# Patient Record
Sex: FEMALE | Race: BLACK OR AFRICAN AMERICAN | NOT HISPANIC OR LATINO
[De-identification: names, ages, dates, MRNs, and addresses within clinical notes are randomized per-mention and may not be internally consistent; named-entity substitution may affect disease eponyms.]

---

## 2019-11-21 ENCOUNTER — APPOINTMENT (OUTPATIENT)
Dept: NEUROLOGY | Facility: CLINIC | Age: 64
End: 2019-11-21

## 2020-01-07 ENCOUNTER — APPOINTMENT (OUTPATIENT)
Dept: NEUROLOGY | Facility: CLINIC | Age: 65
End: 2020-01-07
Payer: COMMERCIAL

## 2020-01-07 VITALS
HEIGHT: 64 IN | BODY MASS INDEX: 35.85 KG/M2 | SYSTOLIC BLOOD PRESSURE: 118 MMHG | WEIGHT: 210 LBS | DIASTOLIC BLOOD PRESSURE: 70 MMHG

## 2020-01-07 DIAGNOSIS — G31.84 MILD COGNITIVE IMPAIRMENT, SO STATED: ICD-10-CM

## 2020-01-07 DIAGNOSIS — R27.0 ATAXIA, UNSPECIFIED: ICD-10-CM

## 2020-01-07 PROCEDURE — 99204 OFFICE O/P NEW MOD 45 MIN: CPT

## 2020-01-07 RX ORDER — QUETIAPINE FUMARATE 400 MG/1
400 TABLET ORAL
Qty: 30 | Refills: 0 | Status: ACTIVE | COMMUNITY
Start: 2019-05-06

## 2020-01-07 RX ORDER — LOSARTAN POTASSIUM 100 MG/1
100 TABLET, FILM COATED ORAL
Qty: 30 | Refills: 0 | Status: ACTIVE | COMMUNITY
Start: 2019-12-17

## 2020-02-18 ENCOUNTER — APPOINTMENT (OUTPATIENT)
Dept: NEUROLOGY | Facility: CLINIC | Age: 65
End: 2020-02-18

## 2020-03-02 ENCOUNTER — APPOINTMENT (OUTPATIENT)
Dept: NEUROLOGY | Facility: CLINIC | Age: 65
End: 2020-03-02

## 2020-11-04 ENCOUNTER — EMERGENCY (EMERGENCY)
Facility: HOSPITAL | Age: 65
LOS: 1 days | Discharge: HOME | End: 2020-11-04
Attending: EMERGENCY MEDICINE
Payer: COMMERCIAL

## 2020-11-04 VITALS
TEMPERATURE: 98 F | DIASTOLIC BLOOD PRESSURE: 99 MMHG | OXYGEN SATURATION: 97 % | HEART RATE: 73 BPM | RESPIRATION RATE: 20 BRPM | SYSTOLIC BLOOD PRESSURE: 145 MMHG

## 2020-11-04 VITALS
HEART RATE: 79 BPM | TEMPERATURE: 98 F | RESPIRATION RATE: 20 BRPM | WEIGHT: 240.08 LBS | SYSTOLIC BLOOD PRESSURE: 177 MMHG | OXYGEN SATURATION: 98 % | DIASTOLIC BLOOD PRESSURE: 100 MMHG | HEIGHT: 64 IN

## 2020-11-04 DIAGNOSIS — Z98.89 OTHER SPECIFIED POSTPROCEDURAL STATES: Chronic | ICD-10-CM

## 2020-11-04 LAB
ALBUMIN SERPL ELPH-MCNC: 3.5 G/DL — SIGNIFICANT CHANGE UP (ref 3.3–5.2)
ALP SERPL-CCNC: 124 U/L — HIGH (ref 40–120)
ALT FLD-CCNC: 72 U/L — HIGH
ANION GAP SERPL CALC-SCNC: 11 MMOL/L — SIGNIFICANT CHANGE UP (ref 5–17)
AST SERPL-CCNC: 48 U/L — HIGH
BASOPHILS # BLD AUTO: 0.03 K/UL — SIGNIFICANT CHANGE UP (ref 0–0.2)
BASOPHILS NFR BLD AUTO: 0.4 % — SIGNIFICANT CHANGE UP (ref 0–2)
BILIRUB SERPL-MCNC: 0.5 MG/DL — SIGNIFICANT CHANGE UP (ref 0.4–2)
BUN SERPL-MCNC: 10 MG/DL — SIGNIFICANT CHANGE UP (ref 8–20)
CALCIUM SERPL-MCNC: 8.8 MG/DL — SIGNIFICANT CHANGE UP (ref 8.6–10.2)
CHLORIDE SERPL-SCNC: 104 MMOL/L — SIGNIFICANT CHANGE UP (ref 98–107)
CO2 SERPL-SCNC: 24 MMOL/L — SIGNIFICANT CHANGE UP (ref 22–29)
CREAT SERPL-MCNC: 0.71 MG/DL — SIGNIFICANT CHANGE UP (ref 0.5–1.3)
EOSINOPHIL # BLD AUTO: 0.22 K/UL — SIGNIFICANT CHANGE UP (ref 0–0.5)
EOSINOPHIL NFR BLD AUTO: 3 % — SIGNIFICANT CHANGE UP (ref 0–6)
GLUCOSE SERPL-MCNC: 229 MG/DL — HIGH (ref 70–99)
HCT VFR BLD CALC: 36.4 % — SIGNIFICANT CHANGE UP (ref 34.5–45)
HGB BLD-MCNC: 11.6 G/DL — SIGNIFICANT CHANGE UP (ref 11.5–15.5)
IMM GRANULOCYTES NFR BLD AUTO: 0.3 % — SIGNIFICANT CHANGE UP (ref 0–1.5)
LYMPHOCYTES # BLD AUTO: 1.26 K/UL — SIGNIFICANT CHANGE UP (ref 1–3.3)
LYMPHOCYTES # BLD AUTO: 17.5 % — SIGNIFICANT CHANGE UP (ref 13–44)
MCHC RBC-ENTMCNC: 25.5 PG — LOW (ref 27–34)
MCHC RBC-ENTMCNC: 31.9 GM/DL — LOW (ref 32–36)
MCV RBC AUTO: 80 FL — SIGNIFICANT CHANGE UP (ref 80–100)
MONOCYTES # BLD AUTO: 0.73 K/UL — SIGNIFICANT CHANGE UP (ref 0–0.9)
MONOCYTES NFR BLD AUTO: 10.1 % — SIGNIFICANT CHANGE UP (ref 2–14)
NEUTROPHILS # BLD AUTO: 4.96 K/UL — SIGNIFICANT CHANGE UP (ref 1.8–7.4)
NEUTROPHILS NFR BLD AUTO: 68.7 % — SIGNIFICANT CHANGE UP (ref 43–77)
NT-PROBNP SERPL-SCNC: 2137 PG/ML — HIGH (ref 0–300)
PLATELET # BLD AUTO: 250 K/UL — SIGNIFICANT CHANGE UP (ref 150–400)
POTASSIUM SERPL-MCNC: 4.4 MMOL/L — SIGNIFICANT CHANGE UP (ref 3.5–5.3)
POTASSIUM SERPL-SCNC: 4.4 MMOL/L — SIGNIFICANT CHANGE UP (ref 3.5–5.3)
PROT SERPL-MCNC: 6.1 G/DL — LOW (ref 6.6–8.7)
RBC # BLD: 4.55 M/UL — SIGNIFICANT CHANGE UP (ref 3.8–5.2)
RBC # FLD: 14.8 % — HIGH (ref 10.3–14.5)
SARS-COV-2 RNA SPEC QL NAA+PROBE: SIGNIFICANT CHANGE UP
SODIUM SERPL-SCNC: 139 MMOL/L — SIGNIFICANT CHANGE UP (ref 135–145)
WBC # BLD: 7.22 K/UL — SIGNIFICANT CHANGE UP (ref 3.8–10.5)
WBC # FLD AUTO: 7.22 K/UL — SIGNIFICANT CHANGE UP (ref 3.8–10.5)

## 2020-11-04 PROCEDURE — 99285 EMERGENCY DEPT VISIT HI MDM: CPT | Mod: 25

## 2020-11-04 PROCEDURE — 85025 COMPLETE CBC W/AUTO DIFF WBC: CPT

## 2020-11-04 PROCEDURE — 96374 THER/PROPH/DIAG INJ IV PUSH: CPT

## 2020-11-04 PROCEDURE — 99285 EMERGENCY DEPT VISIT HI MDM: CPT

## 2020-11-04 PROCEDURE — 83880 ASSAY OF NATRIURETIC PEPTIDE: CPT

## 2020-11-04 PROCEDURE — U0003: CPT

## 2020-11-04 PROCEDURE — 94640 AIRWAY INHALATION TREATMENT: CPT

## 2020-11-04 PROCEDURE — 71046 X-RAY EXAM CHEST 2 VIEWS: CPT

## 2020-11-04 PROCEDURE — 36415 COLL VENOUS BLD VENIPUNCTURE: CPT

## 2020-11-04 PROCEDURE — 71046 X-RAY EXAM CHEST 2 VIEWS: CPT | Mod: 26

## 2020-11-04 PROCEDURE — 80053 COMPREHEN METABOLIC PANEL: CPT

## 2020-11-04 RX ORDER — METFORMIN HYDROCHLORIDE 850 MG/1
1 TABLET ORAL
Qty: 60 | Refills: 0
Start: 2020-11-04 | End: 2020-12-03

## 2020-11-04 RX ORDER — IPRATROPIUM/ALBUTEROL SULFATE 18-103MCG
3 AEROSOL WITH ADAPTER (GRAM) INHALATION
Refills: 0 | Status: COMPLETED | OUTPATIENT
Start: 2020-11-04 | End: 2020-11-04

## 2020-11-04 RX ORDER — ALBUTEROL 90 UG/1
2 AEROSOL, METERED ORAL
Qty: 1 | Refills: 0
Start: 2020-11-04 | End: 2020-11-10

## 2020-11-04 RX ADMIN — Medication 125 MILLIGRAM(S): at 11:06

## 2020-11-04 RX ADMIN — Medication 3 MILLILITER(S): at 11:06

## 2020-11-04 RX ADMIN — Medication 3 MILLILITER(S): at 11:48

## 2020-11-04 RX ADMIN — Medication 3 MILLILITER(S): at 11:49

## 2020-11-04 NOTE — ED ADULT NURSE NOTE - PMH
Anxiety    Depression    Endometrial hyperplasia    HTN (hypertension)    Hyperlipidemia    Insomnia    Morbid obesity with BMI of 40.0-44.9, adult    BEATRICE (obstructive sleep apnea)    Osteoarthritis    Type 2 diabetes mellitus

## 2020-11-04 NOTE — ED PROVIDER NOTE - PATIENT PORTAL LINK FT
You can access the FollowMyHealth Patient Portal offered by [facility] by registering at the following website: [email or web address]. By joining [employer]’s FollowMyHealth portal, you will also be able to view your health information using other applications (apps) compatible with our system.

## 2020-11-04 NOTE — ED PROVIDER NOTE - OBJECTIVE STATEMENT
64 yo F phmx DM2, HTN, HLD, and current 51yr pack year smoker presents with shortness of breathe x 1 month worsening symptom after smoking. Pt states that she has been sleeping on 2 pillows for the past couple of years. Pt denies any alleviating and exacerbating factors at this time. Pt denies fever, chills, chest pain , or any other present  acute symptoms.

## 2020-11-04 NOTE — ED PROVIDER NOTE - CLINICAL SUMMARY MEDICAL DECISION MAKING FREE TEXT BOX
64yo F presents with shortness of breathe x 1 month. Will follow up with CBC, CMP, chest xray, 12 lead ECG, UA, BNP, albuterol, and methylprednisolone. Will continue to reassess.

## 2020-11-04 NOTE — ED ADULT NURSE REASSESSMENT NOTE - NS ED NURSE REASSESS COMMENT FT1
pt states "I feel much better" after nebs and steroids. Pt in NAD at this time. Safety maintained. Will continue to monitor.

## 2020-11-04 NOTE — ED ADULT TRIAGE NOTE - CHIEF COMPLAINT QUOTE
Pt arrives to ED with son  c/o SOB and difficulty breathing audible wheezing noted , unable to provide any meaningful information

## 2020-11-04 NOTE — ED ADULT NURSE NOTE - OBJECTIVE STATEMENT
assumed pt care at 1040. Pt A&OX 4 states "Ghazala been wheezing and having trouble breathing for weeks." Pt denies any known respiratory or cardiac health problems. Pt currently denies any chest pain, N/V/D, HA, dizziness, blurred vision, numbness/tingling,  symptoms. Pt tachypneic at first but calming techniques provided and patient relaxed. Wheezing noted B/L. Pt on room air, O2 sat WNL. Son at bedside. Pt denies any known covid contacts. Safety maintained. WIll continue to monitor.

## 2020-11-04 NOTE — ED PROVIDER NOTE - CARDIAC, MLM
Ms. Angella Baez states when she saw Dr Thomson she increased her Spironolactone 25 mg to 1.5 tablets daily.  She states that her B/P was not under good control while taking the 1.5 tablets.  She states she understood she could increase it to 2 tablets daily and see how that does.  She states the Spironolactone 25 mg taking 2 tablets daily is keeping her B/P under better control.  She has been having it checked a [facility] Jones's office.     New Script for Spironolactone 25 mg Take 2 tablets daily has been sent to [facility].           
Normal rate, regular rhythm.  Heart sounds S1, S2.  No murmurs, rubs or gallops.

## 2020-12-12 ENCOUNTER — INPATIENT (INPATIENT)
Facility: HOSPITAL | Age: 65
LOS: 6 days | Discharge: HOME | DRG: 247 | End: 2020-12-19
Attending: INTERNAL MEDICINE | Admitting: HOSPITALIST
Payer: COMMERCIAL

## 2020-12-12 VITALS
WEIGHT: 214.95 LBS | TEMPERATURE: 98 F | RESPIRATION RATE: 22 BRPM | SYSTOLIC BLOOD PRESSURE: 148 MMHG | HEART RATE: 86 BPM | OXYGEN SATURATION: 95 % | HEIGHT: 64 IN | DIASTOLIC BLOOD PRESSURE: 82 MMHG

## 2020-12-12 DIAGNOSIS — I50.9 HEART FAILURE, UNSPECIFIED: ICD-10-CM

## 2020-12-12 DIAGNOSIS — Z98.89 OTHER SPECIFIED POSTPROCEDURAL STATES: Chronic | ICD-10-CM

## 2020-12-12 LAB
ALBUMIN SERPL ELPH-MCNC: 3.8 G/DL — SIGNIFICANT CHANGE UP (ref 3.3–5.2)
ALP SERPL-CCNC: 140 U/L — HIGH (ref 40–120)
ALT FLD-CCNC: 44 U/L — HIGH
ANION GAP SERPL CALC-SCNC: 15 MMOL/L — SIGNIFICANT CHANGE UP (ref 5–17)
APPEARANCE UR: CLEAR — SIGNIFICANT CHANGE UP
AST SERPL-CCNC: 33 U/L — HIGH
BASOPHILS # BLD AUTO: 0.05 K/UL — SIGNIFICANT CHANGE UP (ref 0–0.2)
BASOPHILS NFR BLD AUTO: 0.7 % — SIGNIFICANT CHANGE UP (ref 0–2)
BILIRUB SERPL-MCNC: 0.9 MG/DL — SIGNIFICANT CHANGE UP (ref 0.4–2)
BILIRUB UR-MCNC: NEGATIVE — SIGNIFICANT CHANGE UP
BUN SERPL-MCNC: 11 MG/DL — SIGNIFICANT CHANGE UP (ref 8–20)
CALCIUM SERPL-MCNC: 9.5 MG/DL — SIGNIFICANT CHANGE UP (ref 8.6–10.2)
CHLORIDE SERPL-SCNC: 104 MMOL/L — SIGNIFICANT CHANGE UP (ref 98–107)
CO2 SERPL-SCNC: 22 MMOL/L — SIGNIFICANT CHANGE UP (ref 22–29)
COLOR SPEC: YELLOW — SIGNIFICANT CHANGE UP
CREAT SERPL-MCNC: 0.72 MG/DL — SIGNIFICANT CHANGE UP (ref 0.5–1.3)
CRP SERPL-MCNC: 1.09 MG/DL — HIGH (ref 0–0.4)
DIFF PNL FLD: NEGATIVE — SIGNIFICANT CHANGE UP
EOSINOPHIL # BLD AUTO: 0.11 K/UL — SIGNIFICANT CHANGE UP (ref 0–0.5)
EOSINOPHIL NFR BLD AUTO: 1.4 % — SIGNIFICANT CHANGE UP (ref 0–6)
EPI CELLS # UR: SIGNIFICANT CHANGE UP
FERRITIN SERPL-MCNC: 62 NG/ML — SIGNIFICANT CHANGE UP (ref 15–150)
GLUCOSE BLDC GLUCOMTR-MCNC: 183 MG/DL — HIGH (ref 70–99)
GLUCOSE SERPL-MCNC: 177 MG/DL — HIGH (ref 70–99)
GLUCOSE UR QL: NEGATIVE MG/DL — SIGNIFICANT CHANGE UP
HCT VFR BLD CALC: 40.2 % — SIGNIFICANT CHANGE UP (ref 34.5–45)
HGB BLD-MCNC: 12.5 G/DL — SIGNIFICANT CHANGE UP (ref 11.5–15.5)
IMM GRANULOCYTES NFR BLD AUTO: 0.4 % — SIGNIFICANT CHANGE UP (ref 0–1.5)
KETONES UR-MCNC: ABNORMAL
LDH SERPL L TO P-CCNC: 325 U/L — HIGH (ref 98–192)
LEUKOCYTE ESTERASE UR-ACNC: NEGATIVE — SIGNIFICANT CHANGE UP
LYMPHOCYTES # BLD AUTO: 1.52 K/UL — SIGNIFICANT CHANGE UP (ref 1–3.3)
LYMPHOCYTES # BLD AUTO: 20 % — SIGNIFICANT CHANGE UP (ref 13–44)
MAGNESIUM SERPL-MCNC: 1.8 MG/DL — SIGNIFICANT CHANGE UP (ref 1.6–2.6)
MCHC RBC-ENTMCNC: 23.6 PG — LOW (ref 27–34)
MCHC RBC-ENTMCNC: 31.1 GM/DL — LOW (ref 32–36)
MCV RBC AUTO: 76 FL — LOW (ref 80–100)
MONOCYTES # BLD AUTO: 0.68 K/UL — SIGNIFICANT CHANGE UP (ref 0–0.9)
MONOCYTES NFR BLD AUTO: 9 % — SIGNIFICANT CHANGE UP (ref 2–14)
NEUTROPHILS # BLD AUTO: 5.2 K/UL — SIGNIFICANT CHANGE UP (ref 1.8–7.4)
NEUTROPHILS NFR BLD AUTO: 68.5 % — SIGNIFICANT CHANGE UP (ref 43–77)
NITRITE UR-MCNC: NEGATIVE — SIGNIFICANT CHANGE UP
NT-PROBNP SERPL-SCNC: 2549 PG/ML — HIGH (ref 0–300)
PH UR: 6 — SIGNIFICANT CHANGE UP (ref 5–8)
PLATELET # BLD AUTO: 241 K/UL — SIGNIFICANT CHANGE UP (ref 150–400)
POTASSIUM SERPL-MCNC: 3.6 MMOL/L — SIGNIFICANT CHANGE UP (ref 3.5–5.3)
POTASSIUM SERPL-SCNC: 3.6 MMOL/L — SIGNIFICANT CHANGE UP (ref 3.5–5.3)
PROCALCITONIN SERPL-MCNC: 0.02 NG/ML — SIGNIFICANT CHANGE UP (ref 0.02–0.1)
PROT SERPL-MCNC: 6.8 G/DL — SIGNIFICANT CHANGE UP (ref 6.6–8.7)
PROT UR-MCNC: 30 MG/DL
RBC # BLD: 5.29 M/UL — HIGH (ref 3.8–5.2)
RBC # FLD: 15.3 % — HIGH (ref 10.3–14.5)
RBC CASTS # UR COMP ASSIST: NEGATIVE /HPF — SIGNIFICANT CHANGE UP (ref 0–4)
SARS-COV-2 RNA SPEC QL NAA+PROBE: SIGNIFICANT CHANGE UP
SODIUM SERPL-SCNC: 141 MMOL/L — SIGNIFICANT CHANGE UP (ref 135–145)
SP GR SPEC: 1.01 — SIGNIFICANT CHANGE UP (ref 1.01–1.02)
TROPONIN T SERPL-MCNC: <0.01 NG/ML — SIGNIFICANT CHANGE UP (ref 0–0.06)
TROPONIN T SERPL-MCNC: <0.01 NG/ML — SIGNIFICANT CHANGE UP (ref 0–0.06)
UROBILINOGEN FLD QL: 1 MG/DL
WBC # BLD: 7.59 K/UL — SIGNIFICANT CHANGE UP (ref 3.8–10.5)
WBC # FLD AUTO: 7.59 K/UL — SIGNIFICANT CHANGE UP (ref 3.8–10.5)
WBC UR QL: SIGNIFICANT CHANGE UP

## 2020-12-12 PROCEDURE — 71045 X-RAY EXAM CHEST 1 VIEW: CPT | Mod: 26

## 2020-12-12 PROCEDURE — 99285 EMERGENCY DEPT VISIT HI MDM: CPT

## 2020-12-12 PROCEDURE — 93010 ELECTROCARDIOGRAM REPORT: CPT | Mod: 76

## 2020-12-12 PROCEDURE — 99223 1ST HOSP IP/OBS HIGH 75: CPT

## 2020-12-12 RX ORDER — ALBUTEROL 90 UG/1
1 AEROSOL, METERED ORAL EVERY 4 HOURS
Refills: 0 | Status: DISCONTINUED | OUTPATIENT
Start: 2020-12-12 | End: 2020-12-19

## 2020-12-12 RX ORDER — SODIUM CHLORIDE 9 MG/ML
1000 INJECTION, SOLUTION INTRAVENOUS
Refills: 0 | Status: DISCONTINUED | OUTPATIENT
Start: 2020-12-12 | End: 2020-12-19

## 2020-12-12 RX ORDER — FUROSEMIDE 40 MG
40 TABLET ORAL ONCE
Refills: 0 | Status: COMPLETED | OUTPATIENT
Start: 2020-12-12 | End: 2020-12-12

## 2020-12-12 RX ORDER — FUROSEMIDE 40 MG
40 TABLET ORAL DAILY
Refills: 0 | Status: DISCONTINUED | OUTPATIENT
Start: 2020-12-13 | End: 2020-12-15

## 2020-12-12 RX ORDER — NICOTINE POLACRILEX 2 MG
1 GUM BUCCAL DAILY
Refills: 0 | Status: DISCONTINUED | OUTPATIENT
Start: 2020-12-12 | End: 2020-12-19

## 2020-12-12 RX ORDER — ASPIRIN/CALCIUM CARB/MAGNESIUM 324 MG
325 TABLET ORAL ONCE
Refills: 0 | Status: COMPLETED | OUTPATIENT
Start: 2020-12-12 | End: 2020-12-12

## 2020-12-12 RX ORDER — INSULIN LISPRO 100/ML
VIAL (ML) SUBCUTANEOUS
Refills: 0 | Status: DISCONTINUED | OUTPATIENT
Start: 2020-12-12 | End: 2020-12-19

## 2020-12-12 RX ORDER — INSULIN LISPRO 100/ML
VIAL (ML) SUBCUTANEOUS AT BEDTIME
Refills: 0 | Status: DISCONTINUED | OUTPATIENT
Start: 2020-12-12 | End: 2020-12-19

## 2020-12-12 RX ORDER — NITROGLYCERIN 6.5 MG
1 CAPSULE, EXTENDED RELEASE ORAL ONCE
Refills: 0 | Status: COMPLETED | OUTPATIENT
Start: 2020-12-12 | End: 2020-12-12

## 2020-12-12 RX ORDER — INFLUENZA VIRUS VACCINE 15; 15; 15; 15 UG/.5ML; UG/.5ML; UG/.5ML; UG/.5ML
0.5 SUSPENSION INTRAMUSCULAR ONCE
Refills: 0 | Status: COMPLETED | OUTPATIENT
Start: 2020-12-12 | End: 2020-12-12

## 2020-12-12 RX ORDER — ACETAMINOPHEN 500 MG
650 TABLET ORAL EVERY 4 HOURS
Refills: 0 | Status: DISCONTINUED | OUTPATIENT
Start: 2020-12-12 | End: 2020-12-19

## 2020-12-12 RX ORDER — DEXTROSE 50 % IN WATER 50 %
25 SYRINGE (ML) INTRAVENOUS ONCE
Refills: 0 | Status: DISCONTINUED | OUTPATIENT
Start: 2020-12-12 | End: 2020-12-19

## 2020-12-12 RX ORDER — DEXTROSE 50 % IN WATER 50 %
12.5 SYRINGE (ML) INTRAVENOUS ONCE
Refills: 0 | Status: DISCONTINUED | OUTPATIENT
Start: 2020-12-12 | End: 2020-12-19

## 2020-12-12 RX ORDER — ENOXAPARIN SODIUM 100 MG/ML
40 INJECTION SUBCUTANEOUS DAILY
Refills: 0 | Status: DISCONTINUED | OUTPATIENT
Start: 2020-12-12 | End: 2020-12-19

## 2020-12-12 RX ORDER — GLUCAGON INJECTION, SOLUTION 0.5 MG/.1ML
1 INJECTION, SOLUTION SUBCUTANEOUS ONCE
Refills: 0 | Status: DISCONTINUED | OUTPATIENT
Start: 2020-12-12 | End: 2020-12-19

## 2020-12-12 RX ORDER — DEXTROSE 50 % IN WATER 50 %
15 SYRINGE (ML) INTRAVENOUS ONCE
Refills: 0 | Status: DISCONTINUED | OUTPATIENT
Start: 2020-12-12 | End: 2020-12-19

## 2020-12-12 RX ADMIN — Medication 325 MILLIGRAM(S): at 18:38

## 2020-12-12 RX ADMIN — Medication 40 MILLIGRAM(S): at 18:38

## 2020-12-12 RX ADMIN — Medication 1 PATCH: at 23:16

## 2020-12-12 RX ADMIN — Medication 1 INCH(S): at 18:00

## 2020-12-12 NOTE — ED PROVIDER NOTE - CLINICAL SUMMARY MEDICAL DECISION MAKING FREE TEXT BOX
The patient presents with progressively worsening of SOB, PEDERSON concerning for CHF and will admit for further evaluation

## 2020-12-12 NOTE — H&P ADULT - NSICDXPASTMEDICALHX_GEN_ALL_CORE_FT
PAST MEDICAL HISTORY:  Anxiety     Depression     Endometrial hyperplasia     HTN (hypertension)     Hyperlipidemia     Insomnia     Morbid obesity with BMI of 40.0-44.9, adult     BEATRICE (obstructive sleep apnea)     Osteoarthritis     Type 2 diabetes mellitus

## 2020-12-12 NOTE — ED ADULT NURSE NOTE - OBJECTIVE STATEMENT
Pt. brought in by son with complaints of difficulty breathing x 1 month.  Pt was seen here approx 1 month ago and was prescribed inhaler and nebulizer but ran out of medication.  Pt. was supposed to follow up with PMD after last visit but missed appointment.  Respirations even and unlabored in triage.  Pt. denies worsening of sob since last visit. No

## 2020-12-12 NOTE — H&P ADULT - ASSESSMENT
66 yo F with hx of DM who comes to the ER for shortness of breath. In  the ED she was noted to have an elevated BNP and a cxr which showed cardiomegaly and a slight increase in the right sided pleural effusion. She was given IV Lasix and [place] cardiology was consulted. She is being admitted to [facility] for further work up and evaluation.     Shortness of breath/PEDERSON likely due to acute CHF  cxr with cardiomegaly and right effusion elevated BNP  s/p lasix in ed   - daily weights and strict Is and Os  - Fluid restrction  - Lasix 40mg IV daily  - replete K >4 and Mg >2  - Cardiology called by ED  - monitor on tele  - COVID test pending    DM   on metformin at home  - ISS and hypoglycemic protocol  - check a1c   64 yo F with hx of DM who comes to the ER for shortness of breath. In  the ED she was noted to have an elevated BNP and a cxr which showed cardiomegaly and a slight increase in the right sided pleural effusion. She was given IV Lasix and [place] cardiology was consulted. She is being admitted to [facility] for further work up and evaluation.     Shortness of breath/PEDERSON likely due to acute CHF  cxr with cardiomegaly and right effusion elevated BNP  s/p lasix in ed   - daily weights and strict Is and Os  - Fluid restrction  - Lasix 40mg IV daily  - replete K >4 and Mg >2  - Cardiology called by ED  - monitor on tele and   - COVID test pending    DM   on metformin at home  - ISS and hypoglycemic protocol  - check a1c    Tobacco use  - nicotine patch     Obesity   BMI 36  - low fat diet    dvt ppx: Lovenox

## 2020-12-12 NOTE — ED ADULT TRIAGE NOTE - CHIEF COMPLAINT QUOTE
Pt. brought in by son with complaints of difficulty breathing x 1 month.  Pt was seen here approx 1 month ago and was prescribed inhaler and nebulizer but ran out of medication.  Pt. was supposed to follow up with PMD after last visit but missed appointment.  Respirations even and unlabored in triage.  Pt. denies worsening of sob since last visit.

## 2020-12-12 NOTE — H&P ADULT - HISTORY OF PRESENT ILLNESS
Ms Neri is a 66 yo F with hx of DM who comes to the ER for shortness of breath. She states it started about 1 month ago. She went to see her PCP and she was sent to the ER, this was back in Nov. From review of chart it seems like patient was sent home with albuterol and steroid from the ED. She reports symtoms persisted. Breathing is worse with minimal exertion. She reports a cough productive of clear sputum. Denies leg swelling and fever. She notes she sleeps with at least 2 pillows propped up. She is current everyday smoker and has been smoking for many years. She denies any cardiac medical issues,  In the ED she was noted to have an elevated BNP and a cxr which showed cardiomegaly and a slight increase in the rihgt sided pleural effusion. She was given IV lasix and [place] cardiology was consulted. She is being admitted to [facility] for further work up and evaluation. Ms Neri is a 66 yo F with hx of DM who comes to the ER for shortness of breath. She states it started about 1 month ago. She went to see her PCP and she was sent to the ER, this was back in Nov. From review of chart it seems like patient was sent home with albuterol and steroid from the ED. She reports symtoms persisted. Breathing is worse with minimal exertion. She reports a cough productive of clear sputum. Denies leg swelling and fever. She notes she sleeps with at least 2 pillows propped up. She is current everyday smoker and has been smoking for many years. She denies any cardiac medical issues,  In the ED she was noted to have an elevated BNP and a cxr which showed cardiomegaly and a slight increase in the rihgt sided pleural effusion. She was given IV lasix and [place] cardiology was consulted. She is being admitted to [facility] for further work up and evaluation. Her Covid test is pending.

## 2020-12-12 NOTE — ED PROVIDER NOTE - OBJECTIVE STATEMENT
The patient is a 65 year female presents with SOB, dyspnea, orthopnea PEDERSON worsening over few month.  No CP, No abd pain, No fever, +Cough

## 2020-12-12 NOTE — ED STATDOCS - OBJECTIVE STATEMENT
65y female pt with pmhx of anxiety, depression, endometrial hyperplasia, HTN, hyperlipidemia, morbid obesity, diabetes, osteoarthritis

## 2020-12-12 NOTE — ED PROVIDER NOTE - CARE PLAN
Principal Discharge DX:	CHF (congestive heart failure)  Secondary Diagnosis:	HTN (hypertension)  Secondary Diagnosis:	Hyperlipidemia  Secondary Diagnosis:	Type 2 diabetes mellitus

## 2020-12-13 LAB
A1C WITH ESTIMATED AVERAGE GLUCOSE RESULT: 7.5 % — HIGH (ref 4–5.6)
ALBUMIN SERPL ELPH-MCNC: 3.2 G/DL — LOW (ref 3.3–5.2)
ALP SERPL-CCNC: 110 U/L — SIGNIFICANT CHANGE UP (ref 40–120)
ALT FLD-CCNC: 34 U/L — HIGH
ANION GAP SERPL CALC-SCNC: 8 MMOL/L — SIGNIFICANT CHANGE UP (ref 5–17)
AST SERPL-CCNC: 32 U/L — HIGH
BILIRUB SERPL-MCNC: 0.7 MG/DL — SIGNIFICANT CHANGE UP (ref 0.4–2)
BUN SERPL-MCNC: 12 MG/DL — SIGNIFICANT CHANGE UP (ref 8–20)
CALCIUM SERPL-MCNC: 8.7 MG/DL — SIGNIFICANT CHANGE UP (ref 8.6–10.2)
CHLORIDE SERPL-SCNC: 105 MMOL/L — SIGNIFICANT CHANGE UP (ref 98–107)
CO2 SERPL-SCNC: 26 MMOL/L — SIGNIFICANT CHANGE UP (ref 22–29)
CREAT SERPL-MCNC: 0.74 MG/DL — SIGNIFICANT CHANGE UP (ref 0.5–1.3)
ESTIMATED AVERAGE GLUCOSE: 169 MG/DL — HIGH (ref 68–114)
GLUCOSE BLDC GLUCOMTR-MCNC: 136 MG/DL — HIGH (ref 70–99)
GLUCOSE BLDC GLUCOMTR-MCNC: 155 MG/DL — HIGH (ref 70–99)
GLUCOSE BLDC GLUCOMTR-MCNC: 177 MG/DL — HIGH (ref 70–99)
GLUCOSE BLDC GLUCOMTR-MCNC: 186 MG/DL — HIGH (ref 70–99)
GLUCOSE SERPL-MCNC: 166 MG/DL — HIGH (ref 70–99)
HCT VFR BLD CALC: 34.5 % — SIGNIFICANT CHANGE UP (ref 34.5–45)
HCV AB S/CO SERPL IA: 0.08 S/CO — SIGNIFICANT CHANGE UP (ref 0–0.99)
HCV AB SERPL-IMP: SIGNIFICANT CHANGE UP
HGB BLD-MCNC: 10.7 G/DL — LOW (ref 11.5–15.5)
MCHC RBC-ENTMCNC: 23.4 PG — LOW (ref 27–34)
MCHC RBC-ENTMCNC: 31 GM/DL — LOW (ref 32–36)
MCV RBC AUTO: 75.3 FL — LOW (ref 80–100)
PLATELET # BLD AUTO: 224 K/UL — SIGNIFICANT CHANGE UP (ref 150–400)
POTASSIUM SERPL-MCNC: 3.6 MMOL/L — SIGNIFICANT CHANGE UP (ref 3.5–5.3)
POTASSIUM SERPL-SCNC: 3.6 MMOL/L — SIGNIFICANT CHANGE UP (ref 3.5–5.3)
PROT SERPL-MCNC: 5.8 G/DL — LOW (ref 6.6–8.7)
RBC # BLD: 4.58 M/UL — SIGNIFICANT CHANGE UP (ref 3.8–5.2)
RBC # FLD: 15.4 % — HIGH (ref 10.3–14.5)
SARS-COV-2 IGG SERPL QL IA: NEGATIVE — SIGNIFICANT CHANGE UP
SARS-COV-2 IGM SERPL IA-ACNC: <0.1 INDEX — SIGNIFICANT CHANGE UP
SODIUM SERPL-SCNC: 139 MMOL/L — SIGNIFICANT CHANGE UP (ref 135–145)
WBC # BLD: 8.51 K/UL — SIGNIFICANT CHANGE UP (ref 3.8–10.5)
WBC # FLD AUTO: 8.51 K/UL — SIGNIFICANT CHANGE UP (ref 3.8–10.5)

## 2020-12-13 PROCEDURE — 99233 SBSQ HOSP IP/OBS HIGH 50: CPT

## 2020-12-13 PROCEDURE — 99223 1ST HOSP IP/OBS HIGH 75: CPT

## 2020-12-13 RX ORDER — ALBUTEROL 90 UG/1
2.5 AEROSOL, METERED ORAL ONCE
Refills: 0 | Status: COMPLETED | OUTPATIENT
Start: 2020-12-13 | End: 2020-12-13

## 2020-12-13 RX ADMIN — Medication 2: at 06:51

## 2020-12-13 RX ADMIN — Medication 40 MILLIGRAM(S): at 05:02

## 2020-12-13 RX ADMIN — ENOXAPARIN SODIUM 40 MILLIGRAM(S): 100 INJECTION SUBCUTANEOUS at 10:21

## 2020-12-13 RX ADMIN — Medication 2: at 10:20

## 2020-12-13 RX ADMIN — ALBUTEROL 2.5 MILLIGRAM(S): 90 AEROSOL, METERED ORAL at 01:41

## 2020-12-13 RX ADMIN — Medication 1 INCH(S): at 05:05

## 2020-12-13 RX ADMIN — Medication 1 PATCH: at 20:00

## 2020-12-13 RX ADMIN — Medication 1 PATCH: at 11:59

## 2020-12-13 RX ADMIN — Medication 1 PATCH: at 10:21

## 2020-12-13 NOTE — PROGRESS NOTE ADULT - SUBJECTIVE AND OBJECTIVE BOX
CC: Shortness of breath/PEDERSON likely due to acute CHF  INTERVAL HPI/OVERNIGHT EVENTS: Pt seen and examined at bedside this AM by Hospitalist and PA. Pt states did not sleep well last night due to noise level since Pt is falling asleep during exam at first. Pt states that she is feeling better. Has ambulated OOB and denies shortness of breath on exertion. Admits to cough with occasional light/clear sputum. Denies fever, chills, headaches, dizziness, chest pain/pressure, palpitations, difficulty breathing now, abdominal pain, n/v/d or any other complaints.    On tele: HR 82-93, new PVCs, O2 95% on room air     REVIEW OF SYSTEMS:  CONSTITUTIONAL: No fever, weight loss, or fatigue  RESPIRATORY: see HPI  CARDIOVASCULAR: No chest pain, palpitations, dizziness, or leg swelling  GASTROINTESTINAL: No abdominal or epigastric pain. No nausea, vomiting or hematemesis; No diarrhea or constipation  NEUROLOGICAL: No headaches, memory loss, loss of strength, numbness, or tremors  SKIN: No itching, burning, rashes, or lesions   MUSCULOSKELETAL: No joint pain or swelling; No muscle, back, or extremity pain    Allergies  No Known Allergies    PAST MEDICAL & SURGICAL HISTORY:  Endometrial hyperplasia  Osteoarthritis  EBATRICE (obstructive sleep apnea)  Morbid obesity with BMI of 40.0-44.9, adult  Insomnia  Anxiety  Depression  Type 2 diabetes mellitus  Hyperlipidemia  HTN (hypertension)  S/P shoulder surgery  right 1985  S/P  section  x 1    VITAL SIGNS:  T(C): 36.3 (20 @ 11:28), Max: 36.7 (20 @ 12:46)  HR: 87 (20 @ 11:28) (77 - 91)  BP: 130/67 (20 @ 11:28) (109/60 - 151/86)  RR: 20 (20 @ 11:28) (18 - 22)  SpO2: 95% (20 @ 11:28) (95% - 97%)  Wt(kg): --Vital Signs Last 24 Hrs  T(C): 36.3 (13 Dec 2020 11:28), Max: 36.7 (12 Dec 2020 12:46)  T(F): 97.4 (13 Dec 2020 11:28), Max: 98.1 (13 Dec 2020 04:45)  HR: 87 (13 Dec 2020 11:28) (77 - 91)  BP: 130/67 (13 Dec 2020 11:28) (109/60 - 151/86)  BP(mean): --  RR: 20 (13 Dec 2020 11:28) (18 - 22)  SpO2: 95% (13 Dec 2020 11:28) (95% - 97%)    PHYSICAL EXAM:  GENERAL: Pt sleeping in bed comfortably in NAD, wakes to voice easily   HEAD:  Atraumatic  EYES: EOMI, PERRL, conjunctiva and sclera clear  ENT: Moist mucous membranes  NECK: No JVD  CHEST/LUNG: Diminished BS B/L at bases, scant right LL crackles. No rales, rhonchi, wheezing, or rubs. Unlabored respirations  HEART: S1, S2, Regular rate and rhythm   ABDOMEN: Bowel sounds present; Soft, Nontender, Nondistended. No guarding or rigidity   EXTREMITIES:  2+ Peripheral Pulses, brisk capillary refill. No clubbing or cyanosis. trace pitting edema B/L LE   NERVOUS SYSTEM:  Alert & Oriented X3, speech clear, FROM x 4 extremities. No deficits   SKIN: No rashes or lesions    LABS:                      10.7   8.51  )-----------( 224      ( 13 Dec 2020 05:30 )             34.5     12-13  139  |  105  |  12.0  ----------------------------<  166<H>  3.6   |  26.0  |  0.74    Ca    8.7      13 Dec 2020 05:30  Mg     1.8     12-12    TPro  5.8<L>  /  Alb  3.2<L>  /  TBili  0.7  /  DBili  x   /  AST  32<H>  /  ALT  34<H>  /  AlkPhos  110  12-13    CAPILLARY BLOOD GLUCOSE  POCT Blood Glucose.: 155 mg/dL (13 Dec 2020 10:20)  POCT Blood Glucose.: 186 mg/dL (13 Dec 2020 06:49)  POCT Blood Glucose.: 183 mg/dL (12 Dec 2020 21:27)    MEDICATIONS  (STANDING):  dextrose 40% Gel 15 Gram(s) Oral once  dextrose 5%. 1000 milliLiter(s) (50 mL/Hr) IV Continuous <Continuous>  dextrose 5%. 1000 milliLiter(s) (100 mL/Hr) IV Continuous <Continuous>  dextrose 50% Injectable 25 Gram(s) IV Push once  dextrose 50% Injectable 12.5 Gram(s) IV Push once  dextrose 50% Injectable 25 Gram(s) IV Push once  enoxaparin Injectable 40 milliGRAM(s) SubCutaneous daily  furosemide   Injectable 40 milliGRAM(s) IV Push daily  glucagon  Injectable 1 milliGRAM(s) IntraMuscular once  influenza   Vaccine 0.5 milliLiter(s) IntraMuscular once  insulin lispro (ADMELOG) corrective regimen sliding scale   SubCutaneous three times a day before meals  insulin lispro (ADMELOG) corrective regimen sliding scale   SubCutaneous at bedtime  nicotine -   7 mG/24Hr(s) Patch 1 patch Transdermal daily    MEDICATIONS  (PRN):  acetaminophen   Tablet .. 650 milliGRAM(s) Oral every 4 hours PRN Mild Pain (1 - 3)  ALBUTerol    90 MICROgram(s) HFA Inhaler 1 Puff(s) Inhalation every 4 hours PRN Shortness of Breath and/or Wheezing   CC: Shortness of breath/PEDERSON likely due to acute CHF  INTERVAL HPI/OVERNIGHT EVENTS: Pt seen and examined at bedside this AM by Hospitalist and PA. Pt states did not sleep well last night due to noise level since Pt is falling asleep during exam at first. Pt states that she is feeling better. Has ambulated OOB and denies shortness of breath on exertion. Admits to cough with occasional light/clear sputum. Denies fever, chills, headaches, dizziness, chest pain/pressure, palpitations, difficulty breathing now, abdominal pain, n/v/d or any other complaints.    On tele: HR 82-93, few PVCs, O2 95% on room air     REVIEW OF SYSTEMS:  CONSTITUTIONAL: No fever, weight loss, or fatigue  RESPIRATORY: see HPI  CARDIOVASCULAR: No chest pain, palpitations, dizziness, or leg swelling  GASTROINTESTINAL: No abdominal or epigastric pain. No nausea, vomiting or hematemesis; No diarrhea or constipation  NEUROLOGICAL: No headaches, memory loss, loss of strength, numbness, or tremors  SKIN: No itching, burning, rashes, or lesions   MUSCULOSKELETAL: No joint pain or swelling; No muscle, back, or extremity pain    Allergies  No Known Allergies    PAST MEDICAL & SURGICAL HISTORY:  Endometrial hyperplasia  Osteoarthritis  BEATRICE (obstructive sleep apnea)  Morbid obesity with BMI of 40.0-44.9, adult  Insomnia  Anxiety  Depression  Type 2 diabetes mellitus  Hyperlipidemia  HTN (hypertension)  S/P shoulder surgery  right 1985  S/P  section  x 1    VITAL SIGNS:  T(C): 36.3 (20 @ 11:28), Max: 36.7 (20 @ 12:46)  HR: 87 (20 @ 11:28) (77 - 91)  BP: 130/67 (20 @ 11:28) (109/60 - 151/86)  RR: 20 (20 @ 11:28) (18 - 22)  SpO2: 95% (20 @ 11:28) (95% - 97%)  Wt(kg): --Vital Signs Last 24 Hrs  T(C): 36.3 (13 Dec 2020 11:28), Max: 36.7 (12 Dec 2020 12:46)  T(F): 97.4 (13 Dec 2020 11:28), Max: 98.1 (13 Dec 2020 04:45)  HR: 87 (13 Dec 2020 11:28) (77 - 91)  BP: 130/67 (13 Dec 2020 11:28) (109/60 - 151/86)  BP(mean): --  RR: 20 (13 Dec 2020 11:28) (18 - 22)  SpO2: 95% (13 Dec 2020 11:28) (95% - 97%)    PHYSICAL EXAM:  GENERAL: Pt sleeping in bed comfortably in NAD, wakes to voice easily   HEAD:  Atraumatic  EYES: EOMI, PERRL, conjunctiva and sclera clear  ENT: Moist mucous membranes  NECK: No JVD  CHEST/LUNG: Diminished BS B/L at bases, scant right LL crackles. No rales, rhonchi, wheezing, or rubs. Unlabored respirations  HEART: S1, S2, Regular rate and rhythm   ABDOMEN: Bowel sounds present; Soft, Nontender, Nondistended. No guarding or rigidity   EXTREMITIES:  2+ Peripheral Pulses, brisk capillary refill. No clubbing or cyanosis. trace pitting edema B/L LE   NERVOUS SYSTEM:  Alert & Oriented X3, speech clear, FROM x 4 extremities. No deficits   SKIN: No rashes or lesions    LABS:                      10.7   8.51  )-----------( 224      ( 13 Dec 2020 05:30 )             34.5     12-13  139  |  105  |  12.0  ----------------------------<  166<H>  3.6   |  26.0  |  0.74    Ca    8.7      13 Dec 2020 05:30  Mg     1.8     12-12    TPro  5.8<L>  /  Alb  3.2<L>  /  TBili  0.7  /  DBili  x   /  AST  32<H>  /  ALT  34<H>  /  AlkPhos  110  12-13    CAPILLARY BLOOD GLUCOSE  POCT Blood Glucose.: 155 mg/dL (13 Dec 2020 10:20)  POCT Blood Glucose.: 186 mg/dL (13 Dec 2020 06:49)  POCT Blood Glucose.: 183 mg/dL (12 Dec 2020 21:27)    MEDICATIONS  (STANDING):  dextrose 40% Gel 15 Gram(s) Oral once  dextrose 5%. 1000 milliLiter(s) (50 mL/Hr) IV Continuous <Continuous>  dextrose 5%. 1000 milliLiter(s) (100 mL/Hr) IV Continuous <Continuous>  dextrose 50% Injectable 25 Gram(s) IV Push once  dextrose 50% Injectable 12.5 Gram(s) IV Push once  dextrose 50% Injectable 25 Gram(s) IV Push once  enoxaparin Injectable 40 milliGRAM(s) SubCutaneous daily  furosemide   Injectable 40 milliGRAM(s) IV Push daily  glucagon  Injectable 1 milliGRAM(s) IntraMuscular once  influenza   Vaccine 0.5 milliLiter(s) IntraMuscular once  insulin lispro (ADMELOG) corrective regimen sliding scale   SubCutaneous three times a day before meals  insulin lispro (ADMELOG) corrective regimen sliding scale   SubCutaneous at bedtime  nicotine -   7 mG/24Hr(s) Patch 1 patch Transdermal daily    MEDICATIONS  (PRN):  acetaminophen   Tablet .. 650 milliGRAM(s) Oral every 4 hours PRN Mild Pain (1 - 3)  ALBUTerol    90 MICROgram(s) HFA Inhaler 1 Puff(s) Inhalation every 4 hours PRN Shortness of Breath and/or Wheezing

## 2020-12-13 NOTE — CONSULT NOTE ADULT - ATTENDING COMMENTS
pt seen and examined. Plan of care dw np.  EKG and imaging studies reviewed by me as well.  Pt with extensive smoking hx, current smoker, hx of COPD present with worsening SOB for the past month or two, with exertion, leg edema and weight gain.  No CP or palpitations. No syncope or presyncope.   PT is volume overloaded on exam. No s3/s4 gallop   Agree with diuretics   Plan to have TTE for further evaluation.  Cardiac cath in 2014 was reviewed as well, no obstructive CAD  I agree with a/p.

## 2020-12-13 NOTE — CONSULT NOTE ADULT - ASSESSMENT
full note to follow    TTE pending 66 yo F with hx of DM, poor historian, presents to ED with c/o SOB. Worsening over past month, sleeps with 2 pillows at night, propped up. Current smoker 0.5 PPD. Records show cath in 2014, EF 50%, normal Cors. Pt denies having any cardiac cath in past. CXR-cardiomegaly, pulm congestion; BNP elevated. TTE pending.     full note to follow    TTE pending 66 yo F with hx of DM, poor historian, presents to ED with c/o SOB. Worsening over past month, sleeps with 2 pillows at night, propped up. Current smoker 0.5 PPD. Records show cath in 2014, EF 50%, normal Cors. Pt denies having any cardiac cath in past. CXR-cardiomegaly, pulm congestion; BNP elevated. TTE pending.     Shortness of breath  TTE pending  cont lasix 40mg IV daily  Strict I and O  [facility] 2014- EF 50%, normal cors   start losartan 50mg PO daily  will cont to follow     DM   as per primary team  A1C 7.5    HTN  monitor as per routine  losartan 50mg daily

## 2020-12-13 NOTE — PROGRESS NOTE ADULT - ASSESSMENT
Pt is a 64 y/o F with hx of DM who comes to the ER for shortness of breath. In  the ED she was noted to have an elevated BNP and a cxr which showed cardiomegaly and a slight increase in the right sided pleural effusion. She was given IV Lasix and [place] cardiology was consulted. CXR with cardiomegaly and right effusion. s/p lasix in ED. COVID negative. She is being admitted for Shortness of breath/PEDERSON likely due to acute CHF.     Shortness of breath/PEDERSON likely due to acute CHF  cxr with cardiomegaly and right effusion elevated BNP  - daily weights and strict Is and Os  - Fluid restriction  - Lasix 40mg IV daily  - replete K >4 and Mg >2  - Cardiology consulted   - monitor on tele and   - BNP 2549, troponin neg x2  - TTE pending     DM   on metformin at home  - ISS and hypoglycemic protocol  - A1c 7.5    Tobacco use  - nicotine patch     Obesity   BMI 36  - counciled on weight loss   - low fat diet    dvt ppx: Lovenox    Dispo: PT ordered to assess for needs, likely home in 24-48 hours

## 2020-12-13 NOTE — CONSULT NOTE ADULT - SUBJECTIVE AND OBJECTIVE BOX
[place] [facility]                                                               Office:  [address] [address]                                                              Telephone: [phone]. Fax:[phone]                                                                        CARDIOLOGY CONSULTATION NOTE                                                                                             Consult requested by:  Daisha   Reason for Consultation: dyspnea   History obtained by: Patient and medical record   obtained: No  Cardiologist: none    Chief complaint:    Patient is a 65y old  Female who presents with a chief complaint of Shortness of breath (12 Dec 2020 18:02)        HPI:  Ms Neri is a 64 yo F with hx of DM who comes to the ER for shortness of breath. She states it started about 1 month ago. She went to see her PCP and she was sent to the ER, this was back in Nov. From review of chart it seems like patient was sent home with albuterol and steroid from the ED. She reports symtoms persisted. Breathing is worse with minimal exertion. She reports a cough productive of clear sputum. Denies leg swelling and fever. She notes she sleeps with at least 2 pillows propped up. She is current everyday smoker and has been smoking for many years. She denies any cardiac medical issues,  In the ED she was noted to have an elevated BNP and a cxr which showed cardiomegaly and a slight increase in the rihgt sided pleural effusion. She was given IV lasix and [place] [facility]ology was consulted. She is being admitted to [facility] for further work up and evaluation. Her Covid test is pending.  (12 Dec 2020 18:02)        REVIEW OF SYMPTOMS:     CONSTITUTIONAL: No fever, no weight loss, no fatigue, no weight gain   ENMT:  No difficulty hearing, tinnitus, vertigo; No sinus or throat pain  NECK: No pain or stiffness  CARDIOVASCULAR: No chest pain, no dyspnea, no syncope, no palpitations, no dizziness, no Orthopnea, no Paroxsymal nocturnal dyspnea  RESPIRATORY: No Dyspnea on exertion, no Shortness of breath, no cough, no wheezing  : No dysuria, no hematuria   GI: No dark color stool, no melena, no diarrhea, no constipation, no abdominal pain   NEURO: No headache, no dizziness, no slurred speech   MUSCULOSKELETAL: No joint pain or swelling; No muscle, back, or extremity pain  PSYCH: No agitation, no anxiety.    ALL OTHER REVIEW OF SYSTEMS ARE NEGATIVE.      PREVIOUS DIAGNOSTIC TESTING  ECHO FINDINGS:  pending    CATHETERIZATION FINDINGS:   < from: Cardiac Cath Lab - Adult (14 @ 17:05) >  VENTRICLES: Global left ventricular function was normal. EF estimated was  50 %.  VALVES: MITRAL VALVE: The mitral valve exhibited no regurgitation.  CORONARY VESSELS: The coronary circulation is right dominant.  LM:   --  LM: Angiography showed minor luminal irregularities with no flow  limiting lesions.  LAD:   --  LAD: Angiography showed minor luminal irregularities with no  flow limiting lesions.  CX:   --  Circumflex: Angiography showed minor luminal irregularities with  no flow limiting lesions.  RCA:   --  RCA: Angiography showed minor luminal irregularities with no  flow limiting lesions.  COMPLICATIONS: No complications occurred during the cath lab visit.  DIAGNOSTIC IMPRESSIONS: No significant coronary artery disease with  preserved LV function.  DIAGNOSTIC RECOMMENDATIONS: Continue current medical management.  Prepared and signed by  Andre Hays MD  Signed 2014 17:57:19    < end of copied text >        ALLERGIES: Allergies  No Known Allergies  Intolerances        PAST MEDICAL HISTORY  Endometrial hyperplasia  Osteoarthritis  BEATRICE (obstructive sleep apnea)  Morbid obesity with BMI of 40.0-44.9, adult  Insomnia  Anxiety  Depression  Type 2 diabetes mellitus  Hyperlipidemia  HTN (hypertension)      PAST SURGICAL HISTORY  S/P shoulder surgery  S/P  section      FAMILY HISTORY:  No pertinent family history in first degree relatives        SOCIAL HISTORY:  Denies smoking/alcohol/drugs  CIGARETTES: o.5 PPD     ALCOHOL: denies   DRUGS: denies       CURRENT MEDICATIONS:  furosemide   Injectable 40 milliGRAM(s) IV Push daily   dextrose 40% Gel  dextrose 5%.  dextrose 50% Injectable  enoxaparin Injectable  glucagon  Injectable  influenza   Vaccine  insulin lispro (ADMELOG) corrective regimen sliding scale  insulin lispro (ADMELOG) corrective regimen sliding scale  nicotine -   7 mG/24Hr(s) Patch        HOME MEDICATIONS:      Vital Signs Last 24 Hrs  T(C): 36.7 (13 Dec 2020 04:45), Max: 36.7 (12 Dec 2020 12:46)  T(F): 98.1 (13 Dec 2020 04:45), Max: 98.1 (13 Dec 2020 04:45)  HR: 79 (13 Dec 2020 04:45) (77 - 91)  BP: 134/67 (13 Dec 2020 04:45) (109/60 - 151/86)  RR: 22 (13 Dec 2020 04:45) (18 - 22)  SpO2: 97% (13 Dec 2020 04:45) (95% - 97%)      PHYSICAL EXAM:  Constitutional: Comfortable . No acute distress.   HEENT: Atraumatic and normocephalic , neck is supple . no JVD. No carotid bruit. PEERL   CNS: A&Ox3. No focal deficits. EOMI. Cranial nerves II-IX are intact.   Respiratory: CTAB, ulabored   Cardiovascular: S1S2 RRR. No murmur/rubs or gallop.  Gastrointestinal: Soft non-tender and non distended . +Bowel sounds. negative Saunders's sign.  Extremities: No edema.   Psychiatric: Calm . no agitation.  Skin: No skin rash/ulcers visualized to face, hands or feet.    Intake and output:    @ 07:01  -   @ 07:00  --------------------------------------------------------  IN: 0 mL / OUT: 500 mL / NET: -500 mL    LABS:                        10.7   8.51  )-----------( 224      ( 13 Dec 2020 05:30 )             34.5         139  |  105  |  12.0  ----------------------------<  166<H>  3.6   |  26.0  |  0.74    Ca    8.7      13 Dec 2020 05:30  Mg     1.8         TPro  5.8<L>  /  Alb  3.2<L>  /  TBili  0.7  /  DBili  x   /  AST  32<H>  /  ALT  34<H>  /  AlkPhos  110      CARDIAC MARKERS ( 12 Dec 2020 21:44 )  x     / <0.01 ng/mL / x     / x     / x      CARDIAC MARKERS ( 12 Dec 2020 15:33 )  x     / <0.01 ng/mL / x     / x     / x        ;p-BNP=Serum Pro-Brain Natriuretic Peptide: 2549 pg/mL ( @ 15:33)      Urinalysis Basic - ( 12 Dec 2020 16:47 )    Color: Yellow / Appearance: Clear / S.015 / pH: x  Gluc: x / Ketone: Trace  / Bili: Negative / Urobili: 1 mg/dL   Blood: x / Protein: 30 mg/dL / Nitrite: Negative   Leuk Esterase: Negative / RBC: Negative /HPF / WBC 0-2   Sq Epi: x / Non Sq Epi: Few / Bacteria: x      INTERPRETATION OF TELEMETRY: SR  ECG: Reviewed by me.     < from: Xray Chest 1 View-PORTABLE IMMEDIATE (20 @ 14:25) >  IMPRESSION: Small right effusion increased from prior. Otherwise stable findings as above.    < end of copied text >       [place] [facility]                                                               Office:  [address] [address]                                                              Telephone: [phone]. Fax:[phone]                                                                        CARDIOLOGY CONSULTATION NOTE                                                                                             Consult requested by:  Daisha   Reason for Consultation: dyspnea   History obtained by: Patient and medical record   obtained: No  Cardiologist: none    Chief complaint:    Patient is a 65y old  Female who presents with a chief complaint of Shortness of breath (12 Dec 2020 18:02)    HPI:  64 yo F with hx of DM, poor historian, presents to ED with c/o SOB. Worsening over past month, sleeps with 2 pillows at night, propped up. Current smoker 0.5 PPD. Records show cath in , EF 50%, normal Cors. Pt denies having any cardiac cath in past. CXR-cardiomegaly, pulm congestion; BNP elevated. TTE pending. Denies fever, chills, edema, chest pain, pressure, palpitations, irregular, fast heart beat, nausea, vomiting, melena, rectal bleed, hematuria, lightheadedness, dizziness, syncope, near syncope.      REVIEW OF SYMPTOMS:   CONSTITUTIONAL: No fever, no weight loss, no fatigue, no weight gain   ENMT:  No difficulty hearing, tinnitus, vertigo; No sinus or throat pain  NECK: No pain or stiffness  CARDIOVASCULAR: No chest pain, + dyspnea, no syncope, no palpitations, no dizziness, no Orthopnea, no Paroxsymal nocturnal dyspnea  RESPIRATORY: + Dyspnea on exertion, + Shortness of breath, + cough, no wheezing  : No dysuria, no hematuria   GI: No dark color stool, no melena, no diarrhea, no constipation, no abdominal pain   NEURO: No headache, no dizziness, no slurred speech   MUSCULOSKELETAL: No joint pain or swelling; No muscle, back, or extremity pain  PSYCH: No agitation, no anxiety.    ALL OTHER REVIEW OF SYSTEMS ARE NEGATIVE.      PREVIOUS DIAGNOSTIC TESTING  ECHO FINDINGS:  pending    CATHETERIZATION FINDINGS:   < from: Cardiac Cath Lab - Adult (14 @ 17:05) >  VENTRICLES: Global left ventricular function was normal. EF estimated was  50 %.  VALVES: MITRAL VALVE: The mitral valve exhibited no regurgitation.  CORONARY VESSELS: The coronary circulation is right dominant.  LM:   --  LM: Angiography showed minor luminal irregularities with no flow  limiting lesions.  LAD:   --  LAD: Angiography showed minor luminal irregularities with no  flow limiting lesions.  CX:   --  Circumflex: Angiography showed minor luminal irregularities with  no flow limiting lesions.  RCA:   --  RCA: Angiography showed minor luminal irregularities with no  flow limiting lesions.  COMPLICATIONS: No complications occurred during the cath lab visit.  DIAGNOSTIC IMPRESSIONS: No significant coronary artery disease with  preserved LV function.  DIAGNOSTIC RECOMMENDATIONS: Continue current medical management.  Prepared and signed by  Andre Hays MD  Signed 2014 17:57:19    < end of copied text >        ALLERGIES: Allergies  No Known Allergies  Intolerances        PAST MEDICAL HISTORY  Endometrial hyperplasia  Osteoarthritis  BEATRICE (obstructive sleep apnea)  Morbid obesity with BMI of 40.0-44.9, adult  Insomnia  Anxiety  Depression  Type 2 diabetes mellitus  Hyperlipidemia  HTN (hypertension)      PAST SURGICAL HISTORY  S/P shoulder surgery  S/P  section      FAMILY HISTORY:  No pertinent family history in first degree relatives        SOCIAL HISTORY:  Denies smoking/alcohol/drugs  CIGARETTES: o.5 PPD     ALCOHOL: denies   DRUGS: denies       CURRENT MEDICATIONS:  furosemide   Injectable 40 milliGRAM(s) IV Push daily   dextrose 40% Gel  dextrose 5%.  dextrose 50% Injectable  enoxaparin Injectable  glucagon  Injectable  influenza   Vaccine  insulin lispro (ADMELOG) corrective regimen sliding scale  insulin lispro (ADMELOG) corrective regimen sliding scale  nicotine -   7 mG/24Hr(s) Patch        HOME MEDICATIONS:      Vital Signs Last 24 Hrs  T(C): 36.7 (13 Dec 2020 04:45), Max: 36.7 (12 Dec 2020 12:46)  T(F): 98.1 (13 Dec 2020 04:45), Max: 98.1 (13 Dec 2020 04:45)  HR: 79 (13 Dec 2020 04:45) (77 - 91)  BP: 134/67 (13 Dec 2020 04:45) (109/60 - 151/86)  RR: 22 (13 Dec 2020 04:45) (18 - 22)  SpO2: 97% (13 Dec 2020 04:45) (95% - 97%)      PHYSICAL EXAM:  Constitutional: Comfortable . No acute distress.   HEENT: Atraumatic and normocephalic , neck is supple . no JVD. No carotid bruit. PEERL   CNS: A&Ox3. No focal deficits. EOMI. Cranial nerves II-IX are intact.   Respiratory: CTAB, ulabored   Cardiovascular: S1S2 RRR. No murmur/rubs or gallop.  Gastrointestinal: Soft non-tender and non distended . +Bowel sounds. negative Saunders's sign.  Extremities: No edema.   Psychiatric: Calm . no agitation.  Skin: No skin rash/ulcers visualized to face, hands or feet.    Intake and output:    @ 07:01  -   @ 07:00  --------------------------------------------------------  IN: 0 mL / OUT: 500 mL / NET: -500 mL    LABS:                        10.7   8.51  )-----------( 224      ( 13 Dec 2020 05:30 )             34.5         139  |  105  |  12.0  ----------------------------<  166<H>  3.6   |  26.0  |  0.74    Ca    8.7      13 Dec 2020 05:30  Mg     1.8         TPro  5.8<L>  /  Alb  3.2<L>  /  TBili  0.7  /  DBili  x   /  AST  32<H>  /  ALT  34<H>  /  AlkPhos  110      CARDIAC MARKERS ( 12 Dec 2020 21:44 )  x     / <0.01 ng/mL / x     / x     / x      CARDIAC MARKERS ( 12 Dec 2020 15:33 )  x     / <0.01 ng/mL / x     / x     / x        ;p-BNP=Serum Pro-Brain Natriuretic Peptide: 2549 pg/mL ( @ 15:33)      Urinalysis Basic - ( 12 Dec 2020 16:47 )    Color: Yellow / Appearance: Clear / S.015 / pH: x  Gluc: x / Ketone: Trace  / Bili: Negative / Urobili: 1 mg/dL   Blood: x / Protein: 30 mg/dL / Nitrite: Negative   Leuk Esterase: Negative / RBC: Negative /HPF / WBC 0-2   Sq Epi: x / Non Sq Epi: Few / Bacteria: x      INTERPRETATION OF TELEMETRY: SR  ECG: Reviewed by me.     < from: Xray Chest 1 View-PORTABLE IMMEDIATE (20 @ 14:25) >  IMPRESSION: Small right effusion increased from prior. Otherwise stable findings as above.    < end of copied text >       [place] [facility]                                                               Office:  [address] [address]                                                              Telephone: [phone]. Fax:[phone]                                                                        CARDIOLOGY CONSULTATION NOTE                                                                                             Consult requested by:  Daisha   Reason for Consultation: dyspnea   History obtained by: Patient and medical record   obtained: No  Cardiologist: none    Chief complaint:    Patient is a 65y old  Female who presents with a chief complaint of Shortness of breath (12 Dec 2020 18:02)    HPI:  64 yo F with hx of DM, poor historian, presents to ED with c/o SOB. Worsening over past month, sleeps with 2 pillows at night, propped up. Current smoker 0.5 PPD. Records show cath in , EF 50%, normal Cors. Pt denies having any cardiac cath in past. CXR-cardiomegaly, pulm congestion; BNP elevated. TTE pending. Denies fever, chills, edema, chest pain, pressure, palpitations, irregular, fast heart beat, nausea, vomiting, melena, rectal bleed, hematuria, lightheadedness, dizziness, syncope, near syncope.      REVIEW OF SYMPTOMS:   CONSTITUTIONAL: No fever, no weight loss, no fatigue, no weight gain   ENMT:  No difficulty hearing, tinnitus, vertigo; No sinus or throat pain  NECK: No pain or stiffness  CARDIOVASCULAR: No chest pain, + dyspnea, no syncope, no palpitations, no dizziness, no Orthopnea, no Paroxsymal nocturnal dyspnea  RESPIRATORY: + Dyspnea on exertion, + Shortness of breath, + cough, no wheezing  : No dysuria, no hematuria   GI: No dark color stool, no melena, no diarrhea, no constipation, no abdominal pain   NEURO: No headache, no dizziness, no slurred speech   MUSCULOSKELETAL: No joint pain or swelling; No muscle, back, or extremity pain  PSYCH: No agitation, no anxiety.    ALL OTHER REVIEW OF SYSTEMS ARE NEGATIVE.      PREVIOUS DIAGNOSTIC TESTING  ECHO FINDINGS:  pending    CATHETERIZATION FINDINGS:   < from: Cardiac Cath Lab - Adult (14 @ 17:05) >  VENTRICLES: Global left ventricular function was normal. EF estimated was  50 %.  VALVES: MITRAL VALVE: The mitral valve exhibited no regurgitation.  CORONARY VESSELS: The coronary circulation is right dominant.  LM:   --  LM: Angiography showed minor luminal irregularities with no flow  limiting lesions.  LAD:   --  LAD: Angiography showed minor luminal irregularities with no  flow limiting lesions.  CX:   --  Circumflex: Angiography showed minor luminal irregularities with  no flow limiting lesions.  RCA:   --  RCA: Angiography showed minor luminal irregularities with no  flow limiting lesions.  COMPLICATIONS: No complications occurred during the cath lab visit.  DIAGNOSTIC IMPRESSIONS: No significant coronary artery disease with  preserved LV function.  DIAGNOSTIC RECOMMENDATIONS: Continue current medical management.  Prepared and signed by  Andre Hays MD  Signed 2014 17:57:19    ALLERGIES: Allergies  No Known Allergies  Intolerances    PAST MEDICAL HISTORY  Endometrial hyperplasia  Osteoarthritis  BEATRICE (obstructive sleep apnea)  Morbid obesity with BMI of 40.0-44.9, adult  Insomnia  Anxiety  Depression  Type 2 diabetes mellitus  Hyperlipidemia  HTN (hypertension)    PAST SURGICAL HISTORY  S/P shoulder surgery  S/P  section    FAMILY HISTORY:  No pertinent family history in first degree relatives    SOCIAL HISTORY: Smokes cigs, no ertoh or drug use   CIGARETTES: o.5 PPD     ALCOHOL: denies   DRUGS: denies       CURRENT MEDICATIONS:  furosemide   Injectable 40 milliGRAM(s) IV Push daily   dextrose 40% Gel  dextrose 5%.  dextrose 50% Injectable  enoxaparin Injectable  glucagon  Injectable  influenza   Vaccine  insulin lispro (ADMELOG) corrective regimen sliding scale  insulin lispro (ADMELOG) corrective regimen sliding scale  nicotine -   7 mG/24Hr(s) Patch    Vital Signs Last 24 Hrs  T(C): 36.7 (13 Dec 2020 04:45), Max: 36.7 (12 Dec 2020 12:46)  T(F): 98.1 (13 Dec 2020 04:45), Max: 98.1 (13 Dec 2020 04:45)  HR: 79 (13 Dec 2020 04:45) (77 - 91)  BP: 134/67 (13 Dec 2020 04:45) (109/60 - 151/86)  RR: 22 (13 Dec 2020 04:45) (18 - 22)  SpO2: 97% (13 Dec 2020 04:45) (95% - 97%)    PHYSICAL EXAM:  Constitutional: Comfortable . No acute distress.   HEENT: Atraumatic and normocephalic , neck is supple . no JVD. No carotid bruit. PEERL   CNS: A&Ox3. No focal deficits. EOMI. Cranial nerves II-IX are intact.   Respiratory: CTAB,   Cardiovascular: S1S2 RRR. 2/6 sm lsb  Gastrointestinal: Soft non-tender and non distended . +Bowel sounds. negative Saunders's sign.  Extremities: No edema.   Psychiatric: Calm . no agitation.  Skin: No skin rash/ulcers visualized to face, hands or feet.    Intake and output:    @ 07:01  -   @ 07:00  --------------------------------------------------------  IN: 0 mL / OUT: 500 mL / NET: -500 mL    LABS:                        10.7   8.51  )-----------( 224      ( 13 Dec 2020 05:30 )             34.5         139  |  105  |  12.0  ----------------------------<  166<H>  3.6   |  26.0  |  0.74    Ca    8.7      13 Dec 2020 05:30  Mg     1.8         TPro  5.8<L>  /  Alb  3.2<L>  /  TBili  0.7  /  DBili  x   /  AST  32<H>  /  ALT  34<H>  /  AlkPhos  110      CARDIAC MARKERS ( 12 Dec 2020 21:44 )  x     / <0.01 ng/mL / x     / x     / x      CARDIAC MARKERS ( 12 Dec 2020 15:33 )  x     / <0.01 ng/mL / x     / x     / x        ;p-BNP=Serum Pro-Brain Natriuretic Peptide: 2549 pg/mL ( @ 15:33)      Urinalysis Basic - ( 12 Dec 2020 16:47 )    Color: Yellow / Appearance: Clear / S.015 / pH: x  Gluc: x / Ketone: Trace  / Bili: Negative / Urobili: 1 mg/dL   Blood: x / Protein: 30 mg/dL / Nitrite: Negative   Leuk Esterase: Negative / RBC: Negative /HPF / WBC 0-2   Sq Epi: x / Non Sq Epi: Few / Bacteria: x      INTERPRETATION OF TELEMETRY: SR  ECG: Reviewed by me.     < from: Xray Chest 1 View-PORTABLE IMMEDIATE (20 @ 14:25) >  IMPRESSION: Small right effusion increased from prior. Otherwise stable findings as above.    < end of copied text >

## 2020-12-14 LAB
ALBUMIN SERPL ELPH-MCNC: 3.3 G/DL — SIGNIFICANT CHANGE UP (ref 3.3–5.2)
ALP SERPL-CCNC: 114 U/L — SIGNIFICANT CHANGE UP (ref 40–120)
ALT FLD-CCNC: 28 U/L — SIGNIFICANT CHANGE UP
ANION GAP SERPL CALC-SCNC: 11 MMOL/L — SIGNIFICANT CHANGE UP (ref 5–17)
AST SERPL-CCNC: 20 U/L — SIGNIFICANT CHANGE UP
BASE EXCESS BLDA CALC-SCNC: 8.7 MMOL/L — HIGH (ref -2–2)
BILIRUB SERPL-MCNC: 0.5 MG/DL — SIGNIFICANT CHANGE UP (ref 0.4–2)
BLOOD GAS COMMENTS ARTERIAL: SIGNIFICANT CHANGE UP
BUN SERPL-MCNC: 14 MG/DL — SIGNIFICANT CHANGE UP (ref 8–20)
CALCIUM SERPL-MCNC: 8.7 MG/DL — SIGNIFICANT CHANGE UP (ref 8.6–10.2)
CHLORIDE SERPL-SCNC: 103 MMOL/L — SIGNIFICANT CHANGE UP (ref 98–107)
CO2 SERPL-SCNC: 29 MMOL/L — SIGNIFICANT CHANGE UP (ref 22–29)
CREAT SERPL-MCNC: 0.78 MG/DL — SIGNIFICANT CHANGE UP (ref 0.5–1.3)
GAS PNL BLDA: SIGNIFICANT CHANGE UP
GLUCOSE BLDC GLUCOMTR-MCNC: 151 MG/DL — HIGH (ref 70–99)
GLUCOSE BLDC GLUCOMTR-MCNC: 180 MG/DL — HIGH (ref 70–99)
GLUCOSE BLDC GLUCOMTR-MCNC: 183 MG/DL — HIGH (ref 70–99)
GLUCOSE BLDC GLUCOMTR-MCNC: 193 MG/DL — HIGH (ref 70–99)
GLUCOSE SERPL-MCNC: 216 MG/DL — HIGH (ref 70–99)
HCO3 BLDA-SCNC: 32 MMOL/L — HIGH (ref 20–26)
HCT VFR BLD CALC: 33.8 % — LOW (ref 34.5–45)
HGB BLD-MCNC: 10.6 G/DL — LOW (ref 11.5–15.5)
HOROWITZ INDEX BLDA+IHG-RTO: SIGNIFICANT CHANGE UP
MAGNESIUM SERPL-MCNC: 1.8 MG/DL — SIGNIFICANT CHANGE UP (ref 1.6–2.6)
MCHC RBC-ENTMCNC: 23.3 PG — LOW (ref 27–34)
MCHC RBC-ENTMCNC: 31.4 GM/DL — LOW (ref 32–36)
MCV RBC AUTO: 74.4 FL — LOW (ref 80–100)
NT-PROBNP SERPL-SCNC: 1193 PG/ML — HIGH (ref 0–300)
PCO2 BLDA: 51 MMHG — HIGH (ref 35–45)
PH BLDA: 7.43 — SIGNIFICANT CHANGE UP (ref 7.35–7.45)
PLATELET # BLD AUTO: 236 K/UL — SIGNIFICANT CHANGE UP (ref 150–400)
PO2 BLDA: 106 MMHG — SIGNIFICANT CHANGE UP (ref 83–108)
POTASSIUM SERPL-MCNC: 2.8 MMOL/L — CRITICAL LOW (ref 3.5–5.3)
POTASSIUM SERPL-MCNC: 3.9 MMOL/L — SIGNIFICANT CHANGE UP (ref 3.5–5.3)
POTASSIUM SERPL-SCNC: 2.8 MMOL/L — CRITICAL LOW (ref 3.5–5.3)
POTASSIUM SERPL-SCNC: 3.9 MMOL/L — SIGNIFICANT CHANGE UP (ref 3.5–5.3)
PROT SERPL-MCNC: 5.9 G/DL — LOW (ref 6.6–8.7)
RBC # BLD: 4.54 M/UL — SIGNIFICANT CHANGE UP (ref 3.8–5.2)
RBC # FLD: 15.4 % — HIGH (ref 10.3–14.5)
SAO2 % BLDA: 99 % — SIGNIFICANT CHANGE UP (ref 95–99)
SODIUM SERPL-SCNC: 142 MMOL/L — SIGNIFICANT CHANGE UP (ref 135–145)
WBC # BLD: 8.23 K/UL — SIGNIFICANT CHANGE UP (ref 3.8–10.5)
WBC # FLD AUTO: 8.23 K/UL — SIGNIFICANT CHANGE UP (ref 3.8–10.5)

## 2020-12-14 PROCEDURE — 99233 SBSQ HOSP IP/OBS HIGH 50: CPT

## 2020-12-14 PROCEDURE — 93306 TTE W/DOPPLER COMPLETE: CPT | Mod: 26

## 2020-12-14 PROCEDURE — 99232 SBSQ HOSP IP/OBS MODERATE 35: CPT

## 2020-12-14 RX ORDER — POTASSIUM CHLORIDE 20 MEQ
40 PACKET (EA) ORAL EVERY 4 HOURS
Refills: 0 | Status: COMPLETED | OUTPATIENT
Start: 2020-12-14 | End: 2020-12-14

## 2020-12-14 RX ORDER — POTASSIUM CHLORIDE 20 MEQ
10 PACKET (EA) ORAL ONCE
Refills: 0 | Status: COMPLETED | OUTPATIENT
Start: 2020-12-14 | End: 2020-12-14

## 2020-12-14 RX ADMIN — Medication 1 PATCH: at 20:01

## 2020-12-14 RX ADMIN — Medication 100 MILLIEQUIVALENT(S): at 08:05

## 2020-12-14 RX ADMIN — Medication 40 MILLIGRAM(S): at 05:29

## 2020-12-14 RX ADMIN — Medication 2: at 16:51

## 2020-12-14 RX ADMIN — ENOXAPARIN SODIUM 40 MILLIGRAM(S): 100 INJECTION SUBCUTANEOUS at 11:05

## 2020-12-14 RX ADMIN — Medication 2: at 11:01

## 2020-12-14 RX ADMIN — Medication 1 PATCH: at 11:01

## 2020-12-14 RX ADMIN — Medication 1 PATCH: at 00:00

## 2020-12-14 RX ADMIN — Medication 40 MILLIEQUIVALENT(S): at 11:05

## 2020-12-14 RX ADMIN — Medication 2: at 08:05

## 2020-12-14 RX ADMIN — Medication 40 MILLIEQUIVALENT(S): at 05:30

## 2020-12-14 NOTE — PROGRESS NOTE ADULT - ASSESSMENT
66 yo F with hx of DM, poor historian, presents to ED with c/o SOB. Worsening over past month, sleeps with 2 pillows at night, propped up. Current smoker 0.5 PPD. Records show cath in 2014, EF 50%, normal Cors. Pt denies having any cardiac cath in past. CXR-cardiomegaly, pulm congestion; BNP elevated. TTE pending.     Shortness of breath  TTE pending  cont lasix 40mg IV daily- will eval to change to PO tomorrow   Strict I and O  [facility] 2014- EF 50%, normal cors   cont losartan 50mg PO daily  will optimize medications after TTE     DM   as per primary team  A1C 7.5    HTN  monitor as per routine  losartan 50mg daily

## 2020-12-14 NOTE — PHYSICAL THERAPY INITIAL EVALUATION ADULT - ADDITIONAL COMMENTS
Pt. lives in a house with her son who works out of the house during the day. No stairs to enter and no stairs inside. Pt. was modified independent with a [employer] PTA and does not own any other DME.

## 2020-12-14 NOTE — PROGRESS NOTE ADULT - ASSESSMENT
Pt is a 66 y/o F with hx of DM who comes to the ER for shortness of breath. In  the ED she was noted to have an elevated BNP and a cxr which showed cardiomegaly and a slight increase in the right sided pleural effusion. She was given IV Lasix and [place] cardiology was consulted. CXR with cardiomegaly and right effusion. s/p lasix in ED. COVID negative. She is being admitted for Shortness of breath/PEDERSON likely due to acute CHF.     Shortness of breath/PEDERSON likely due to acute CHF  - CXR with cardiomegaly and right effusion elevated BNP  - Daily weights and strict Is and Os  - Fluid restriction  - Lasix 40mg IV daily  - Replete K >4 and Mg >2  - Cardiology consulted   - monitor on tele and   - BNP 1193, troponin neg x2  - Wean O2 as tolerated  - TTE pending     Hypokalemia  - Given K 40 meq x2 overnight  - 1 K rider ordered this AM   - Repeat BMP pending   - Consider initiating K 20meq QD on DC    DM   - On metformin at home  - ISS and hypoglycemic protocol  - A1c 7.5    Tobacco use  - Nicotine patch     Obesity   BMI 36  - Counseled on weight loss   - Low fat diet    dvt ppx: Lovenox    Dispo: PT ordered to assess for needs, likely home in 24-48 hours pending improvement. Pt is a 66 y/o F with hx of DM who comes to the ER for shortness of breath. In  the ED she was noted to have an elevated BNP and a cxr which showed cardiomegaly and a slight increase in the right sided pleural effusion. She was given IV Lasix and [place] [facility]ology was consulted. CXR with cardiomegaly and right effusion. s/p lasix in ED. COVID negative. She is being admitted for Shortness of breath/PEDERSON likely due to acute CHF.     Shortness of breath/PEDERSON likely due to acute CHF  - CXR with cardiomegaly and right effusion elevated BNP  - Daily weights and strict Is and Os  - Fluid restriction  - Lasix 40mg IV daily  - Replete K >4 and Mg >2  - Cardiology consulted   - monitor on tele and   - BNP 1193, troponin neg x2  - Wean O2 as tolerated  - TTE pending     Hypokalemia  - Given K 40 meq x 2 overnight  - 1 K Bebeto ordered this AM   - Repeat Potassium pending   - Consider initiating K 20meq QD on DC    DM   - On metformin at home  - ISS and hypoglycemic protocol  - A1c 7.5    Tobacco use  - Nicotine patch     Obesity (BMI 36)  - Counseled on weight loss   - Low fat diet    dvt ppx: Lovenox    Dispo: PT consult pending, likely home in 24-48 hours pending improvement. Pt is a 66 y/o F with hx of DM who comes to the ER for shortness of breath. In  the ED she was noted to have an elevated BNP and a cxr which showed cardiomegaly and a slight increase in the right sided pleural effusion. She was given IV Lasix and [place]ology was consulted. CXR with cardiomegaly and right effusion. s/p lasix in ED. COVID negative. She is being admitted for Shortness of breath/PEDERSON likely due to acute CHF.     Shortness of breath/PEDERSON likely due to acute CHF  - CXR with cardiomegaly and right effusion elevated BNP  - Daily weights and strict Is and Os  - Fluid restriction  - Lasix 40mg IV daily, cardio will eval to change to PO tomorrow   - Replete K >4 and Mg >2  - On  & tele monitor  - BNP 1193, troponin neg x2  - Wean O2 as tolerated  - TTE pending   - Cardio reccs appreciated    Hypokalemia  - Given K 40 meq x 2 overnight  - 1 K Bebeto ordered this AM   - Repeat Potassium pending   - Consider initiating K 20meq QD on DC    DM   - On metformin at home  - ISS and hypoglycemic protocol  - A1c 7.5    Tobacco use  - Nicotine patch     Obesity (BMI 36)  - Counseled on weight loss   - Low fat diet    dvt ppx: Lovenox    Dispo: PT consult pending, likely home in 24-48 hours pending improvement. Pt is a 66 y/o F with hx of DM who comes to the ER for shortness of breath. In  the ED she was noted to have an elevated BNP and a cxr which showed cardiomegaly and a slight increase in the right sided pleural effusion. She was given IV Lasix and [place] cardiology was consulted. CXR with cardiomegaly and right effusion. s/p lasix in ED. COVID negative. She is being admitted for Shortness of breath/PEDERSON likely due to acute CHF.     Shortness of breath/PEDERSON likely due to acute CHF  - CXR with cardiomegaly and right effusion elevated BNP  - Daily weights and strict Is and Os  - Fluid restriction  - Lasix 40mg IV daily, cardio will eval to change to PO tomorrow   - Replete K >4 and Mg >2  - On  & tele monitor  - BNP 1193, troponin neg x2  - Wean O2 as tolerated  - TTE pending   - Cardio reccs appreciated    Hypokalemia  - Given K 40 meq x 2 overnight  - 1 K Bebeto ordered this AM   - Repeat Potassium pending   - Consider initiating K 20meq QD on DC    DM  - POCT 193, c/w ISS & hypoglycemic protocol    - On metformin at home  - A1c 7.5    Tobacco use  - Nicotine patch     Obesity (BMI 36)  - Counseled on weight loss   - Low fat diet    dvt ppx: Lovenox    Dispo: PT consult pending, likely home in 24-48 hours pending improvement.

## 2020-12-14 NOTE — PROGRESS NOTE ADULT - SUBJECTIVE AND OBJECTIVE BOX
[facility]                                                               Office: [address], [place] [address]                                                              Telephone: [phone]. Fax:[phone]                                                                             PROGRESS NOTE  Reason for follow up: Dyspnea   Overnight: No new events.   Update: Pt seen in CDU, sitting up in stretcher. Denies chest pain, dyspnea, SOB. States feels better. Potassium this am 2.8, IV infusing and PO given. TTE today.        Review of symptoms:   Cardiac:  No chest pain. No dyspnea. No palpitations.  Respiratory:no cough. No dyspnea  Gastrointestinal: No diarrhea. No abdominal pain. No bleeding.   Neuro: No focal neuro complaints.      Vitals:  T(C): 36.9 (12-14-20 @ 07:29), Max: 36.9 (12-14-20 @ 07:29)  HR: 82 (12-14-20 @ 07:29) (80 - 87)  BP: 154/101 (12-14-20 @ 07:29) (130/67 - 154/101)  RR: 18 (12-14-20 @ 07:29) (18 - 20)  SpO2: 100% (12-14-20 @ 07:29) (95% - 100%)    I&O's Summary    13 Dec 2020 07:01  -  14 Dec 2020 07:00  --------------------------------------------------------  IN: 420 mL / OUT: 650 mL / NET: -230 mL    14 Dec 2020 07:01  -  14 Dec 2020 09:51  --------------------------------------------------------  IN: 100 mL / OUT: 0 mL / NET: 100 mL      Weight (kg): 97.5 (12-12 @ 12:46)      PHYSICAL EXAM:  Appearance: Comfortable. No acute distress  HEENT:  Atraumatic. Normocephalic.  Normal oral mucosa, PERRL,   Neurologic: A & O x 3, no focal deficits. EOMI.  Cardiovascular: Normal S1 S2, No murmur, rubs/gallops. No JVD  Respiratory: diminshed, poor air movement=, unlabored   Gastrointestinal:  Soft, Non-tender, + BS  Lower Extremities: +2 edema  Psychiatry: Patient is calm. No agitation. Mood & affect appropriate  Skin: No rashes/ ecchymoses/cyanosis/ulcers visualized on the face, hands or feet.      CURRENT MEDICATIONS:  furosemide   Injectable 40 milliGRAM(s) IV Push daily  dextrose 40% Gel  dextrose 50% Injectable  glucagon  Injectable  insulin lispro (ADMELOG) corrective regimen sliding scale  dextrose 5%.  enoxaparin Injectable  influenza   Vaccine  potassium chloride    Tablet ER        LABS:	 	  CARDIAC MARKERS ( 14 Dec 2020 04:26 )  x     / x     / x     / x     / x      p-BNP 14 Dec 2020 04:26: 1193 pg/mL, CARDIAC MARKERS ( 12 Dec 2020 21:44 )  x     / <0.01 ng/mL / x     / x     / x      p-BNP 12 Dec 2020 21:44: x    , CARDIAC MARKERS ( 12 Dec 2020 15:33 )  x     / <0.01 ng/mL / x     / x     / x      p-BNP 12 Dec 2020 15:33: 2549 pg/mL                          10.6   8.23  )-----------( 236      ( 14 Dec 2020 04:26 )             33.8     12-14    142  |  103  |  14.0  ----------------------------<  216<H>  2.8<LL>   |  29.0  |  0.78    Ca    8.7      14 Dec 2020 04:26  Mg     1.8     12-14    TPro  5.9<L>  /  Alb  3.3  /  TBili  0.5  /  DBili  x   /  AST  20  /  ALT  28  /  AlkPhos  114  12-14    proBNP: Serum Pro-Brain Natriuretic Peptide: 1193 pg/mL (12-14 @ 04:26)  Serum Pro-Brain Natriuretic Peptide: 2549 pg/mL (12-12 @ 15:33)    TELEMETRY: SR 83,. PVCs

## 2020-12-14 NOTE — PROGRESS NOTE ADULT - SUBJECTIVE AND OBJECTIVE BOX
CC:   INTERVAL HPI/OVERNIGHT EVENTS: Pt seen and examined at bedside this AM by PA.     REVIEW OF SYSTEMS:  CONSTITUTIONAL: No fever, weight loss, or fatigue  EYES: No eye pain, visual disturbances, or discharge  ENT:  No difficulty hearing, tinnitus, vertigo; No sinus or throat pain  NECK: No pain or stiffness  RESPIRATORY: No cough, wheezing, chills or hemoptysis; No shortness of breath  CARDIOVASCULAR: No chest pain, palpitations, dizziness, or leg swelling  GASTROINTESTINAL: No abdominal or epigastric pain. No nausea, vomiting or hematemesis; No diarrhea or constipation  GENITOURINARY: No dysuria, frequency, hematuria, or incontinence  NEUROLOGICAL: No headaches, memory loss, loss of strength, numbness, or tremors  SKIN: No itching, burning, rashes, or lesions   MUSCULOSKELETAL: No joint pain or swelling; No muscle, back, or extremity pain    Allergies    No Known Allergies    Intolerances      HEALTH ISSUES - PROBLEM Dx:      PAST MEDICAL & SURGICAL HISTORY:  Endometrial hyperplasia    Osteoarthritis    BEATRICE (obstructive sleep apnea)    Morbid obesity with BMI of 40.0-44.9, adult    Insomnia    Anxiety    Depression    Type 2 diabetes mellitus    Hyperlipidemia    HTN (hypertension)    S/P shoulder surgery  right 1985    S/P  section  x 1        VITAL SIGNS:  T(C): 36.9 (20 @ 07:29), Max: 36.9 (20 @ 07:29)  HR: 82 (20 @ 07:29) (80 - 87)  BP: 154/101 (20 @ 07:29) (130/67 - 154/101)  RR: 18 (20 @ 07:29) (18 - 20)  SpO2: 100% (20 @ 07:29) (95% - 100%)  Wt(kg): --Vital Signs Last 24 Hrs  T(C): 36.9 (14 Dec 2020 07:29), Max: 36.9 (14 Dec 2020 07:29)  T(F): 98.5 (14 Dec 2020 07:), Max: 98.5 (14 Dec 2020 07:29)  HR: 82 (14 Dec 2020 07:) (80 - 87)  BP: 154/101 (14 Dec 2020 07:29) (130/67 - 154/101)  BP(mean): --  RR: 18 (14 Dec 2020 07:29) (18 - 20)  SpO2: 100% (14 Dec 2020 07:29) (95% - 100%)    PHYSICAL EXAM:  GENERAL: Pt lying in bed comfortably in NAD  HEAD:  Atraumatic  EYES: EOMI, PERRL, conjunctiva and sclera clear  ENT: Moist mucous membranes  NECK: Supple, No JVD  CHEST/LUNG: Clear to auscultation bilaterally; No rales, rhonchi, wheezing, or rubs. Unlabored respirations  HEART: Regular rate and rhythm; No murmurs, rubs, or gallops  ABDOMEN: Bowel sounds present; Soft, Nontender, Nondistended. No guarding or rigidity   EXTREMITIES:  2+ Peripheral Pulses, brisk capillary refill. No clubbing, cyanosis, or edema. FROM and strength x 4 extremities.  NERVOUS SYSTEM:  Alert & Oriented X3, speech clear,  No deficits   SKIN: Warm and dry     LABS:                        10.6   8.23  )-----------( 236      ( 14 Dec 2020 04:26 )             33.8     12-14    142  |  103  |  14.0  ----------------------------<  216<H>  2.8<LL>   |  29.0  |  0.78    Ca    8.7      14 Dec 2020 04:26  Mg     1.8     12-14    TPro  5.9<L>  /  Alb  3.3  /  TBili  0.5  /  DBili  x   /  AST  20  /  ALT  28  /  AlkPhos  114  12-14        Urinalysis Basic - ( 12 Dec 2020 16:47 )    Color: Yellow / Appearance: Clear / S.015 / pH: x  Gluc: x / Ketone: Trace  / Bili: Negative / Urobili: 1 mg/dL   Blood: x / Protein: 30 mg/dL / Nitrite: Negative   Leuk Esterase: Negative / RBC: Negative /HPF / WBC 0-2   Sq Epi: x / Non Sq Epi: Few / Bacteria: x     CC:   INTERVAL HPI/OVERNIGHT EVENTS: Pt seen and examined at bedside this AM by PA.     REVIEW OF SYSTEMS:  CONSTITUTIONAL: No fever, weight loss, or fatigue  EYES: No eye pain, visual disturbances, or discharge  ENT:  No difficulty hearing, tinnitus, vertigo; No sinus or throat pain  NECK: No pain or stiffness  RESPIRATORY: No cough, wheezing, chills or hemoptysis; No shortness of breath  CARDIOVASCULAR: No chest pain, palpitations, dizziness, or leg swelling  GASTROINTESTINAL: No abdominal or epigastric pain. No nausea, vomiting or hematemesis; No diarrhea or constipation  GENITOURINARY: No dysuria, frequency, hematuria, or incontinence  NEUROLOGICAL: No headaches, memory loss, loss of strength, numbness, or tremors  SKIN: No itching, burning, rashes, or lesions   MUSCULOSKELETAL: No joint pain or swelling; No muscle, back, or extremity pain    Allergies:No Known Allergies    PAST MEDICAL & SURGICAL HISTORY:  Endometrial hyperplasia  Osteoarthritis  BEATRICE (obstructive sleep apnea)  Morbid obesity with BMI of 40.0-44.9, adult  Insomnia  Anxiety  Depression  Type 2 diabetes mellitus  Hyperlipidemia  HTN (hypertension)  S/P shoulder surgery, right 1985  S/P  section x 1      VITAL SIGNS:  T(C): 36.9 (20 @ 07:29), Max: 36.9 (20 @ 07:29)  HR: 82 (20 @ 07:29) (80 - 87)  BP: 154/101 (20 @ 07:29) (130/67 - 154/101)  RR: 18 (20 @ 07:29) (18 - 20)  SpO2: 100% (20 @ 07:29) (95% - 100%)  Wt(kg): --Vital Signs Last 24 Hrs  T(C): 36.9 (14 Dec 2020 07:29), Max: 36.9 (14 Dec 2020 07:29)  T(F): 98.5 (14 Dec 2020 07:), Max: 98.5 (14 Dec 2020 07:29)  HR: 82 (14 Dec 2020 07:29) (80 - 87)  BP: 154/101 (14 Dec 2020 07:29) (130/67 - 154/101)  BP(mean): --  RR: 18 (14 Dec 2020 07:) (18 - 20)  SpO2: 100% (14 Dec 2020 07:29) (95% - 100%)    PHYSICAL EXAM:  GENERAL: Pt lying in bed comfortably in NAD  HEAD:  Atraumatic  EYES: EOMI, PERRL, conjunctiva and sclera clear  ENT: Moist mucous membranes  NECK: Supple, No JVD  CHEST/LUNG: Clear to auscultation bilaterally; No rales, rhonchi, wheezing, or rubs. Unlabored respirations  HEART: Regular rate and rhythm; No murmurs, rubs, or gallops  ABDOMEN: Bowel sounds present; Soft, Nontender, Nondistended. No guarding or rigidity   EXTREMITIES:  2+ Peripheral Pulses, brisk capillary refill. No clubbing, cyanosis, or edema. FROM and strength x 4 extremities.  NERVOUS SYSTEM:  Alert & Oriented X3, speech clear,  No deficits   SKIN: Warm and dry     LABS:                        10.6   8.23  )-----------( 236      ( 14 Dec 2020 04:26 )             33.8     12-14    142  |  103  |  14.0  ----------------------------<  216<H>  2.8<LL>   |  29.0  |  0.78    Ca    8.7      14 Dec 2020 04:26  Mg     1.8     12-14    TPro  5.9<L>  /  Alb  3.3  /  TBili  0.5  /  DBili  x   /  AST  20  /  ALT  28  /  AlkPhos  114  12-14        Urinalysis Basic - ( 12 Dec 2020 16:47 )    Color: Yellow / Appearance: Clear / S.015 / pH: x  Gluc: x / Ketone: Trace  / Bili: Negative / Urobili: 1 mg/dL   Blood: x / Protein: 30 mg/dL / Nitrite: Negative   Leuk Esterase: Negative / RBC: Negative /HPF / WBC 0-2   Sq Epi: x / Non Sq Epi: Few / Bacteria: x     CC: SOB  INTERVAL HPI/OVERNIGHT EVENTS: Pt seen and examined at bedside this AM by PA.     REVIEW OF SYSTEMS:  CONSTITUTIONAL: No fever, or fatigue  RESPIRATORY: Denies cough, chills; No shortness of breath  CARDIOVASCULAR: No chest pain, palpitations  GASTROINTESTINAL: No abdominal or epigastric pain. No nausea, vomiting.  NEUROLOGICAL: No headaches, numbness/tingling     Allergies: No Known Allergies    PAST MEDICAL & SURGICAL HISTORY:  Endometrial hyperplasia  Osteoarthritis  BEATRICE (obstructive sleep apnea)  Morbid obesity with BMI of 40.0-44.9, adult  Insomnia  Anxiety  Depression  Type 2 diabetes mellitus  Hyperlipidemia  HTN (hypertension)  S/P shoulder surgery, right 1985  S/P  section x 1    VITAL SIGNS:  T(C): 36.9 (20 @ 07:29), Max: 36.9 (20 @ 07:29)  HR: 82 (20 @ 07:29) (80 - 87)  BP: 154/101 (20 @ 07:29) (130/67 - 154/101)  RR: 18 (20 @ 07:29) (18 - 20)  SpO2: 100% (20 @ 07:29) (95% - 100%)  Wt(kg): --Vital Signs Last 24 Hrs  T(C): 36.9 (14 Dec 2020 07:29), Max: 36.9 (14 Dec 2020 07:29)  T(F): 98.5 (14 Dec 2020 07:29), Max: 98.5 (14 Dec 2020 07:29)  HR: 82 (14 Dec 2020 07:29) (80 - 87)  BP: 154/101 (14 Dec 2020 07:29) (130/67 - 154/101)  BP(mean): --  RR: 18 (14 Dec 2020 07:29) (18 - 20)  SpO2: 100% (14 Dec 2020 07:29) (95% - 100%)    PHYSICAL EXAM:  GENERAL: Pt lying in bed sleeping comfortably, arousable to voice, pt falling asleep during exam  HEAD:  Atraumatic  CHEST/LUNG: Diminished air entry at the bases bilaterally, wheezing in the b/l apices   HEART: Regular rate and rhythm  ABDOMEN: Bowel sounds present; Soft, Nontender, Nondistended.   EXTREMITIES:  2+ Peripheral Pulses, slight b/l LE edema  NERVOUS SYSTEM:  Speech clear  SKIN: Warm and dry     LABS:                        10.6   8.23  )-----------( 236      ( 14 Dec 2020 04:26 )             33.8     12-14    142  |  103  |  14.0  ----------------------------<  216<H>  2.8<LL>   |  29.0  |  0.78    Ca    8.7      14 Dec 2020 04:26  Mg     1.8         TPro  5.9<L>  /  Alb  3.3  /  TBili  0.5  /  DBili  x   /  AST  20  /  ALT  28  /  AlkPhos  114  12-14        Urinalysis Basic - ( 12 Dec 2020 16:47 )    Color: Yellow / Appearance: Clear / S.015 / pH: x  Gluc: x / Ketone: Trace  / Bili: Negative / Urobili: 1 mg/dL   Blood: x / Protein: 30 mg/dL / Nitrite: Negative   Leuk Esterase: Negative / RBC: Negative /HPF / WBC 0-2   Sq Epi: x / Non Sq Epi: Few / Bacteria: x

## 2020-12-15 LAB
ANION GAP SERPL CALC-SCNC: 13 MMOL/L — SIGNIFICANT CHANGE UP (ref 5–17)
BUN SERPL-MCNC: 11 MG/DL — SIGNIFICANT CHANGE UP (ref 8–20)
CALCIUM SERPL-MCNC: 8.8 MG/DL — SIGNIFICANT CHANGE UP (ref 8.6–10.2)
CHLORIDE SERPL-SCNC: 98 MMOL/L — SIGNIFICANT CHANGE UP (ref 98–107)
CO2 SERPL-SCNC: 28 MMOL/L — SIGNIFICANT CHANGE UP (ref 22–29)
CREAT SERPL-MCNC: 0.71 MG/DL — SIGNIFICANT CHANGE UP (ref 0.5–1.3)
GLUCOSE BLDC GLUCOMTR-MCNC: 141 MG/DL — HIGH (ref 70–99)
GLUCOSE BLDC GLUCOMTR-MCNC: 141 MG/DL — HIGH (ref 70–99)
GLUCOSE BLDC GLUCOMTR-MCNC: 169 MG/DL — HIGH (ref 70–99)
GLUCOSE BLDC GLUCOMTR-MCNC: 186 MG/DL — HIGH (ref 70–99)
GLUCOSE SERPL-MCNC: 170 MG/DL — HIGH (ref 70–99)
HCT VFR BLD CALC: 36.1 % — SIGNIFICANT CHANGE UP (ref 34.5–45)
HGB BLD-MCNC: 11.5 G/DL — SIGNIFICANT CHANGE UP (ref 11.5–15.5)
MAGNESIUM SERPL-MCNC: 1.6 MG/DL — LOW (ref 1.8–2.6)
MCHC RBC-ENTMCNC: 23.9 PG — LOW (ref 27–34)
MCHC RBC-ENTMCNC: 31.9 GM/DL — LOW (ref 32–36)
MCV RBC AUTO: 74.9 FL — LOW (ref 80–100)
PLATELET # BLD AUTO: 231 K/UL — SIGNIFICANT CHANGE UP (ref 150–400)
POTASSIUM SERPL-MCNC: 4 MMOL/L — SIGNIFICANT CHANGE UP (ref 3.5–5.3)
POTASSIUM SERPL-SCNC: 4 MMOL/L — SIGNIFICANT CHANGE UP (ref 3.5–5.3)
RBC # BLD: 4.82 M/UL — SIGNIFICANT CHANGE UP (ref 3.8–5.2)
RBC # FLD: 15.3 % — HIGH (ref 10.3–14.5)
SODIUM SERPL-SCNC: 139 MMOL/L — SIGNIFICANT CHANGE UP (ref 135–145)
WBC # BLD: 7.88 K/UL — SIGNIFICANT CHANGE UP (ref 3.8–10.5)
WBC # FLD AUTO: 7.88 K/UL — SIGNIFICANT CHANGE UP (ref 3.8–10.5)

## 2020-12-15 PROCEDURE — 99232 SBSQ HOSP IP/OBS MODERATE 35: CPT

## 2020-12-15 RX ORDER — METOPROLOL TARTRATE 50 MG
25 TABLET ORAL DAILY
Refills: 0 | Status: DISCONTINUED | OUTPATIENT
Start: 2020-12-15 | End: 2020-12-19

## 2020-12-15 RX ORDER — LOSARTAN POTASSIUM 100 MG/1
25 TABLET, FILM COATED ORAL DAILY
Refills: 0 | Status: DISCONTINUED | OUTPATIENT
Start: 2020-12-15 | End: 2020-12-18

## 2020-12-15 RX ORDER — MAGNESIUM SULFATE 500 MG/ML
2 VIAL (ML) INJECTION ONCE
Refills: 0 | Status: COMPLETED | OUTPATIENT
Start: 2020-12-15 | End: 2020-12-15

## 2020-12-15 RX ADMIN — Medication 1 PATCH: at 19:45

## 2020-12-15 RX ADMIN — Medication 50 GRAM(S): at 12:30

## 2020-12-15 RX ADMIN — Medication 2: at 08:07

## 2020-12-15 RX ADMIN — Medication 1 PATCH: at 07:49

## 2020-12-15 RX ADMIN — Medication 1 PATCH: at 12:30

## 2020-12-15 RX ADMIN — Medication 1 PATCH: at 12:14

## 2020-12-15 RX ADMIN — Medication 40 MILLIGRAM(S): at 05:51

## 2020-12-15 NOTE — PROGRESS NOTE ADULT - SUBJECTIVE AND OBJECTIVE BOX
CHIEF COMPLAINT:Patient is a 65y old  Female who presents with a chief complaint of Shortness of breath (14 Dec 2020 09:51)    INTERVAL HISTORY:  pt seen and examined  Breathing is improved  Labs are pending    Allergies  No Known Allergies  	  MEDICATIONS:  furosemide   Injectable 40 milliGRAM(s) IV Push daily  ALBUTerol    90 MICROgram(s) HFA Inhaler 1 Puff(s) Inhalation every 4 hours PRN  acetaminophen   Tablet .. 650 milliGRAM(s) Oral every 4 hours PRN  dextrose 40% Gel 15 Gram(s) Oral once  dextrose 50% Injectable 25 Gram(s) IV Push once  dextrose 50% Injectable 12.5 Gram(s) IV Push once  dextrose 50% Injectable 25 Gram(s) IV Push once  glucagon  Injectable 1 milliGRAM(s) IntraMuscular once  insulin lispro (ADMELOG) corrective regimen sliding scale   SubCutaneous three times a day before meals  insulin lispro (ADMELOG) corrective regimen sliding scale   SubCutaneous at bedtime  dextrose 5%. 1000 milliLiter(s) IV Continuous <Continuous>  dextrose 5%. 1000 milliLiter(s) IV Continuous <Continuous>  enoxaparin Injectable 40 milliGRAM(s) SubCutaneous daily  influenza   Vaccine 0.5 milliLiter(s) IntraMuscular once    PHYSICAL EXAM:  T(C): 36.6 (12-15-20 @ 07:15), Max: 36.9 (12-15-20 @ 03:54)  HR: 82 (12-15-20 @ 07:15) (76 - 84)  BP: 123/73 (12-15-20 @ 07:15) (123/73 - 144/87)  RR: 18 (12-15-20 @ 07:15) (18 - 20)  SpO2: 100% (12-15-20 @ 07:15) (95% - 100%)  I&O's Summary    14 Dec 2020 07:01  -  15 Dec 2020 07:00  --------------------------------------------------------  IN: 100 mL / OUT: 0 mL / NET: 100 mL    Appearance: Normal	  HEENT:   NC/AT  Eye: Pink Conjunctiva  Lungs: CDecrease air entry at bases  CVS: RRR, Normal S1 and S2, 3/6 sm lsb  Neuro: A&O x3    TELEMETRY: no events	      LABS:	 	                        11.5   7.88  )-----------( 231      ( 15 Dec 2020 08:00 )             36.1     12-14    x   |  x   |  x   ----------------------------<  x   3.9   |  x   |  x     Ca    8.7      14 Dec 2020 04:26  Mg     1.8     12-14    TPro  5.9<L>  /  Alb  3.3  /  TBili  0.5  /  DBili  x   /  AST  20  /  ALT  28  /  AlkPhos  114  12-14    proBNP:   Lipid Profile:   HgA1c:   TSH:     ASSESSMENT/PLAN:

## 2020-12-15 NOTE — PROGRESS NOTE ADULT - ASSESSMENT
pt with HFrEF and moderate MR  Plan for cath today  Procedure, risks, benefits and alternatives dw pt, agrees to proceed  Check labs  hold lasix today.

## 2020-12-15 NOTE — PROGRESS NOTE ADULT - ASSESSMENT
Pt is a 66 y/o F with hx of DM who comes to the ER for shortness of breath. In  the ED she was noted to have an elevated BNP and a cxr which showed cardiomegaly and a slight increase in the right sided pleural effusion. She was given IV Lasix and [place] cardiology was consulted. CXR with cardiomegaly and right effusion. s/p lasix in ED. COVID negative. She is being admitted for Shortness of breath/PEDERSON likely due to acute CHF.     Shortness of breath/PEDERSON  due to acute combines systolic and diastolic CHF and moderate MR.   - CXR with cardiomegaly and right effusion elevated BNP  - echo with diastolic dysfunction and ef 25-30%  - BNP 1193, troponin neg x2  - Daily weights and strict Is and Os  - Fluid restriction  - Hold Lasix  - Losartan started by cards  - Replete K >4 and Mg >2  - On  & tele monitor  - Wean O2 as tolerated  - Cardio reccs appreciated, [facility] today    Hypokalemia, resolved  - monitor K daily    Hypomagnesemia  - replaced    DM  - POCT 193, c/w ISS & hypoglycemic protocol    - On metformin at home  - A1c 7.5    Tobacco use  - Nicotine patch     Obesity (BMI 36)  - Counseled on weight loss   - Low fat diet    dvt ppx: Lovenox    Dispo: PT consult pending, likely home in 24 hours pending [facility].   Petr Dee updated,  [phone].

## 2020-12-15 NOTE — PROGRESS NOTE ADULT - SUBJECTIVE AND OBJECTIVE BOX
[facility] Division of Hospital Medicine    Chief Complaint:  Shortness of breath    SUBJECTIVE / OVERNIGHT EVENTS: Patient reports she is short of breath today. No chest pain. We discussed echo results and that she likely needs and LHC.     Patient denies chest pain, SOB, abd pain, N/V, fever, chills, dysuria or any other complaints. All remainder ROS negative.     MEDICATIONS  (STANDING):  dextrose 40% Gel 15 Gram(s) Oral once  dextrose 5%. 1000 milliLiter(s) (50 mL/Hr) IV Continuous <Continuous>  dextrose 5%. 1000 milliLiter(s) (100 mL/Hr) IV Continuous <Continuous>  dextrose 50% Injectable 25 Gram(s) IV Push once  dextrose 50% Injectable 12.5 Gram(s) IV Push once  dextrose 50% Injectable 25 Gram(s) IV Push once  enoxaparin Injectable 40 milliGRAM(s) SubCutaneous daily  glucagon  Injectable 1 milliGRAM(s) IntraMuscular once  influenza   Vaccine 0.5 milliLiter(s) IntraMuscular once  insulin lispro (ADMELOG) corrective regimen sliding scale   SubCutaneous three times a day before meals  insulin lispro (ADMELOG) corrective regimen sliding scale   SubCutaneous at bedtime  losartan 25 milliGRAM(s) Oral daily  magnesium sulfate  IVPB 2 Gram(s) IV Intermittent once  metoprolol succinate ER 25 milliGRAM(s) Oral daily  nicotine -   7 mG/24Hr(s) Patch 1 patch Transdermal daily    MEDICATIONS  (PRN):  acetaminophen   Tablet .. 650 milliGRAM(s) Oral every 4 hours PRN Mild Pain (1 - 3)  ALBUTerol    90 MICROgram(s) HFA Inhaler 1 Puff(s) Inhalation every 4 hours PRN Shortness of Breath and/or Wheezing        I&O's Summary    14 Dec 2020 07:01  -  15 Dec 2020 07:00  --------------------------------------------------------  IN: 100 mL / OUT: 0 mL / NET: 100 mL        PHYSICAL EXAM:  Vital Signs Last 24 Hrs  T(C): 36.7 (15 Dec 2020 09:23), Max: 36.9 (15 Dec 2020 03:54)  T(F): 98.1 (15 Dec 2020 09:23), Max: 98.4 (15 Dec 2020 03:54)  HR: 85 (15 Dec 2020 09:23) (76 - 85)  BP: 124/85 (15 Dec 2020 09:23) (123/73 - 144/87)  BP(mean): --  RR: 18 (15 Dec 2020 09:23) (18 - 20)  SpO2: 100% (15 Dec 2020 09:23) (95% - 100%)        CONSTITUTIONAL: NAD, well-developed, well-groomed  ENMT: Moist oral mucosa, no pharyngeal injection or exudates;  RESPIRATORY: Normal respiratory effort; lungs are clear to auscultation bilaterally  CARDIOVASCULAR: Regular rate and rhythm, normal S1 and S2, no murmur/rub/gallop; No lower extremity edema;  ABDOMEN: Nontender to palpation, normoactive bowel sounds, no rebound/guarding;   MUSCLOSKELETAL:  Normal gait; no clubbing or cyanosis of digits; no joint swelling or tenderness to palpation  PSYCH: A+O to person, place, and time; affect appropriate  NEUROLOGY: CN 2-12 are intact and symmetric; no gross sensory deficits;   SKIN: No rashes; no palpable lesions    LABS:                        11.5   7.88  )-----------( 231      ( 15 Dec 2020 08:00 )             36.1     12-15    139  |  98  |  11.0  ----------------------------<  170<H>  4.0   |  28.0  |  0.71    Ca    8.8      15 Dec 2020 08:00  Mg     1.6     12-15    TPro  5.9<L>  /  Alb  3.3  /  TBili  0.5  /  DBili  x   /  AST  20  /  ALT  28  /  AlkPhos  114  12-14              CAPILLARY BLOOD GLUCOSE      POCT Blood Glucose.: 169 mg/dL (15 Dec 2020 07:52)  POCT Blood Glucose.: 151 mg/dL (14 Dec 2020 21:09)  POCT Blood Glucose.: 180 mg/dL (14 Dec 2020 16:51)        RADIOLOGY & ADDITIONAL TESTS:  Results Reviewed:   Imaging Personally Reviewed:  Electrocardiogram Personally Reviewed:

## 2020-12-16 ENCOUNTER — TRANSCRIPTION ENCOUNTER (OUTPATIENT)
Age: 65
End: 2020-12-16

## 2020-12-16 LAB
ANION GAP SERPL CALC-SCNC: 8 MMOL/L — SIGNIFICANT CHANGE UP (ref 5–17)
BUN SERPL-MCNC: 11 MG/DL — SIGNIFICANT CHANGE UP (ref 8–20)
CALCIUM SERPL-MCNC: 9.1 MG/DL — SIGNIFICANT CHANGE UP (ref 8.6–10.2)
CHLORIDE SERPL-SCNC: 101 MMOL/L — SIGNIFICANT CHANGE UP (ref 98–107)
CO2 SERPL-SCNC: 30 MMOL/L — HIGH (ref 22–29)
CREAT SERPL-MCNC: 0.68 MG/DL — SIGNIFICANT CHANGE UP (ref 0.5–1.3)
GLUCOSE BLDC GLUCOMTR-MCNC: 126 MG/DL — HIGH (ref 70–99)
GLUCOSE BLDC GLUCOMTR-MCNC: 130 MG/DL — HIGH (ref 70–99)
GLUCOSE BLDC GLUCOMTR-MCNC: 175 MG/DL — HIGH (ref 70–99)
GLUCOSE BLDC GLUCOMTR-MCNC: 240 MG/DL — HIGH (ref 70–99)
GLUCOSE SERPL-MCNC: 152 MG/DL — HIGH (ref 70–99)
INR BLD: 1.15 RATIO — SIGNIFICANT CHANGE UP (ref 0.88–1.16)
MAGNESIUM SERPL-MCNC: 2.1 MG/DL — SIGNIFICANT CHANGE UP (ref 1.6–2.6)
POTASSIUM SERPL-MCNC: 3.5 MMOL/L — SIGNIFICANT CHANGE UP (ref 3.5–5.3)
POTASSIUM SERPL-SCNC: 3.5 MMOL/L — SIGNIFICANT CHANGE UP (ref 3.5–5.3)
PROTHROM AB SERPL-ACNC: 13.3 SEC — SIGNIFICANT CHANGE UP (ref 10.6–13.6)
SODIUM SERPL-SCNC: 139 MMOL/L — SIGNIFICANT CHANGE UP (ref 135–145)

## 2020-12-16 PROCEDURE — 99233 SBSQ HOSP IP/OBS HIGH 50: CPT

## 2020-12-16 PROCEDURE — 99232 SBSQ HOSP IP/OBS MODERATE 35: CPT

## 2020-12-16 PROCEDURE — 99152 MOD SED SAME PHYS/QHP 5/>YRS: CPT

## 2020-12-16 PROCEDURE — 93454 CORONARY ARTERY ANGIO S&I: CPT | Mod: 26

## 2020-12-16 RX ORDER — ATORVASTATIN CALCIUM 80 MG/1
80 TABLET, FILM COATED ORAL AT BEDTIME
Refills: 0 | Status: DISCONTINUED | OUTPATIENT
Start: 2020-12-16 | End: 2020-12-19

## 2020-12-16 RX ORDER — CLOPIDOGREL BISULFATE 75 MG/1
75 TABLET, FILM COATED ORAL DAILY
Refills: 0 | Status: DISCONTINUED | OUTPATIENT
Start: 2020-12-17 | End: 2020-12-19

## 2020-12-16 RX ORDER — ASPIRIN/CALCIUM CARB/MAGNESIUM 324 MG
81 TABLET ORAL DAILY
Refills: 0 | Status: DISCONTINUED | OUTPATIENT
Start: 2020-12-16 | End: 2020-12-19

## 2020-12-16 RX ORDER — CLOPIDOGREL BISULFATE 75 MG/1
600 TABLET, FILM COATED ORAL ONCE
Refills: 0 | Status: COMPLETED | OUTPATIENT
Start: 2020-12-16 | End: 2020-12-16

## 2020-12-16 RX ORDER — POTASSIUM CHLORIDE 20 MEQ
20 PACKET (EA) ORAL ONCE
Refills: 0 | Status: COMPLETED | OUTPATIENT
Start: 2020-12-16 | End: 2020-12-16

## 2020-12-16 RX ADMIN — Medication 25 MILLIGRAM(S): at 05:29

## 2020-12-16 RX ADMIN — Medication 20 MILLIEQUIVALENT(S): at 14:49

## 2020-12-16 RX ADMIN — CLOPIDOGREL BISULFATE 600 MILLIGRAM(S): 75 TABLET, FILM COATED ORAL at 14:57

## 2020-12-16 RX ADMIN — Medication 4: at 17:20

## 2020-12-16 RX ADMIN — ATORVASTATIN CALCIUM 80 MILLIGRAM(S): 80 TABLET, FILM COATED ORAL at 21:31

## 2020-12-16 RX ADMIN — LOSARTAN POTASSIUM 25 MILLIGRAM(S): 100 TABLET, FILM COATED ORAL at 05:29

## 2020-12-16 RX ADMIN — Medication 1 PATCH: at 19:55

## 2020-12-16 RX ADMIN — ENOXAPARIN SODIUM 40 MILLIGRAM(S): 100 INJECTION SUBCUTANEOUS at 21:31

## 2020-12-16 RX ADMIN — Medication 2: at 08:27

## 2020-12-16 NOTE — PROGRESS NOTE ADULT - SUBJECTIVE AND OBJECTIVE BOX
SUBJECTIVE:  Cardiology NP Pre LHC:  Pt Seen at bedside today in fasting state   HPI:  Ms Neri is a 66 yo F with hx of DM who comes to the ER for shortness of breath. She states it started about 1 month ago. She went to see her PCP and she was sent to the ER, this was back in Nov. From review of chart it seems like patient was sent home with albuterol and steroid from the ED. She reports symtoms persisted. Breathing is worse with minimal exertion.  She notes she has been sleeping with at least 2 pillows propped up. She is current everyday smoker and has been smoking for many years. She denies any cardiac medical issues,  In the ED she was noted to have an elevated BNP and a cxr which showed cardiomegaly and a slight increase in the rihgt sided pleural effusion. She was given IV lasix and [place] cardiology was consulted. She is being admitted to [facility] for further work up and evaluation. Her Covid test is negative.       ASA:2  Mallampati: 2  Bleeding Risk Score: 2.7  GFR: 	88        MEDICATIONS  (STANDING):  dextrose 40% Gel 15 Gram(s) Oral once  dextrose 5%. 1000 milliLiter(s) IV Continuous <Continuous>  dextrose 5%. 1000 milliLiter(s) IV Continuous <Continuous>  dextrose 50% Injectable 25 Gram(s) IV Push once  dextrose 50% Injectable 12.5 Gram(s) IV Push once  dextrose 50% Injectable 25 Gram(s) IV Push once  enoxaparin Injectable 40 milliGRAM(s) SubCutaneous daily  glucagon  Injectable 1 milliGRAM(s) IntraMuscular once  influenza   Vaccine 0.5 milliLiter(s) IntraMuscular once  insulin lispro (ADMELOG) corrective regimen sliding scale   SubCutaneous three times a day before meals  insulin lispro (ADMELOG) corrective regimen sliding scale   SubCutaneous at bedtime  losartan 25 milliGRAM(s) Oral daily  metoprolol succinate ER 25 milliGRAM(s) Oral daily  nicotine -   7 mG/24Hr(s) Patch 1 patch Transdermal daily  potassium chloride    Tablet ER 20 milliEquivalent(s) Oral once    MEDICATIONS  (PRN):  acetaminophen   Tablet .. 650 milliGRAM(s) Oral every 4 hours PRN Mild Pain (1 - 3)  ALBUTerol    90 MICROgram(s) HFA Inhaler 1 Puff(s) Inhalation every 4 hours PRN Shortness of Breath and/or Wheezing      PHYSICAL EXAM:  T(C): 36.7 (20 @ 09:39), Max: 36.9 (20 @ 07:57)  HR: 75 (20 @ 09:39) (67 - 80)  BP: 122/67 (20 @ 09:39) (118/65 - 135/82)  RR: 20 (20 @ 09:39) (18 - 20)  SpO2: 99% (20 @ 07:57) (95% - 99%)  Wt(kg): --    Daily Weight in k.1 (16 Dec 2020 04:08)    Appearance: Normal	  HEENT:   Normal oral mucosa,   Cardiovascular: Normal S1 S2, 3/6 systolic murmur.   Respiratory: Lungs clear though diminished in the RLL.   Skin: warm and dry  Neurologic: Non-focal  Vascular: Peripheral pulses palpable 2+ bilaterally    DIAGNOSTIC TESTING:  [ ] Echocardiogram:   Summary:   1. Left ventricular ejection fraction, by visual estimation, is 25 to 30%.   2. Severely decreased global left ventricular systolic function.   3. Spectral Doppler shows pseudonormal pattern of left ventricular myocardial filling (Grade II diastolic dysfunction). Elevated mean left atrial pressure.   4. Normal left ventricular internal cavity size.   5. There is mild concentric left ventricular hypertrophy.   6. Right ventricular size is normal Mildly reduced RV systolic function.   7. Severely enlarged left atrium.   8. The right atrium is normalin size.   9. Mild thickening of the anterior and posterior mitral valve leaflets.  10. Moderate mitral valve regurgitation.  11. Sclerotic aortic valve with normal opening.  12. Mild tricuspid regurgitation.  13. Mild to moderate pulmonic valve regurgitation.  14. There is no evidence of pericardial effusion.    [ ] Catheterization:  last Cath was  apparently had a normal EF 50% and Normal Cor .       LABS:	  CARDIAC MARKERS ( 12 Dec 2020 21:44 )  Troponin T, Serum: <0.01 ng/mL            CARDIAC MARKERS ( 12 Dec 2020 15:33 )  Troponin T, Serum: <0.01 ng/mL                                  11.5   7.88  )-----------( 231      ( 15 Dec 2020 08:00 )             36.1     12-16    139  |  101  |  11.0  ----------------------------<  152<H>  3.5   |  30.0<H>  |  0.68    Ca    9.1      16 Dec 2020 08:54  Mg     2.1     12-16      PT/INR - ( 16 Dec 2020 09:37 )   PT: 13.3 sec;   INR: 1.15 ratio                   ASSESSMENT:    Ms Neri is a 66 yo F with new Cardiomegaly, and elevated BNP and TTE revealing EF of 25%.   Plan:   LHC with Dr Corea   Further management based on cath results          SUBJECTIVE:  Cardiology NP Pre LHC:  Pt Seen at bedside today in fasting state   HPI:  Ms Neri is a 64 yo F with hx of DM who comes to the ER for shortness of breath. She states it started about 1 month ago. She went to see her PCP and she was sent to the ER, this was back in Nov. From review of chart it seems like patient was sent home with albuterol and steroid from the ED. She reports symtoms persisted. Breathing is worse with minimal exertion.  She notes she has been sleeping with at least 2 pillows propped up. She is current everyday smoker and has been smoking for many years. She denies any cardiac medical issues,  In the ED she was noted to have an elevated BNP and a cxr which showed cardiomegaly and a slight increase in the rihgt sided pleural effusion. She was given IV lasix and [place] cardiology was consulted. She is being admitted to [facility] for further work up and evaluation. Her Covid test is negative.       ASA:2  Mallampati: 2  Bleeding Risk Score: 2.7  GFR: 	88        MEDICATIONS  (STANDING):  dextrose 40% Gel 15 Gram(s) Oral once  dextrose 5%. 1000 milliLiter(s) IV Continuous <Continuous>  dextrose 5%. 1000 milliLiter(s) IV Continuous <Continuous>  dextrose 50% Injectable 25 Gram(s) IV Push once  dextrose 50% Injectable 12.5 Gram(s) IV Push once  dextrose 50% Injectable 25 Gram(s) IV Push once  enoxaparin Injectable 40 milliGRAM(s) SubCutaneous daily  glucagon  Injectable 1 milliGRAM(s) IntraMuscular once  influenza   Vaccine 0.5 milliLiter(s) IntraMuscular once  insulin lispro (ADMELOG) corrective regimen sliding scale   SubCutaneous three times a day before meals  insulin lispro (ADMELOG) corrective regimen sliding scale   SubCutaneous at bedtime  losartan 25 milliGRAM(s) Oral daily  metoprolol succinate ER 25 milliGRAM(s) Oral daily  nicotine -   7 mG/24Hr(s) Patch 1 patch Transdermal daily  potassium chloride    Tablet ER 20 milliEquivalent(s) Oral once    MEDICATIONS  (PRN):  acetaminophen   Tablet .. 650 milliGRAM(s) Oral every 4 hours PRN Mild Pain (1 - 3)  ALBUTerol    90 MICROgram(s) HFA Inhaler 1 Puff(s) Inhalation every 4 hours PRN Shortness of Breath and/or Wheezing      PHYSICAL EXAM:  T(C): 36.7 (20 @ 09:39), Max: 36.9 (20 @ 07:57)  HR: 75 (20 @ 09:39) (67 - 80)  BP: 122/67 (20 @ 09:39) (118/65 - 135/82)  RR: 20 (20 @ 09:39) (18 - 20)  SpO2: 99% (20 @ 07:57) (95% - 99%)  Wt(kg): --    Daily Weight in k.1 (16 Dec 2020 04:08)    Appearance: Normal	  HEENT:   Normal oral mucosa,   Cardiovascular: Normal S1 S2, 3/6 systolic murmur.   Respiratory: Lungs clear though diminished in the RLL.   Skin: warm and dry  Neurologic: Non-focal  Vascular: Peripheral pulses palpable 2+ bilaterally    DIAGNOSTIC TESTING:  [ ] Echocardiogram:   Summary:   1. Left ventricular ejection fraction, by visual estimation, is 25 to 30%.   2. Severely decreased global left ventricular systolic function.   3. Spectral Doppler shows pseudonormal pattern of left ventricular myocardial filling (Grade II diastolic dysfunction). Elevated mean left atrial pressure.   4. Normal left ventricular internal cavity size.   5. There is mild concentric left ventricular hypertrophy.   6. Right ventricular size is normal Mildly reduced RV systolic function.   7. Severely enlarged left atrium.   8. The right atrium is normalin size.   9. Mild thickening of the anterior and posterior mitral valve leaflets.  10. Moderate mitral valve regurgitation.  11. Sclerotic aortic valve with normal opening.  12. Mild tricuspid regurgitation.  13. Mild to moderate pulmonic valve regurgitation.  14. There is no evidence of pericardial effusion.    [ ] Catheterization:  last Cath was  apparently had a normal EF 50% and Normal Cor .       LABS:	  CARDIAC MARKERS ( 12 Dec 2020 21:44 )  Troponin T, Serum: <0.01 ng/mL            CARDIAC MARKERS ( 12 Dec 2020 15:33 )  Troponin T, Serum: <0.01 ng/mL                                  11.5   7.88  )-----------( 231      ( 15 Dec 2020 08:00 )             36.1     12-16    139  |  101  |  11.0  ----------------------------<  152<H>  3.5   |  30.0<H>  |  0.68    Ca    9.1      16 Dec 2020 08:54  Mg     2.1     12-16      PT/INR - ( 16 Dec 2020 09:37 )   PT: 13.3 sec;   INR: 1.15 ratio             ASSESSMENT:    Ms Neri is a 64 yo F with new Cardiomegaly, and elevated BNP and TTE revealing EF of 25%.   Plan:   LHC with Dr Corea   Further management based on cath results

## 2020-12-16 NOTE — DISCHARGE NOTE PROVIDER - CARE PROVIDERS DIRECT ADDRESSES
[email or web address]

## 2020-12-16 NOTE — PROGRESS NOTE ADULT - SUBJECTIVE AND OBJECTIVE BOX
[facility] Division of Hospital Medicine    Chief Complaint:  shortness of breath    SUBJECTIVE / OVERNIGHT EVENTS: patient seen and examined this morning before cath. Apparently had episode of confusion overnight. No shortness of breath, she wants to go home after the cath.     Patient denies chest pain, SOB, abd pain, N/V, fever, chills, dysuria or any other complaints. All remainder ROS negative.     MEDICATIONS  (STANDING):  aspirin  chewable 81 milliGRAM(s) Oral daily  atorvastatin 80 milliGRAM(s) Oral at bedtime  dextrose 40% Gel 15 Gram(s) Oral once  dextrose 5%. 1000 milliLiter(s) (50 mL/Hr) IV Continuous <Continuous>  dextrose 5%. 1000 milliLiter(s) (100 mL/Hr) IV Continuous <Continuous>  dextrose 50% Injectable 25 Gram(s) IV Push once  dextrose 50% Injectable 12.5 Gram(s) IV Push once  dextrose 50% Injectable 25 Gram(s) IV Push once  enoxaparin Injectable 40 milliGRAM(s) SubCutaneous daily  glucagon  Injectable 1 milliGRAM(s) IntraMuscular once  influenza   Vaccine 0.5 milliLiter(s) IntraMuscular once  insulin lispro (ADMELOG) corrective regimen sliding scale   SubCutaneous three times a day before meals  insulin lispro (ADMELOG) corrective regimen sliding scale   SubCutaneous at bedtime  losartan 25 milliGRAM(s) Oral daily  metoprolol succinate ER 25 milliGRAM(s) Oral daily  nicotine -   7 mG/24Hr(s) Patch 1 patch Transdermal daily    MEDICATIONS  (PRN):  acetaminophen   Tablet .. 650 milliGRAM(s) Oral every 4 hours PRN Mild Pain (1 - 3)  ALBUTerol    90 MICROgram(s) HFA Inhaler 1 Puff(s) Inhalation every 4 hours PRN Shortness of Breath and/or Wheezing        I&O's Summary      PHYSICAL EXAM:  Vital Signs Last 24 Hrs  T(C): 36.7 (16 Dec 2020 09:39), Max: 36.9 (16 Dec 2020 07:57)  T(F): 98.1 (16 Dec 2020 09:39), Max: 98.4 (16 Dec 2020 07:57)  HR: 91 (16 Dec 2020 14:30) (63 - 96)  BP: 171/99 (16 Dec 2020 14:30) (114/58 - 171/99)  BP(mean): --  RR: 20 (16 Dec 2020 14:30) (18 - 21)  SpO2: 100% (16 Dec 2020 14:30) (95% - 100%)      CONSTITUTIONAL: NAD, well-developed, well-groomed  ENMT: Moist oral mucosa, no pharyngeal injection or exudates;  RESPIRATORY: Normal respiratory effort; lungs are clear to auscultation bilaterally  CARDIOVASCULAR: Regular rate and rhythm, normal S1 and S2, No lower extremity edema;  ABDOMEN: Nontender to palpation, normoactive bowel sounds, no rebound/guarding;   MUSCLOSKELETAL:  Normal gait; no clubbing or cyanosis of digits; no joint swelling or tenderness to palpation  PSYCH: A+O to person, place, and time; affect appropriate  NEUROLOGY: CN 2-12 are intact and symmetric; no gross sensory deficits;   SKIN: No rashes; no palpable lesions    LABS:                        11.5   7.88  )-----------( 231      ( 15 Dec 2020 08:00 )             36.1     12-16    139  |  101  |  11.0  ----------------------------<  152<H>  3.5   |  30.0<H>  |  0.68    Ca    9.1      16 Dec 2020 08:54  Mg     2.1     12-16      PT/INR - ( 16 Dec 2020 09:37 )   PT: 13.3 sec;   INR: 1.15 ratio                   CAPILLARY BLOOD GLUCOSE      POCT Blood Glucose.: 126 mg/dL (16 Dec 2020 13:01)  POCT Blood Glucose.: 175 mg/dL (16 Dec 2020 08:08)  POCT Blood Glucose.: 186 mg/dL (15 Dec 2020 21:40)  POCT Blood Glucose.: 141 mg/dL (15 Dec 2020 16:49)        RADIOLOGY & ADDITIONAL TESTS:  Results Reviewed:   Imaging Personally Reviewed:  Electrocardiogram Personally Reviewed:

## 2020-12-16 NOTE — PROGRESS NOTE ADULT - ASSESSMENT
A/P: Ms Neri is a 66 yo F with new Cardiomegaly, and elevated BNP and TTE revealing EF of 25% now s/p LHC revealing 80% mLAD and 80% mRCA; vessels calcified via RRA.  -Remove radial band 1 to 2 hours post procedure  -bedrest while radial band in place then OOB as tolerated  -Plan for PCI/Rotational artherectomy mLAD and mRCA friday 12/18/20 with Dr. Corea  -Plavix 600mg PO load now then 75mg daily  -Maintain Beta blocker and ARB  -Add high dose statin and baby ASA daily  -Discussed with Dr. Corea  -Additional plan as per Attending MD

## 2020-12-16 NOTE — PROGRESS NOTE ADULT - ASSESSMENT
Pt is a 64 y/o F with hx of DM who comes to the ER for shortness of breath. In  the ED she was noted to have an elevated BNP and a cxr which showed cardiomegaly and a slight increase in the right sided pleural effusion. She was given IV Lasix and [place] [facility]ology was consulted. CXR with cardiomegaly and right effusion. s/p lasix in ED. COVID negative. She is being admitted for Shortness of breath/PEDERSON likely due to acute CHF.     Shortness of breath/PEDERSON  due to acute combined systolic and diastolic CHF and moderate MR.   Ischemic cardiomyopathy  - CXR with cardiomegaly and right effusion elevated BNP  - echo with diastolic dysfunction and ef 25-30%  - BNP 1193, troponin neg x2   - 12/16 s/p LHC revealing 80% mLAD and 80% mRCA; vessels calcified via RRA.  - Daily weights and strict Is and Os  - Fluid restriction  - Hold Lasix  - Losartan and metoprolol started by cards  - Replete K >4 and Mg >2  - On  & tele monitor  - Wean O2 as tolerated  - Cardio reccs appreciated, LHC today, Plan for PCI/Rotational artherectomy mLAD and mRCA friday 12/18/20 with Dr. Corea  - asa and plavix   - atorvastatin 80mg    Hypokalemia, resolved  - monitor K daily    Hypomagnesemia  - replaced    DM  - POCT 193, c/w ISS & hypoglycemic protocol    - On metformin at home  - A1c 7.5    Tobacco use  - Nicotine patch     Obesity (BMI 36)  - Counseled on weight loss   - Low fat diet    dvt ppx: Lovenox    Dispo: likely later this week post cath  Son Luiz updated,  [phone].

## 2020-12-16 NOTE — DISCHARGE NOTE PROVIDER - PROVIDER TOKENS
PROVIDER:[TOKEN:[[id number]]] PROVIDER:[TOKEN:[[id number]],FOLLOWUP:[1 week]],FREE:[LAST:[primary care],PHONE:[(   )    -],FAX:[(   )    -],FOLLOWUP:[1 week]]

## 2020-12-16 NOTE — DISCHARGE NOTE PROVIDER - NSDCFUADDAPPT_GEN_ALL_CORE_FT
Follow up with Dr. Joy in one to two weeks    Restricted use with no heavy lifting of affected arm for 48 hours.  No submerging the arm in water for 48 hours.  You may start showering today.  Call your doctor for any bleeding, swelling, loss of sensation in the hand or fingers, or fingers turning blue.  If heavy bleeding or large lumps form, hold pressure at the spot and come to the Emergency Room.

## 2020-12-16 NOTE — CHART NOTE - NSCHARTNOTEFT_GEN_A_CORE
Spoke to Son Rona by phone  He had questions regarding moms cath  Discussed procedure done today and planned procedure for Friday  All questions answered

## 2020-12-16 NOTE — DISCHARGE NOTE PROVIDER - CARE PROVIDER_API CALL
Andrae Joy  CARDIOVASCULAR DISEASE  [address] [facility], [place] [address]  Phone: [phone]  Fax: [phone]  Follow Up Time:    Andrae Joy  CARDIOVASCULAR DISEASE  [address] [facility], [place] [address]  Phone: [phone]  Fax: [phone]  Follow Up Time: 1 week    primary care,   Phone: (   )    -  Fax: (   )    -  Follow Up Time: 1 week

## 2020-12-16 NOTE — DISCHARGE NOTE PROVIDER - NSDCCPCAREPLAN_GEN_ALL_CORE_FT
PRINCIPAL DISCHARGE DIAGNOSIS  Diagnosis: CHF (congestive heart failure)  Assessment and Plan of Treatment: Check weight daily if weight increasing by 2 pounds or more call you cardiologist  Take lasix, metoprolol and Losartan  Fluid restrict and low salt diet      SECONDARY DISCHARGE DIAGNOSES  Diagnosis: CAD, multiple vessel  Assessment and Plan of Treatment: Take aspirin and palvix daily  Take statin  Follow up with cardiology    Diagnosis: Type 2 diabetes mellitus  Assessment and Plan of Treatment: resume metformin    Diagnosis: Hyperlipidemia  Assessment and Plan of Treatment:     Diagnosis: HTN (hypertension)  Assessment and Plan of Treatment:

## 2020-12-16 NOTE — PROGRESS NOTE ADULT - SUBJECTIVE AND OBJECTIVE BOX
Cardiology NP post procedure note:    -s/p LHC via RRA by Dr. Corea: 80% mLAD and 80% mRCA; vessels calcified    TELE: NSR    MEDICATIONS  (STANDING):  atorvastatin 80 milliGRAM(s) Oral at bedtime  dextrose 40% Gel 15 Gram(s) Oral once  dextrose 5%. 1000 milliLiter(s) (50 mL/Hr) IV Continuous <Continuous>  dextrose 5%. 1000 milliLiter(s) (100 mL/Hr) IV Continuous <Continuous>  dextrose 50% Injectable 25 Gram(s) IV Push once  dextrose 50% Injectable 12.5 Gram(s) IV Push once  dextrose 50% Injectable 25 Gram(s) IV Push once  enoxaparin Injectable 40 milliGRAM(s) SubCutaneous daily  glucagon  Injectable 1 milliGRAM(s) IntraMuscular once  influenza   Vaccine 0.5 milliLiter(s) IntraMuscular once  insulin lispro (ADMELOG) corrective regimen sliding scale   SubCutaneous three times a day before meals  insulin lispro (ADMELOG) corrective regimen sliding scale   SubCutaneous at bedtime  losartan 25 milliGRAM(s) Oral daily  metoprolol succinate ER 25 milliGRAM(s) Oral daily  nicotine -   7 mG/24Hr(s) Patch 1 patch Transdermal daily    MEDICATIONS  (PRN):  acetaminophen   Tablet .. 650 milliGRAM(s) Oral every 4 hours PRN Mild Pain (1 - 3)  ALBUTerol    90 MICROgram(s) HFA Inhaler 1 Puff(s) Inhalation every 4 hours PRN Shortness of Breath and/or Wheezing      Allergies:  No Known Allergies      PAST MEDICAL & SURGICAL HISTORY:  Endometrial hyperplasia    Osteoarthritis    BEATRICE (obstructive sleep apnea)    Morbid obesity with BMI of 40.0-44.9, adult    Insomnia    Anxiety    Depression    Type 2 diabetes mellitus    Hyperlipidemia    HTN (hypertension)    S/P shoulder surgery  right 1985    S/P  section  x 1        Vital Signs Last 24 Hrs  T(C): 36.7 (16 Dec 2020 09:39), Max: 36.9 (16 Dec 2020 07:57)  T(F): 98.1 (16 Dec 2020 09:39), Max: 98.4 (16 Dec 2020 07:57)  HR: 91 (16 Dec 2020 14:30) (63 - 96)  BP: 171/99 (16 Dec 2020 14:30) (114/58 - 171/99)  BP(mean): --  RR: 20 (16 Dec 2020 14:30) (18 - 21)  SpO2: 100% (16 Dec 2020 14:30) (95% - 100%)    Physical Exam:  Constitutional: NAD, AAOx3  Cardiovascular: +S1S2 RRR  Pulmonary: CTA b/l, unlabored  GI: soft NTND +BS  Extremities: no pedal edema, +distal pulses b/l  Neuro: non focal, GONZALEZ x4  Procedure site: Right radial band in place; site benign without hematoma/bleeding; RUE warm/mobile/acyanotic; + right ulnar pulse    LABS:                        11.5   7.88  )-----------( 231      ( 15 Dec 2020 08:00 )             36.1     12-16    139  |  101  |  11.0  ----------------------------<  152<H>  3.5   |  30.0<H>  |  0.68    Ca    9.1      16 Dec 2020 08:54  Mg     2.1     12-16      PT/INR - ( 16 Dec 2020 09:37 )   PT: 13.3 sec;   INR: 1.15 ratio

## 2020-12-16 NOTE — DISCHARGE NOTE PROVIDER - NSDCMRMEDTOKEN_GEN_ALL_CORE_FT
albuterol 90 mcg/inh inhalation aerosol: 2 puff(s) inhaled 4 times a day   metFORMIN 500 mg oral tablet: 1 tab(s) orally 2 times a day   spacer: 1 dose(s) buccal 4 times a day    albuterol 90 mcg/inh inhalation aerosol: 1 puff(s) inhaled every 4 hours, As needed, Shortness of Breath and/or Wheezing  aspirin 81 mg oral tablet, chewable: 1 tab(s) orally once a day  atorvastatin 80 mg oral tablet: 1 tab(s) orally once a day (at bedtime)  clopidogrel 75 mg oral tablet: 1 tab(s) orally once a day  Lasix 40 mg oral tablet: 1 tab(s) orally once a day   metFORMIN 500 mg oral tablet: 1 tab(s) orally 2 times a day   metoprolol succinate 25 mg oral tablet, extended release: 1 tab(s) orally once a day  nicotine 7 mg/24 hr transdermal film, extended release: 1 patch transdermal once a day   polyethylene glycol 3350 oral powder for reconstitution: 17 gram(s) orally once a day  sacubitril-valsartan 24 mg-26 mg oral tablet: 1 tab(s) orally 2 times a day

## 2020-12-17 LAB
ALBUMIN SERPL ELPH-MCNC: 3.4 G/DL — SIGNIFICANT CHANGE UP (ref 3.3–5.2)
ALP SERPL-CCNC: 115 U/L — SIGNIFICANT CHANGE UP (ref 40–120)
ALT FLD-CCNC: 25 U/L — SIGNIFICANT CHANGE UP
ANION GAP SERPL CALC-SCNC: 11 MMOL/L — SIGNIFICANT CHANGE UP (ref 5–17)
AST SERPL-CCNC: 23 U/L — SIGNIFICANT CHANGE UP
BILIRUB SERPL-MCNC: 0.8 MG/DL — SIGNIFICANT CHANGE UP (ref 0.4–2)
BUN SERPL-MCNC: 12 MG/DL — SIGNIFICANT CHANGE UP (ref 8–20)
CALCIUM SERPL-MCNC: 9.4 MG/DL — SIGNIFICANT CHANGE UP (ref 8.6–10.2)
CHLORIDE SERPL-SCNC: 103 MMOL/L — SIGNIFICANT CHANGE UP (ref 98–107)
CO2 SERPL-SCNC: 27 MMOL/L — SIGNIFICANT CHANGE UP (ref 22–29)
CREAT SERPL-MCNC: 0.75 MG/DL — SIGNIFICANT CHANGE UP (ref 0.5–1.3)
GLUCOSE BLDC GLUCOMTR-MCNC: 151 MG/DL — HIGH (ref 70–99)
GLUCOSE BLDC GLUCOMTR-MCNC: 154 MG/DL — HIGH (ref 70–99)
GLUCOSE BLDC GLUCOMTR-MCNC: 157 MG/DL — HIGH (ref 70–99)
GLUCOSE BLDC GLUCOMTR-MCNC: 172 MG/DL — HIGH (ref 70–99)
GLUCOSE SERPL-MCNC: 167 MG/DL — HIGH (ref 70–99)
HCT VFR BLD CALC: 34.7 % — SIGNIFICANT CHANGE UP (ref 34.5–45)
HGB BLD-MCNC: 11.1 G/DL — LOW (ref 11.5–15.5)
MAGNESIUM SERPL-MCNC: 2 MG/DL — SIGNIFICANT CHANGE UP (ref 1.8–2.6)
MCHC RBC-ENTMCNC: 23.8 PG — LOW (ref 27–34)
MCHC RBC-ENTMCNC: 32 GM/DL — SIGNIFICANT CHANGE UP (ref 32–36)
MCV RBC AUTO: 74.5 FL — LOW (ref 80–100)
PLATELET # BLD AUTO: 255 K/UL — SIGNIFICANT CHANGE UP (ref 150–400)
POTASSIUM SERPL-MCNC: 3.4 MMOL/L — LOW (ref 3.5–5.3)
POTASSIUM SERPL-SCNC: 3.4 MMOL/L — LOW (ref 3.5–5.3)
PROT SERPL-MCNC: 5.9 G/DL — LOW (ref 6.6–8.7)
RBC # BLD: 4.66 M/UL — SIGNIFICANT CHANGE UP (ref 3.8–5.2)
RBC # FLD: 15.2 % — HIGH (ref 10.3–14.5)
SODIUM SERPL-SCNC: 141 MMOL/L — SIGNIFICANT CHANGE UP (ref 135–145)
WBC # BLD: 8.55 K/UL — SIGNIFICANT CHANGE UP (ref 3.8–10.5)
WBC # FLD AUTO: 8.55 K/UL — SIGNIFICANT CHANGE UP (ref 3.8–10.5)

## 2020-12-17 PROCEDURE — 99232 SBSQ HOSP IP/OBS MODERATE 35: CPT

## 2020-12-17 RX ORDER — POTASSIUM CHLORIDE 20 MEQ
40 PACKET (EA) ORAL EVERY 4 HOURS
Refills: 0 | Status: COMPLETED | OUTPATIENT
Start: 2020-12-17 | End: 2020-12-17

## 2020-12-17 RX ORDER — FUROSEMIDE 40 MG
40 TABLET ORAL DAILY
Refills: 0 | Status: DISCONTINUED | OUTPATIENT
Start: 2020-12-17 | End: 2020-12-18

## 2020-12-17 RX ORDER — POLYETHYLENE GLYCOL 3350 17 G/17G
17 POWDER, FOR SOLUTION ORAL DAILY
Refills: 0 | Status: DISCONTINUED | OUTPATIENT
Start: 2020-12-17 | End: 2020-12-19

## 2020-12-17 RX ADMIN — ENOXAPARIN SODIUM 40 MILLIGRAM(S): 100 INJECTION SUBCUTANEOUS at 20:39

## 2020-12-17 RX ADMIN — Medication 1 PATCH: at 19:31

## 2020-12-17 RX ADMIN — ATORVASTATIN CALCIUM 80 MILLIGRAM(S): 80 TABLET, FILM COATED ORAL at 20:39

## 2020-12-17 RX ADMIN — CLOPIDOGREL BISULFATE 75 MILLIGRAM(S): 75 TABLET, FILM COATED ORAL at 09:24

## 2020-12-17 RX ADMIN — Medication 1 PATCH: at 09:24

## 2020-12-17 RX ADMIN — Medication 40 MILLIEQUIVALENT(S): at 12:25

## 2020-12-17 RX ADMIN — Medication 81 MILLIGRAM(S): at 09:24

## 2020-12-17 RX ADMIN — Medication 40 MILLIEQUIVALENT(S): at 17:54

## 2020-12-17 RX ADMIN — Medication 40 MILLIGRAM(S): at 12:25

## 2020-12-17 RX ADMIN — Medication 1 PATCH: at 00:56

## 2020-12-17 RX ADMIN — Medication 25 MILLIGRAM(S): at 05:05

## 2020-12-17 RX ADMIN — LOSARTAN POTASSIUM 25 MILLIGRAM(S): 100 TABLET, FILM COATED ORAL at 05:05

## 2020-12-17 RX ADMIN — POLYETHYLENE GLYCOL 3350 17 GRAM(S): 17 POWDER, FOR SOLUTION ORAL at 12:25

## 2020-12-17 NOTE — PROGRESS NOTE ADULT - ASSESSMENT
Pt is a 64 y/o F with hx of DM who comes to the ER for shortness of breath. In  the ED she was noted to have an elevated BNP and a cxr which showed cardiomegaly and a slight increase in the right sided pleural effusion. She was given IV Lasix and [facility] was consulted. CXR with cardiomegaly and right effusion. s/p lasix in ED. COVID negative. She is being admitted for Shortness of breath/PEDERSON likely due to acute CHF.     Shortness of breath/PEDERSON  due to acute combined systolic and diastolic CHF and moderate MR.   Ischemic cardiomyopathy  - CXR with cardiomegaly and right effusion elevated BNP  - echo with diastolic dysfunction and ef 25-30%  - BNP 1193, troponin neg x2   - 12/16 s/p LHC revealing 80% mLAD and 80% mRCA; vessels calcified via RRA.  - Daily weights and strict Is and Os  - Fluid restriction  - Start PO Lasix  - Losartan and metoprolol started by cards  - Replete K >4 and Mg >2  - On  & tele monitor  - Wean O2 as tolerated  - Cardio reccs appreciated, s/p LHC  on 12/16 which showed  80% mLAD and 80% mRCA; vessels calcified. Loaded with Plavix. plan for PCI/Rotational artherectomy mLAD and mRCA friday 12/18/20 with Dr. Corea  - asa and plavix   - atorvastatin 80mg    Hypokalemia  - po KCl  - monitor K daily    Hypomagnesemia  - replaced    DM  - POCT 193, c/w ISS & hypoglycemic protocol    - On metformin at home  - A1c 7.5    Tobacco use  - Nicotine patch     Obesity (BMI 36)  - Counseled on weight loss   - Low fat diet    Constipation  - Miralax    dvt ppx: Lovenox    Dispo: likely later this week post cath  Son Luiz updated,  [phone].

## 2020-12-17 NOTE — PROGRESS NOTE ADULT - SUBJECTIVE AND OBJECTIVE BOX
SUBJECTIVE:  Cardiology NP F/U note:  SP: LHC which revealed:   -s/p LHC via RRA by Dr. Corea: 80% mLAD and 80% mRCA; vessels calcified  denies complaints of chest pain//dizziness/palps  + slight SOB this am  carol diet / OOB   	  MEDICATIONS  (STANDING):  aspirin  chewable 81 milliGRAM(s) Oral daily  atorvastatin 80 milliGRAM(s) Oral at bedtime  clopidogrel Tablet 75 milliGRAM(s) Oral daily  dextrose 40% Gel 15 Gram(s) Oral once  dextrose 5%. 1000 milliLiter(s) IV Continuous <Continuous>  dextrose 5%. 1000 milliLiter(s) IV Continuous <Continuous>  dextrose 50% Injectable 25 Gram(s) IV Push once  dextrose 50% Injectable 12.5 Gram(s) IV Push once  dextrose 50% Injectable 25 Gram(s) IV Push once  enoxaparin Injectable 40 milliGRAM(s) SubCutaneous daily  glucagon  Injectable 1 milliGRAM(s) IntraMuscular once  influenza   Vaccine 0.5 milliLiter(s) IntraMuscular once  insulin lispro (ADMELOG) corrective regimen sliding scale   SubCutaneous three times a day before meals  insulin lispro (ADMELOG) corrective regimen sliding scale   SubCutaneous at bedtime  losartan 25 milliGRAM(s) Oral daily  metoprolol succinate ER 25 milliGRAM(s) Oral daily  nicotine -   7 mG/24Hr(s) Patch 1 patch Transdermal daily    MEDICATIONS  (PRN):  acetaminophen   Tablet .. 650 milliGRAM(s) Oral every 4 hours PRN Mild Pain (1 - 3)  ALBUTerol    90 MICROgram(s) HFA Inhaler 1 Puff(s) Inhalation every 4 hours PRN Shortness of Breath and/or Wheezing      PHYSICAL EXAM:    T(C): 36.8 (20 @ 23:24), Max: 36.8 (20 @ 23:24)  HR: 73 (20 @ 05:04) (63 - 96)  BP: 132/77 (20 @ 05:04) (114/58 - 172/93)  RR: 19 (20 @ 23:24) (18 - 21)  SpO2: 93% (20 @ 23:24) (93% - 100%)  Wt(kg): --    I&O's Summary      Daily     Daily Weight in k.9 (17 Dec 2020 04:35)    Appearance: NAD	  Cardiovascular: Normal S1 S2,RRR 64  No JVD, No murmurs, No edema  Respiratory: Lungs clear to auscultation	 no wheezes  Psychiatry: A & O x 3, Mood & affect appropriate  Gastrointestinal:  Soft, Non-tender, + BS	  Skin: warm and dry  Neurologic: Non-focal  Extremities: Normal range of motion,:  Right Radial no hematoma / good pulse / dressing removed  Vascular: Peripheral pulses palpable 2+ bilaterally    TELEMETRY: 	RSR 64 no events on tele    RADIOLOGY:   DIAGNOSTIC TESTING:  [ ] Echocardiogram:    < from: TTE Echo Complete w/ Contrast w/ Doppler (20 @ 14:55) >   Left ventricular ejection fraction, by visual estimation, is 25 to 30%.   2. Severely decreased global left ventricular systolic function.   3. Spectral Doppler shows pseudonormal pattern of left ventricular myocardial filling (Grade II diastolic dysfunction). Elevated mean left atrial pressure.   4. Normal left ventricular internal cavity size.   5. There is mild concentric left ventricular hypertrophy.   6. Right ventricular size is normal Mildly reduced RV systolic function.   7. Severely enlarged left atrium.   8. The right atrium is normalin size.   9. Mild thickening of the anterior and posterior mitral valve leaflets.  10. Moderate mitral valve regurgitation.  11. Sclerotic aortic valve with normal opening.  12. Mild tricuspid regurgitation.  13. Mild to moderate pulmonic valve regurgitation.  14. There is no evidence of pericardial effusion.    [ ]  Catheterization:    < from: Cardiac Cath Lab - Adult (20 @ 13:22) >  VENTRICLES: No LV gram was performed; however, a recent echocardiogram  demonstrated an EF of 25 %.  CORONARY VESSELS: The coronary circulation is right dominant.  LM:   --  LM: Normal.  LAD:   --  Mid LAD: There was a 80 % stenosis. The lesion was moderately  calcified. In a second lesion, there was a 50 % stenosis.  CX:   --  Circumflex: Normal.  RCA:   --  Mid RCA: There was a 70 % stenosis. In a second lesion, there  was a 50 % stenosis.  COMPLICATIONS: No complications occurred during the cath lab visit.  DIAGNOSTIC IMPRESSIONS: Severe two vessel disease. Heavily calcified.  Depressed LV function by TTE.  DIAGNOSTIC RECOMMENDATIONS: CHF management.  Using femoral approach, plan for rotational atherectomy and PCI of LAD and  RCA on 2020.  600 mg po plavix.  	                                  11.1   8.55  )-----------( 255      ( 17 Dec 2020 07:31 )             34.7         141  |  103  |  12.0  ----------------------------<  167<H>  3.4<L>   |  27.0  |  0.75    Ca    9.4      17 Dec 2020 07:31  Mg     2.0         TPro  5.9<L>  /  Alb  3.4  /  TBili  0.8  /  DBili  x   /  AST  23  /  ALT  25  /  AlkPhos  115        ASSESSMENT:   66 yo F with hx of DM who comes to the ER for shortness of breath. She states it started about 1 month ago. Breathing is worse with minimal exertion. She reports a cough productive of clear sputum. Denies leg swelling and fever. She notes she sleeps with at least 2 pillows propped up. She is current everyday smoker and has been smoking for many years. She denies any cardiac medical issues,  -CAD with rEF/ Systolic HF  -SP LHC : with 2 V CAD  RCA / LAD   -Hemodynamically stable overnight wihout CP/ Arthythmias. labs reviewed , VSS,  , still with slight SOB at rest.   -Hypokalemia  Plan:  Continue current meds and monitoring  ASA. Plavix/ BB/ statin/ARB   replete potassium   resume  Lasix, change to PO daily  to return to CCL 20  for ROTO, and PCI to the RCA and LAD  med compliance, smoking cessation, radial care and follow up discussed  SUBJECTIVE:  Cardiology NP F/U note:  SP: LHC which revealed:   -s/p LHC via RRA by Dr. Corea: 80% mLAD and 80% mRCA; vessels calcified  denies complaints of chest pain//dizziness/palps  + slight SOB this am  carol diet / OOB   	  MEDICATIONS  (STANDING):  aspirin  chewable 81 milliGRAM(s) Oral daily  atorvastatin 80 milliGRAM(s) Oral at bedtime  clopidogrel Tablet 75 milliGRAM(s) Oral daily  dextrose 40% Gel 15 Gram(s) Oral once  dextrose 5%. 1000 milliLiter(s) IV Continuous <Continuous>  dextrose 5%. 1000 milliLiter(s) IV Continuous <Continuous>  dextrose 50% Injectable 25 Gram(s) IV Push once  dextrose 50% Injectable 12.5 Gram(s) IV Push once  dextrose 50% Injectable 25 Gram(s) IV Push once  enoxaparin Injectable 40 milliGRAM(s) SubCutaneous daily  glucagon  Injectable 1 milliGRAM(s) IntraMuscular once  influenza   Vaccine 0.5 milliLiter(s) IntraMuscular once  insulin lispro (ADMELOG) corrective regimen sliding scale   SubCutaneous three times a day before meals  insulin lispro (ADMELOG) corrective regimen sliding scale   SubCutaneous at bedtime  losartan 25 milliGRAM(s) Oral daily  metoprolol succinate ER 25 milliGRAM(s) Oral daily  nicotine -   7 mG/24Hr(s) Patch 1 patch Transdermal daily    MEDICATIONS  (PRN):  acetaminophen   Tablet .. 650 milliGRAM(s) Oral every 4 hours PRN Mild Pain (1 - 3)  ALBUTerol    90 MICROgram(s) HFA Inhaler 1 Puff(s) Inhalation every 4 hours PRN Shortness of Breath and/or Wheezing      PHYSICAL EXAM:    T(C): 36.8 (20 @ 23:24), Max: 36.8 (20 @ 23:24)  HR: 73 (20 @ 05:04) (63 - 96)  BP: 132/77 (20 @ 05:04) (114/58 - 172/93)  RR: 19 (20 @ 23:24) (18 - 21)  SpO2: 93% (20 @ 23:24) (93% - 100%)  Wt(kg): --    I&O's Summary      Daily     Daily Weight in k.9 (17 Dec 2020 04:35)    Appearance: NAD	  Cardiovascular: Normal S1 S2,RRR 64  No JVD, No murmurs, No edema  Respiratory: Lungs clear to auscultation	 no wheezes  Psychiatry: A & O x 3, Mood & affect appropriate  Gastrointestinal:  Soft, Non-tender, + BS	  Skin: warm and dry  Neurologic: Non-focal  Extremities: Normal range of motion,:  Right Radial no hematoma / good pulse / dressing removed  Vascular: Peripheral pulses palpable 2+ bilaterally    TELEMETRY: 	RSR 64 no events on tele    RADIOLOGY:   DIAGNOSTIC TESTING:  [ ] Echocardiogram:    < from: TTE Echo Complete w/ Contrast w/ Doppler (20 @ 14:55) >   Left ventricular ejection fraction, by visual estimation, is 25 to 30%.   2. Severely decreased global left ventricular systolic function.   3. Spectral Doppler shows pseudonormal pattern of left ventricular myocardial filling (Grade II diastolic dysfunction). Elevated mean left atrial pressure.   4. Normal left ventricular internal cavity size.   5. There is mild concentric left ventricular hypertrophy.   6. Right ventricular size is normal Mildly reduced RV systolic function.   7. Severely enlarged left atrium.   8. The right atrium is normalin size.   9. Mild thickening of the anterior and posterior mitral valve leaflets.  10. Moderate mitral valve regurgitation.  11. Sclerotic aortic valve with normal opening.  12. Mild tricuspid regurgitation.  13. Mild to moderate pulmonic valve regurgitation.  14. There is no evidence of pericardial effusion.    [ ]  Catheterization:    < from: Cardiac Cath Lab - Adult (20 @ 13:22) >  VENTRICLES: No LV gram was performed; however, a recent echocardiogram  demonstrated an EF of 25 %.  CORONARY VESSELS: The coronary circulation is right dominant.  LM:   --  LM: Normal.  LAD:   --  Mid LAD: There was a 80 % stenosis. The lesion was moderately  calcified. In a second lesion, there was a 50 % stenosis.  CX:   --  Circumflex: Normal.  RCA:   --  Mid RCA: There was a 70 % stenosis. In a second lesion, there  was a 50 % stenosis.  COMPLICATIONS: No complications occurred during the cath lab visit.  DIAGNOSTIC IMPRESSIONS: Severe two vessel disease. Heavily calcified.  Depressed LV function by TTE.  DIAGNOSTIC RECOMMENDATIONS: CHF management.  Using femoral approach, plan for rotational atherectomy and PCI of LAD and  RCA on 2020.  600 mg po plavix.  	                         11.1   8.55  )-----------( 255      ( 17 Dec 2020 07:31 )             34.7         141  |  103  |  12.0  ----------------------------<  167<H>  3.4<L>   |  27.0  |  0.75    Ca    9.4      17 Dec 2020 07:31  Mg     2.0         TPro  5.9<L>  /  Alb  3.4  /  TBili  0.8  /  DBili  x   /  AST  23  /  ALT  25  /  AlkPhos  115        ASSESSMENT:   64 yo F with hx of DM who comes to the ER for shortness of breath. She states it started about 1 month ago. Breathing is worse with minimal exertion. She reports a cough productive of clear sputum. Denies leg swelling and fever. She notes she sleeps with at least 2 pillows propped up. She is current everyday smoker and has been smoking for many years. She denies any cardiac medical issues,  -CAD with rEF/ Systolic HF  -SP LHC : with 2 V CAD  RCA / LAD   -Hemodynamically stable overnight wihout CP/ Arthythmias. labs reviewed , VSS,  , still with slight SOB at rest.   -Hypokalemia  Plan:  Continue current meds and monitoring  ASA. Plavix/ BB/ statin/ARB   replete potassium   resume  Lasix, change to PO daily  to return to CCL 20  for ROTO, and PCI to the RCA and LAD  med compliance, smoking cessation, radial care and follow up discussed

## 2020-12-17 NOTE — PROGRESS NOTE ADULT - SUBJECTIVE AND OBJECTIVE BOX
[facility] Division of Hospital Medicine    Chief Complaint:  Shortness of breath.     SUBJECTIVE / OVERNIGHT EVENTS: s/p cardiac cath yesterday afternoon. needs procedure tomorrow. Patient explained this and she is agreeable to stay. Report no BM in a few days. Still short of breath    Patient denies chest pain, SOB, abd pain, N/V, fever, chills, dysuria or any other complaints. All remainder ROS negative.     MEDICATIONS  (STANDING):  aspirin  chewable 81 milliGRAM(s) Oral daily  atorvastatin 80 milliGRAM(s) Oral at bedtime  clopidogrel Tablet 75 milliGRAM(s) Oral daily  dextrose 40% Gel 15 Gram(s) Oral once  dextrose 5%. 1000 milliLiter(s) (50 mL/Hr) IV Continuous <Continuous>  dextrose 5%. 1000 milliLiter(s) (100 mL/Hr) IV Continuous <Continuous>  dextrose 50% Injectable 25 Gram(s) IV Push once  dextrose 50% Injectable 12.5 Gram(s) IV Push once  dextrose 50% Injectable 25 Gram(s) IV Push once  enoxaparin Injectable 40 milliGRAM(s) SubCutaneous daily  furosemide    Tablet 40 milliGRAM(s) Oral daily  glucagon  Injectable 1 milliGRAM(s) IntraMuscular once  influenza   Vaccine 0.5 milliLiter(s) IntraMuscular once  insulin lispro (ADMELOG) corrective regimen sliding scale   SubCutaneous three times a day before meals  insulin lispro (ADMELOG) corrective regimen sliding scale   SubCutaneous at bedtime  losartan 25 milliGRAM(s) Oral daily  metoprolol succinate ER 25 milliGRAM(s) Oral daily  nicotine -   7 mG/24Hr(s) Patch 1 patch Transdermal daily  polyethylene glycol 3350 17 Gram(s) Oral daily  potassium chloride    Tablet ER 40 milliEquivalent(s) Oral every 4 hours    MEDICATIONS  (PRN):  acetaminophen   Tablet .. 650 milliGRAM(s) Oral every 4 hours PRN Mild Pain (1 - 3)  ALBUTerol    90 MICROgram(s) HFA Inhaler 1 Puff(s) Inhalation every 4 hours PRN Shortness of Breath and/or Wheezing        I&O's Summary      PHYSICAL EXAM:  Vital Signs Last 24 Hrs  T(C): 36.8 (17 Dec 2020 09:10), Max: 36.8 (16 Dec 2020 23:24)  T(F): 98.2 (17 Dec 2020 09:10), Max: 98.2 (16 Dec 2020 23:24)  HR: 69 (17 Dec 2020 09:10) (63 - 96)  BP: 143/76 (17 Dec 2020 09:10) (114/58 - 172/93)  BP(mean): --  RR: 18 (17 Dec 2020 09:10) (18 - 21)  SpO2: 93% (17 Dec 2020 09:10) (93% - 100%)        CONSTITUTIONAL: NAD, well-developed, well-groomed  ENMT: Moist oral mucosa, no pharyngeal injection or exudates;  RESPIRATORY: Normal respiratory effort; lungs are clear to auscultation bilaterally  CARDIOVASCULAR: Regular rate and rhythm, normal S1 and S2, No lower extremity edema;  ABDOMEN: Nontender to palpation, normoactive bowel sounds, no rebound/guarding;   MUSCLOSKELETAL:  Normal gait; no clubbing or cyanosis of digits; no joint swelling or tenderness to palpation  PSYCH: A+O to person, place, and time; affect appropriate  NEUROLOGY: CN 2-12 are intact and symmetric; no gross sensory deficits;   SKIN: No rashes; no palpable lesions      LABS:                        11.1   8.55  )-----------( 255      ( 17 Dec 2020 07:31 )             34.7     12-17    141  |  103  |  12.0  ----------------------------<  167<H>  3.4<L>   |  27.0  |  0.75    Ca    9.4      17 Dec 2020 07:31  Mg     2.0     12-17    TPro  5.9<L>  /  Alb  3.4  /  TBili  0.8  /  DBili  x   /  AST  23  /  ALT  25  /  AlkPhos  115  12-17    PT/INR - ( 16 Dec 2020 09:37 )   PT: 13.3 sec;   INR: 1.15 ratio                   CAPILLARY BLOOD GLUCOSE      POCT Blood Glucose.: 151 mg/dL (17 Dec 2020 08:24)  POCT Blood Glucose.: 130 mg/dL (16 Dec 2020 21:16)  POCT Blood Glucose.: 240 mg/dL (16 Dec 2020 17:03)  POCT Blood Glucose.: 126 mg/dL (16 Dec 2020 13:01)        RADIOLOGY & ADDITIONAL TESTS:  Results Reviewed:   Imaging Personally Reviewed:  Electrocardiogram Personally Reviewed:

## 2020-12-18 ENCOUNTER — TRANSCRIPTION ENCOUNTER (OUTPATIENT)
Age: 65
End: 2020-12-18

## 2020-12-18 LAB
ABO RH CONFIRMATION: SIGNIFICANT CHANGE UP
ALBUMIN SERPL ELPH-MCNC: 3.5 G/DL — SIGNIFICANT CHANGE UP (ref 3.3–5.2)
ALP SERPL-CCNC: 110 U/L — SIGNIFICANT CHANGE UP (ref 40–120)
ALT FLD-CCNC: 22 U/L — SIGNIFICANT CHANGE UP
ANION GAP SERPL CALC-SCNC: 10 MMOL/L — SIGNIFICANT CHANGE UP (ref 5–17)
AST SERPL-CCNC: 18 U/L — SIGNIFICANT CHANGE UP
BILIRUB SERPL-MCNC: 0.7 MG/DL — SIGNIFICANT CHANGE UP (ref 0.4–2)
BLD GP AB SCN SERPL QL: SIGNIFICANT CHANGE UP
BUN SERPL-MCNC: 15 MG/DL — SIGNIFICANT CHANGE UP (ref 8–20)
CALCIUM SERPL-MCNC: 9.1 MG/DL — SIGNIFICANT CHANGE UP (ref 8.6–10.2)
CHLORIDE SERPL-SCNC: 106 MMOL/L — SIGNIFICANT CHANGE UP (ref 98–107)
CO2 SERPL-SCNC: 26 MMOL/L — SIGNIFICANT CHANGE UP (ref 22–29)
CREAT SERPL-MCNC: 0.77 MG/DL — SIGNIFICANT CHANGE UP (ref 0.5–1.3)
GLUCOSE BLDC GLUCOMTR-MCNC: 141 MG/DL — HIGH (ref 70–99)
GLUCOSE BLDC GLUCOMTR-MCNC: 148 MG/DL — HIGH (ref 70–99)
GLUCOSE BLDC GLUCOMTR-MCNC: 179 MG/DL — HIGH (ref 70–99)
GLUCOSE SERPL-MCNC: 163 MG/DL — HIGH (ref 70–99)
HCT VFR BLD CALC: 35.8 % — SIGNIFICANT CHANGE UP (ref 34.5–45)
HGB BLD-MCNC: 11.3 G/DL — LOW (ref 11.5–15.5)
MAGNESIUM SERPL-MCNC: 1.9 MG/DL — SIGNIFICANT CHANGE UP (ref 1.6–2.6)
MCHC RBC-ENTMCNC: 23.5 PG — LOW (ref 27–34)
MCHC RBC-ENTMCNC: 31.6 GM/DL — LOW (ref 32–36)
MCV RBC AUTO: 74.6 FL — LOW (ref 80–100)
PLATELET # BLD AUTO: 250 K/UL — SIGNIFICANT CHANGE UP (ref 150–400)
POTASSIUM SERPL-MCNC: 4.1 MMOL/L — SIGNIFICANT CHANGE UP (ref 3.5–5.3)
POTASSIUM SERPL-SCNC: 4.1 MMOL/L — SIGNIFICANT CHANGE UP (ref 3.5–5.3)
PROT SERPL-MCNC: 6 G/DL — LOW (ref 6.6–8.7)
RBC # BLD: 4.8 M/UL — SIGNIFICANT CHANGE UP (ref 3.8–5.2)
RBC # FLD: 15.1 % — HIGH (ref 10.3–14.5)
SODIUM SERPL-SCNC: 142 MMOL/L — SIGNIFICANT CHANGE UP (ref 135–145)
WBC # BLD: 7.4 K/UL — SIGNIFICANT CHANGE UP (ref 3.8–10.5)
WBC # FLD AUTO: 7.4 K/UL — SIGNIFICANT CHANGE UP (ref 3.8–10.5)

## 2020-12-18 PROCEDURE — 92933 PRQ TRLML C ATHRC ST ANGIOP1: CPT | Mod: LD

## 2020-12-18 PROCEDURE — 92978 ENDOLUMINL IVUS OCT C 1ST: CPT | Mod: 26,LD

## 2020-12-18 PROCEDURE — 99152 MOD SED SAME PHYS/QHP 5/>YRS: CPT

## 2020-12-18 PROCEDURE — 99232 SBSQ HOSP IP/OBS MODERATE 35: CPT

## 2020-12-18 PROCEDURE — 93010 ELECTROCARDIOGRAM REPORT: CPT

## 2020-12-18 PROCEDURE — 93452 LEFT HRT CATH W/VENTRCLGRPHY: CPT | Mod: 26,59

## 2020-12-18 RX ORDER — FUROSEMIDE 40 MG
40 TABLET ORAL DAILY
Refills: 0 | Status: DISCONTINUED | OUTPATIENT
Start: 2020-12-20 | End: 2020-12-19

## 2020-12-18 RX ORDER — FUROSEMIDE 40 MG
40 TABLET ORAL
Refills: 0 | Status: DISCONTINUED | OUTPATIENT
Start: 2020-12-18 | End: 2020-12-19

## 2020-12-18 RX ORDER — SACUBITRIL AND VALSARTAN 24; 26 MG/1; MG/1
1 TABLET, FILM COATED ORAL
Refills: 0 | Status: DISCONTINUED | OUTPATIENT
Start: 2020-12-19 | End: 2020-12-19

## 2020-12-18 RX ADMIN — Medication 40 MILLIGRAM(S): at 06:07

## 2020-12-18 RX ADMIN — Medication 2: at 17:26

## 2020-12-18 RX ADMIN — ATORVASTATIN CALCIUM 80 MILLIGRAM(S): 80 TABLET, FILM COATED ORAL at 22:19

## 2020-12-18 RX ADMIN — Medication 25 MILLIGRAM(S): at 06:07

## 2020-12-18 RX ADMIN — Medication 40 MILLIGRAM(S): at 17:25

## 2020-12-18 RX ADMIN — LOSARTAN POTASSIUM 25 MILLIGRAM(S): 100 TABLET, FILM COATED ORAL at 06:08

## 2020-12-18 RX ADMIN — POLYETHYLENE GLYCOL 3350 17 GRAM(S): 17 POWDER, FOR SOLUTION ORAL at 22:19

## 2020-12-18 RX ADMIN — ENOXAPARIN SODIUM 40 MILLIGRAM(S): 100 INJECTION SUBCUTANEOUS at 22:19

## 2020-12-18 RX ADMIN — Medication 1 PATCH: at 09:00

## 2020-12-18 RX ADMIN — Medication 81 MILLIGRAM(S): at 10:04

## 2020-12-18 RX ADMIN — CLOPIDOGREL BISULFATE 75 MILLIGRAM(S): 75 TABLET, FILM COATED ORAL at 10:04

## 2020-12-18 RX ADMIN — Medication 1 PATCH: at 11:03

## 2020-12-18 NOTE — PROGRESS NOTE ADULT - ASSESSMENT
Pt is a 64 y/o F with hx of DM who comes to the ER for shortness of breath. In  the ED she was noted to have an elevated BNP and a cxr which showed cardiomegaly and a slight increase in the right sided pleural effusion. She was given IV Lasix and [place] [facility]ology was consulted. CXR with cardiomegaly and right effusion. s/p lasix in ED. COVID negative. She is being admitted for Shortness of breath/PEDERSON likely due to acute CHF.     Shortness of breath/PEDERSON  due to acute combined systolic and diastolic CHF and moderate MR.   CAD with Ischemic cardiomyopathy  - CXR with cardiomegaly and right effusion elevated BNP  - echo with diastolic dysfunction and ef 25-30%  - BNP 1193, troponin neg x2   - 12/16 s/p LHC revealing 80% mLAD and 80% mRCA; vessels calcified via RRA.  - 1218 S/P PCI with rotational atherectomy and HERMES of the LAD. Will need PCI of RCA as an outpatient  - off oxygen  - Daily weights and strict Is and Os  - Fluid restriction  - Lasix 40 mg IV BID for 2 days then on Sunday December 20, start 40 mg oral daily.  - Losartan 25mg daily  and metoprolol 25 daily  - Replete K >4 and Mg >2  - On  & tele monitor  - asa and Plavix   - atorvastatin 80mg    Hypokalemia, resolved  - monitor K daily, while on lasix  - will likely need K supplementation on DC    Hypomagnesemia, resolved  - replaced    DM  - POCT 193, c/w ISS & hypoglycemic protocol    - On metformin at home  - A1c 7.5    Tobacco use  - Nicotine patch     Obesity (BMI 36)  - Counseled on weight loss   - Low fat diet    Constipation  - Miralax    dvt ppx: Lovenox    Dispo: Like Sunday, CM notified from home care as patient is NEW CHF.   Son Luiz updated,  [phone].

## 2020-12-18 NOTE — DISCHARGE NOTE NURSING/CASE MANAGEMENT/SOCIAL WORK - NSDCPEEMAIL_GEN_ALL_CORE
[facility] for Tobacco Control email [email or web address]

## 2020-12-18 NOTE — PROGRESS NOTE ADULT - SUBJECTIVE AND OBJECTIVE BOX
Department of Cardiology                                                                  [facility]/[place] [address] [address] [facility]                                                            Telephone: [phone]. Fax:[phone]                                                                                         CARDIOLOGY NOTE       Subjective:  65y Female who had a left heart catheterization which showed:  VENTRICLES: No LV gram was performed; however, a recent echocardiogram demonstrated an EF of 25 %.  CORONARY VESSELS: The coronary circulation is right dominant.  LM:     --  LM: Normal.  LAD:     --  Mid LAD: There was a 80 % stenosis. The lesion was moderately calcified. In a second lesion, there was a 50 % stenosis.  CX:     --  Circumflex: Normal.  RCA:     --  Mid RCA: There was a 70 % stenosis. In a second lesion, there was a 50 % stenosis.    Interval history: No chest pain, SOB, or palpitations overnight.    HPI: Ms Neri is a 64 yo F with hx of DM who comes to the ER for shortness of breath. She states it started about 1 month ago. She went to see her PCP and she was sent to the ER, this was back in Nov. From review of chart it seems like patient was sent home with albuterol and steroid from the ED. She reports symtoms persisted. Breathing is worse with minimal exertion. She reports a cough productive of clear sputum. Denies leg swelling and fever. She notes she sleeps with at least 2 pillows propped up. She is current everyday smoker and has been smoking for many years. She denies any cardiac medical issues,  In the ED she was noted to have an elevated BNP and a cxr which showed cardiomegaly and a slight increase in the rihgt sided pleural effusion. She was given IV lasix and [place] cardiology was consulted. She is being admitted to [facility] for further work up and evaluation. Her Covid test is pending.  (12 Dec 2020 18:02)    PAST MEDICAL & SURGICAL HISTORY:  Endometrial hyperplasia  Osteoarthritis  BEATRICE (obstructive sleep apnea)  Morbid obesity with BMI of 40.0-44.9, adult  Insomnia  Anxiety  Depression  Type 2 diabetes mellitus  Hyperlipidemia  HTN (hypertension)  S/P shoulder surgery: right   S/P  section: x 1    Allergies: No Known Allergies    MEDICATIONS  (STANDING):  aspirin  chewable 81 milliGRAM(s) Oral daily  atorvastatin 80 milliGRAM(s) Oral at bedtime  clopidogrel Tablet 75 milliGRAM(s) Oral daily  dextrose 40% Gel 15 Gram(s) Oral once  dextrose 5%. 1000 milliLiter(s) (50 mL/Hr) IV Continuous <Continuous>  dextrose 5%. 1000 milliLiter(s) (100 mL/Hr) IV Continuous <Continuous>  dextrose 50% Injectable 25 Gram(s) IV Push once  dextrose 50% Injectable 12.5 Gram(s) IV Push once  dextrose 50% Injectable 25 Gram(s) IV Push once  enoxaparin Injectable 40 milliGRAM(s) SubCutaneous daily  furosemide    Tablet 40 milliGRAM(s) Oral daily  glucagon  Injectable 1 milliGRAM(s) IntraMuscular once  influenza   Vaccine 0.5 milliLiter(s) IntraMuscular once  insulin lispro (ADMELOG) corrective regimen sliding scale   SubCutaneous three times a day before meals  insulin lispro (ADMELOG) corrective regimen sliding scale   SubCutaneous at bedtime  losartan 25 milliGRAM(s) Oral daily  metoprolol succinate ER 25 milliGRAM(s) Oral daily  nicotine -   7 mG/24Hr(s) Patch 1 patch Transdermal daily  polyethylene glycol 3350 17 Gram(s) Oral daily    MEDICATIONS  (PRN):  acetaminophen   Tablet .. 650 milliGRAM(s) Oral every 4 hours PRN Mild Pain (1 - 3)  ALBUTerol    90 MICROgram(s) HFA Inhaler 1 Puff(s) Inhalation every 4 hours PRN Shortness of Breath and/or Wheezing    Objective:  Vital Signs Last 24 Hrs  T(C): 36.3 (18 Dec 2020 09:45), Max: 37.1 (17 Dec 2020 20:29)  T(F): 97.3 (18 Dec 2020 09:45), Max: 98.8 (17 Dec 2020 20:29)  HR: 67 (18 Dec 2020 09:45) (67 - 100)  BP: 129/71 (18 Dec 2020 09:45) (112/72 - 159/66)  BP(mean): 97 (17 Dec 2020 12:20) (97 - 97)  RR: 16 (18 Dec 2020 09:45) (16 - 19)  SpO2: 100% (18 Dec 2020 09:45) (95% - 100%)    CM: SR  Neuro: A&OX3, CN 2-12 intact  HEENT: NC, AT  Lungs: CTA B/L  CV: S1, S2, no murmur, RRR  Abd: Soft  Right Groin: Soft, no bleeding, no hematoma  Extremity: + distal pulses  EKG:     Echo:   Left Ventricle: The left ventricular internal cavity size is normal. Global LV systolic function was severely decreased. Left ventricular ejection fraction, by visual estimation, is 25 to 30%. Spectral Doppler shows pseudonormal pattern of left ventricular myocardial filling (Grade II diastolic dysfunction). Elevated mean left atrial pressure.  Right Ventricle: The right ventricular size is normal. RV systolic function is mildly reduced.  Left Atrium: Severely enlarged left atrium.  Right Atrium: The right atrium is normal in size.  Pericardium: There is no evidence of pericardial effusion.  Mitral Valve: The mitral valve is normal in structure. Mild thickening of the anterior and posterior mitralvalve leaflets. No evidence of mitral stenosis. Moderate mitral valve regurgitation is seen. The MR jet is centrally-directed.  Tricuspid Valve: Structurally normal tricuspid valve, with normal leaflet excursion. Mild tricuspid regurgitation is visualized. Adequate TR velocity was not obtained to accurately assess RVSP.  Aortic Valve: The aortic valve is trileaflet. Sclerotic aortic valve with normal opening. No evidence of aortic valve regurgitation is seen.  Pulmonic Valve: Mild to moderate pulmonic valve regurgitation.  Aorta: The aortic root is normal in size and structure.  Venous: The inferior vena cava was normal sized, with respiratory size variation greater than 50%.                          11.3   7.40  )-----------( 250      ( 18 Dec 2020 06:22 )             35.8         142  |  106  |  15.0  ------------------------<  163  4.1   |  26.0  |  0.77    Ca    9.1      18 Dec 2020 06:22  Mg     1.9         TPro  6.0  /  Alb  3.5  /  TBili  0.7  /  DBili  x   /  AST    /  ALT  22  /  AlkPhos  110                                                                                    Department of Cardiology                                                                  [facility]/[place] [address] [address] [facility]                                                            Telephone: [phone]. Fax:[phone]                                                                                         CARDIOLOGY NOTE       Subjective:  65y Female who had a left heart catheterization which showed:  VENTRICLES: No LV gram was performed; however, a recent echocardiogram demonstrated an EF of 25 %.  CORONARY VESSELS: The coronary circulation is right dominant.  LM:     --  LM: Normal.  LAD:     --  Mid LAD: There was a 80 % stenosis. The lesion was moderately calcified. This lesion was treated with rotational atherectomy and a 3.0 X 30 [place] HERMES, and a 3.5 X 30 [place] HERMES. In a second lesion, there was a 50 % stenosis.  CX:     --  Circumflex: Normal.  RCA:     --  Mid RCA: There was a 70 % stenosis. In a second lesion, there was a 50 % stenosis.    Interval history: No chest pain, SOB, or palpitations overnight.    HPI: Ms Neri is a 64 yo F with hx of DM who comes to the ER for shortness of breath. She states it started about 1 month ago. She went to see her PCP and she was sent to the ER, this was back in Nov. From review of chart it seems like patient was sent home with albuterol and steroid from the ED. She reports symtoms persisted. Breathing is worse with minimal exertion. She reports a cough productive of clear sputum. Denies leg swelling and fever. She notes she sleeps with at least 2 pillows propped up. She is current everyday smoker and has been smoking for many years. She denies any cardiac medical issues,  In the ED she was noted to have an elevated BNP and a cxr which showed cardiomegaly and a slight increase in the rihgt sided pleural effusion. She was given IV lasix and [place] cardiology was consulted. She [place] being [place] [facility] [place] work up and evaluation. Her Covid test is pending.  (12 Dec 2020 18:02)    PAST MEDICAL & SURGICAL HISTORY:  Endometrial hyperplasia  Osteoarthritis  BEATRICE (obstructive sleep apnea)  Morbid obesity with BMI of 40.0-44.9, adult  Insomnia  Anxiety  Depression  Type 2 diabetes mellitus  Hyperlipidemia  HTN (hypertension)  S/P shoulder surgery: right 1985  S/P  section: x 1    Allergies: No Known Allergies    MEDICATIONS  (STANDING):  aspirin  chewable 81 milliGRAM(s) Oral daily  atorvastatin 80 milliGRAM(s) Oral at bedtime  clopidogrel Tablet 75 milliGRAM(s) Oral daily  dextrose 40% Gel 15 Gram(s) Oral once  dextrose 5%. 1000 milliLiter(s) (50 mL/Hr) IV Continuous <Continuous>  dextrose 5%. 1000 milliLiter(s) (100 mL/Hr) IV Continuous <Continuous>  dextrose 50% Injectable 25 Gram(s) IV Push once  dextrose 50% Injectable 12.5 Gram(s) IV Push once  dextrose 50% Injectable 25 Gram(s) IV Push once  enoxaparin Injectable 40 milliGRAM(s) SubCutaneous daily  furosemide    Tablet 40 milliGRAM(s) Oral daily  glucagon  Injectable 1 milliGRAM(s) IntraMuscular once  influenza   Vaccine 0.5 milliLiter(s) IntraMuscular once  insulin lispro (ADMELOG) corrective regimen sliding scale   SubCutaneous three times a day before meals  insulin lispro (ADMELOG) corrective regimen sliding scale   SubCutaneous at bedtime  losartan 25 milliGRAM(s) Oral daily  metoprolol succinate ER 25 milliGRAM(s) Oral daily  nicotine -   7 mG/24Hr(s) Patch 1 patch Transdermal daily  polyethylene glycol 3350 17 Gram(s) Oral daily    MEDICATIONS  (PRN):  acetaminophen   Tablet .. 650 milliGRAM(s) Oral every 4 hours PRN Mild Pain (1 - 3)  ALBUTerol    90 MICROgram(s) HFA Inhaler 1 Puff(s) Inhalation every 4 hours PRN Shortness of Breath and/or Wheezing    Objective:  Vital Signs Last 24 Hrs  T(C): 36.3 (18 Dec 2020 09:45), Max: 37.1 (17 Dec 2020 20:29)  T(F): 97.3 (18 Dec 2020 09:45), Max: 98.8 (17 Dec 2020 20:29)  HR: 67 (18 Dec 2020 09:45) (67 - 100)  BP: 129/71 (18 Dec 2020 09:45) (112/72 - 159/66)  BP(mean): 97 (17 Dec 2020 12:20) (97 - 97)  RR: 16 (18 Dec 2020 09:45) (16 - 19)  SpO2: 100% (18 Dec 2020 09:45) (95% - 100%)    CM: SR  Neuro: A&OX3, CN 2-12 intact  HEENT: NC, AT  Lungs: CTA B/L  CV: S1, S2, no murmur, RRR  Abd: Soft  Right Groin: Soft, no bleeding, no hematoma  Extremity: + distal pulses  EKG:     Echo:   Left Ventricle: The left ventricular internal cavity size is normal. Global LV systolic function was severely decreased. Left ventricular ejection fraction, by visual estimation, is 25 to 30%. Spectral Doppler shows pseudonormal pattern of left ventricular myocardial filling (Grade II diastolic dysfunction). Elevated mean left atrial pressure.  Right Ventricle: The right ventricular size is normal. RV systolic function is mildly reduced.  Left Atrium: Severely enlarged left atrium.  Right Atrium: The right atrium is normal in size.  Pericardium: There is no evidence of pericardial effusion.  Mitral Valve: The mitral valve is normal in structure. Mild thickening of the anterior and posterior mitral valve leaflets. No evidence of mitral stenosis. Moderate mitral valve regurgitation is seen. The MR jet is centrally-directed.  Tricuspid Valve: Structurally normal tricuspid valve, with normal leaflet excursion. Mild tricuspid regurgitation is visualized. Adequate TR velocity was not obtained to accurately assess RVSP.  Aortic Valve: The aortic valve is trileaflet. Sclerotic aortic valve with normal opening. No evidence of aortic valve regurgitation is seen.  Pulmonic Valve: Mild to moderate pulmonic valve regurgitation.  Aorta: The aortic root is normal in size and structure.  Venous: The inferior vena cava was normal sized, with respiratory size variation greater than 50%.                          11.3   7.40  )-----------( 250      ( 18 Dec 2020 06:22 )             35.8     12-    142  |  106  |  15.0  ------------------------<  163  4.1   |  26.0  |  0.77    Ca    9.1      18 Dec 2020 06:22  Mg     1.9         TPro  6.0  /  Alb  3.5  /  TBili  0.7  /  DBili  x   /  AST  18  /  ALT  22  /  AlkPhos  110

## 2020-12-18 NOTE — DISCHARGE NOTE NURSING/CASE MANAGEMENT/SOCIAL WORK - NSDCPEWEB_GEN_ALL_CORE
[facility] for Tobacco Control website --- [email or web address] --- [email or web address]

## 2020-12-18 NOTE — PROGRESS NOTE ADULT - SUBJECTIVE AND OBJECTIVE BOX
[facility] Division of Hospital Medicine    Chief Complaint:  Shortness of breath    SUBJECTIVE / OVERNIGHT EVENTS: Patient seen and examined. Denies shortness of breath. Had BM yesterday. Cath today    Patient denies chest pain, SOB, abd pain, N/V, fever, chills, dysuria or any other complaints. All remainder ROS negative.     MEDICATIONS  (STANDING):  aspirin  chewable 81 milliGRAM(s) Oral daily  atorvastatin 80 milliGRAM(s) Oral at bedtime  clopidogrel Tablet 75 milliGRAM(s) Oral daily  dextrose 40% Gel 15 Gram(s) Oral once  dextrose 5%. 1000 milliLiter(s) (50 mL/Hr) IV Continuous <Continuous>  dextrose 5%. 1000 milliLiter(s) (100 mL/Hr) IV Continuous <Continuous>  dextrose 50% Injectable 25 Gram(s) IV Push once  dextrose 50% Injectable 12.5 Gram(s) IV Push once  dextrose 50% Injectable 25 Gram(s) IV Push once  enoxaparin Injectable 40 milliGRAM(s) SubCutaneous daily  furosemide   Injectable 40 milliGRAM(s) IV Push two times a day  glucagon  Injectable 1 milliGRAM(s) IntraMuscular once  influenza   Vaccine 0.5 milliLiter(s) IntraMuscular once  insulin lispro (ADMELOG) corrective regimen sliding scale   SubCutaneous three times a day before meals  insulin lispro (ADMELOG) corrective regimen sliding scale   SubCutaneous at bedtime  losartan 25 milliGRAM(s) Oral daily  metoprolol succinate ER 25 milliGRAM(s) Oral daily  nicotine -   7 mG/24Hr(s) Patch 1 patch Transdermal daily  polyethylene glycol 3350 17 Gram(s) Oral daily    MEDICATIONS  (PRN):  acetaminophen   Tablet .. 650 milliGRAM(s) Oral every 4 hours PRN Mild Pain (1 - 3)  ALBUTerol    90 MICROgram(s) HFA Inhaler 1 Puff(s) Inhalation every 4 hours PRN Shortness of Breath and/or Wheezing        I&O's Summary    17 Dec 2020 07:01  -  18 Dec 2020 07:00  --------------------------------------------------------  IN: 720 mL / OUT: 0 mL / NET: 720 mL    18 Dec 2020 07:01  -  18 Dec 2020 15:56  --------------------------------------------------------  IN: 0 mL / OUT: 1550 mL / NET: -1550 mL        PHYSICAL EXAM:  Vital Signs Last 24 Hrs  T(C): 36.5 (18 Dec 2020 15:43), Max: 37.1 (17 Dec 2020 20:29)  T(F): 97.7 (18 Dec 2020 15:43), Max: 98.8 (17 Dec 2020 20:29)  HR: 71 (18 Dec 2020 15:43) (62 - 100)  BP: 125/75 (18 Dec 2020 15:43) (112/72 - 145/69)  BP(mean): --  RR: 18 (18 Dec 2020 15:43) (16 - 19)  SpO2: 99% (18 Dec 2020 15:43) (95% - 100%)      CONSTITUTIONAL: NAD, well-developed, well-groomed  ENMT: Moist oral mucosa, no pharyngeal injection or exudates;  RESPIRATORY: Normal respiratory effort; lungs are clear to auscultation bilaterally  CARDIOVASCULAR: Regular rate and rhythm, normal S1 and S2, No lower extremity edema;  ABDOMEN: Nontender to palpation, normoactive bowel sounds, no rebound/guarding;   MUSCLOSKELETAL:  Normal gait; no clubbing or cyanosis of digits; no joint swelling or tenderness to palpation  PSYCH: A+O to person, place, and time; affect appropriate  NEUROLOGY: CN 2-12 are intact and symmetric; no gross sensory deficits;   SKIN: No rashes; no palpable lesions    LABS:                        11.3   7.40  )-----------( 250      ( 18 Dec 2020 06:22 )             35.8     12-18    142  |  106  |  15.0  ----------------------------<  163<H>  4.1   |  26.0  |  0.77    Ca    9.1      18 Dec 2020 06:22  Mg     1.9     12-18    TPro  6.0<L>  /  Alb  3.5  /  TBili  0.7  /  DBili  x   /  AST  18  /  ALT  22  /  AlkPhos  110  12-18              CAPILLARY BLOOD GLUCOSE      POCT Blood Glucose.: 148 mg/dL (18 Dec 2020 08:19)  POCT Blood Glucose.: 172 mg/dL (17 Dec 2020 20:38)  POCT Blood Glucose.: 157 mg/dL (17 Dec 2020 17:04)        RADIOLOGY & ADDITIONAL TESTS:  Results Reviewed:   Imaging Personally Reviewed:  Electrocardiogram Personally Reviewed:

## 2020-12-18 NOTE — PROGRESS NOTE ADULT - ASSESSMENT
Assessment: 65y Female with diagnosis of HFrEF and CAD    Problem List:  1. CAD, S/P PCI with rotational atherectomy and HERMES of the LAD.  ·	Bedrest for 2 hours with right leg straight.  ·	Continue Plavix 75 mg daily and aspirin 81 mg daily  ·	Will need PCI of RCA as an outpatient  ·	EKG now.  ·	EKG, CBC, BMP, and magnesium in AM.  ·	Cardiac rehab info provided/referral and communication to cardiac rehab completed     2. HFrEF  ·	Lasix 40 mg IV BID for 2 days then on Sunday December 20, start 40 mg oral daily.  ·	GDMT: Toprol 25 mg daily and losartan 25 mg daily      3. DM  ·	FS Q AC and HS    4. HTN  ·	Toprol 25 mg daily  ·	Losartan 25 mg daily    5. HLD  ·	Lipitor 80 mg daily      Discharge Planning: Social work consult called for safe discharge.

## 2020-12-18 NOTE — CHART NOTE - NSCHARTNOTEFT_GEN_A_CORE
Department of Cardiology                                                                  [facility]/[place] [address] [address]                                                            Telephone: [phone]. Fax:[phone]                                                                                         PROBLEM NOTE     65yFemale with history as listed below who had a PCI of the mLAD    Problem: Called to evaluate bleeding groin.    PAST MEDICAL & SURGICAL HISTORY:  Endometrial hyperplasia  Osteoarthritis  BEATRICE (obstructive sleep apnea)  Morbid obesity with BMI of 40.0-44.9, adult  Insomnia  Anxiety  Depression  Type 2 diabetes mellitus  Hyperlipidemia  HTN (hypertension)  S/P shoulder surgery: right   S/P  section: x 1    HPI: Ms Neri is a 66 yo F with hx of DM who comes to the ER for shortness of breath. She states it started about 1 month ago. She went to see her PCP and she was sent to the ER, this was back in Nov. From review of chart it seems like patient was sent home with albuterol and steroid from the ED. She reports symtoms persisted. Breathing is worse with minimal exertion. She reports a cough productive of clear sputum. Denies leg swelling and fever. She notes she sleeps with at least 2 pillows propped up. She is current everyday smoker and has been smoking for many years. She denies any cardiac medical issues,  In the ED she was noted to have an elevated BNP and a cxr which showed cardiomegaly and a slight increase in the rihgt sided pleural effusion. She was given IV lasix and [place] cardiology was consulted. She is being admitted to [facility] for further work up and evaluation. Her Covid test is pending.  (12 Dec 2020 18:02)    ROS:  CV: No chest pain, no palpitations  Resp: No dyspnea  GI: No nausea/vomiting  Neuro: No dizziness    Objective:  T(C): 36.5 (20 @ 15:43), Max: 37.1 (-20 @ 20:29)  HR: 71 (-20 @ 15:43) (62 - 79)  BP: 125/75 (-18-20 @ 15:43) (125/75 - 145/69)  RR: 18 (18-20 @ 15:43) (16 - 19)  SpO2: 99% (18-20 @ 15:43) (97% - 100%)    General: Awake, alert, speech clear, no acute distress  Chest: S1, S2, RRR, lungs CTA B/L  Abdomen: Soft, nondistended  Right Groin: Soft, small amount bleeding, no hematoma  Pulses: + distal pulses                          11.3   7.40  )-----------( 250      ( 18 Dec 2020 06:22 )             35.8         142  |  106  |  15.0  ----------------------------<  163  4.1   |  26.0  |  0.77    Ca    9.1      18 Dec 2020 06:22  Mg     1.9         TPro  6.0  /  Alb  3.5  /  TBili  0.7  /  DBili  x   /  AST    /  ALT  22  /  AlkPhos  110        Diagnosis: CAD    Problem List:   1. Bleeding from right groin access site  Manual compression held for 10 minutes, no bleeding or hematoma noted.  Bedrest for 2 hours with right leg straight.

## 2020-12-18 NOTE — DISCHARGE NOTE NURSING/CASE MANAGEMENT/SOCIAL WORK - PATIENT PORTAL LINK FT
You can access the FollowMyHealth Patient Portal offered by [facility] by registering at the following website: [email or web address]. By joining [employer]’s FollowMyHealth portal, you will also be able to view your health information using other applications (apps) compatible with our system.

## 2020-12-18 NOTE — CHART NOTE - NSCHARTNOTEFT_GEN_A_CORE
Department of Cardiology                                                                  [facility]/[place] [address] [address] [facility]                                                            Telephone: [phone]. Fax:[phone]                                                                                   Pre Cath Note    65y Female     Planned Procedure: PCI/Roto of LAD/RCA    Pertinent Prior Medications:        Antiplatelet: Plavix 75 mg daily       Aspirin: 81 mg daily       Statin: Lipitor 80 mg daily       Beta Blocker: Toprol 25 mg daily       CCB: N/A       Other: Lasix 40 mg daily       ACEI: Losartan 25 mg daily    ASA: 3  Mallampati: 2  CCS Class: 3  GFR: 80  Adjusted Bleeding Risk: 1.4%    HPI: Ms Neri is a 64 yo F with hx of DM who comes to the ER for shortness of breath. She states it started about 1 month ago. She went to see her PCP and she was sent to the ER, this was back in Nov. From review of chart it seems like patient was sent home with albuterol and steroid from the ED. She reports symptoms persisted. Breathing is worse with minimal exertion. She reports a cough productive of clear sputum. Denies leg swelling and fever. She notes she sleeps with at least 2 pillows propped up. She is current everyday smoker and has been smoking for many years. She denies any cardiac medical issues,  In the ED she was noted to have an elevated BNP and a cxr which showed cardiomegaly and a slight increase in the rihgt sided pleural effusion. She was given IV lasix and [place] cardiology was consulted. She is being admitted to [facility] for further work up and evaluation. Her Covid test is pending.  (12 Dec 2020 18:02)    Nuclear Stress Test: N/A     Echo:   Left Ventricle: The left ventricular internal cavity size is normal. Global LV systolic function was severely decreased. Left ventricular ejection fraction, by visual estimation, is 25 to 30%. Spectral Doppler shows pseudonormal pattern of left ventricular myocardial filling (Grade II diastolic dysfunction). Elevated mean left atrial pressure.  Right Ventricle: The right ventricular size is normal. RV systolic function is mildly reduced.  Left Atrium: Severely enlarged left atrium.  Right Atrium: The right atrium is normal in size.  Pericardium: There is no evidence of pericardial effusion.  Mitral Valve: The mitral valve is normal in structure. Mild thickening of the anterior and posterior mitral valve leaflets. No evidence of mitral stenosis. Moderate mitral valve regurgitation is seen. The MR jet is centrally-directed.  Tricuspid Valve: Structurally normal tricuspid valve, with normal leaflet excursion. Mild tricuspid regurgitation is visualized. Adequate TR velocity was not obtained to accurately assess RVSP.  Aortic Valve: The aortic valve is trileaflet. Sclerotic aortic valve with normal opening. No evidence of aortic valve regurgitation is seen.  Pulmonic Valve: Mild to moderate pulmonic valve regurgitation.  Aorta: The aortic root is normal in size and structure.  Venous: The inferior vena cava was normal sized, with respiratory size variation greater than 50%.    Allergies: No Known Allergies    PAST MEDICAL & SURGICAL HISTORY:  Endometrial hyperplasia  Osteoarthritis  BEATRICE (obstructive sleep apnea)  Morbid obesity with BMI of 40.0-44.9, adult  Insomnia  Anxiety  Depression  Type 2 diabetes mellitus  Hyperlipidemia  HTN (hypertension)  S/P shoulder surgery: right 1985  S/P  section: x 1    Home Medications:     Physical Examination:   General: Awake, alert, speech clear, no acute distress.  Neck: No bruit, normal jugular venous pressures  Chest: Clear CTA, S1, S2, no murmur, RRR  Extremities: No edema                          11.3   7.40  )-----------( 250      ( 18 Dec 2020 06:22 )             35.8     12    142  |  106  |  15.0  ----------------------------<  163  4.1   |  26.0  |  0.77    Ca    9.1      18 Dec 2020 06:22  Mg     1.9         TPro  6.0  /  Alb  3.5  /  TBili  0.7  /  DBili  x   /  AST  18  /  ALT  22  /  AlkPhos  110          Assessment and Plan:   The patient was examined and interviewed by me today. There has been no change from the original history and physical except as noted above.    Problem List:   1. CAD of LAD and RCA  PCI/Roto of LAD/RCA    2. Cardiomyopathy

## 2020-12-19 VITALS — WEIGHT: 214.95 LBS

## 2020-12-19 LAB
ALBUMIN SERPL ELPH-MCNC: 3.5 G/DL — SIGNIFICANT CHANGE UP (ref 3.3–5.2)
ALP SERPL-CCNC: 105 U/L — SIGNIFICANT CHANGE UP (ref 40–120)
ALT FLD-CCNC: 21 U/L — SIGNIFICANT CHANGE UP
ANION GAP SERPL CALC-SCNC: 13 MMOL/L — SIGNIFICANT CHANGE UP (ref 5–17)
AST SERPL-CCNC: 25 U/L — SIGNIFICANT CHANGE UP
BILIRUB SERPL-MCNC: 0.7 MG/DL — SIGNIFICANT CHANGE UP (ref 0.4–2)
BUN SERPL-MCNC: 11 MG/DL — SIGNIFICANT CHANGE UP (ref 8–20)
CALCIUM SERPL-MCNC: 9.1 MG/DL — SIGNIFICANT CHANGE UP (ref 8.6–10.2)
CHLORIDE SERPL-SCNC: 98 MMOL/L — SIGNIFICANT CHANGE UP (ref 98–107)
CO2 SERPL-SCNC: 28 MMOL/L — SIGNIFICANT CHANGE UP (ref 22–29)
CREAT SERPL-MCNC: 0.77 MG/DL — SIGNIFICANT CHANGE UP (ref 0.5–1.3)
GLUCOSE BLDC GLUCOMTR-MCNC: 166 MG/DL — HIGH (ref 70–99)
GLUCOSE BLDC GLUCOMTR-MCNC: 191 MG/DL — HIGH (ref 70–99)
GLUCOSE SERPL-MCNC: 144 MG/DL — HIGH (ref 70–99)
HCT VFR BLD CALC: 36.9 % — SIGNIFICANT CHANGE UP (ref 34.5–45)
HGB BLD-MCNC: 11.5 G/DL — SIGNIFICANT CHANGE UP (ref 11.5–15.5)
MAGNESIUM SERPL-MCNC: 1.8 MG/DL — SIGNIFICANT CHANGE UP (ref 1.6–2.6)
MCHC RBC-ENTMCNC: 23.2 PG — LOW (ref 27–34)
MCHC RBC-ENTMCNC: 31.2 GM/DL — LOW (ref 32–36)
MCV RBC AUTO: 74.5 FL — LOW (ref 80–100)
PLATELET # BLD AUTO: 278 K/UL — SIGNIFICANT CHANGE UP (ref 150–400)
POTASSIUM SERPL-MCNC: 3.6 MMOL/L — SIGNIFICANT CHANGE UP (ref 3.5–5.3)
POTASSIUM SERPL-SCNC: 3.6 MMOL/L — SIGNIFICANT CHANGE UP (ref 3.5–5.3)
PROT SERPL-MCNC: 6.4 G/DL — LOW (ref 6.6–8.7)
RBC # BLD: 4.95 M/UL — SIGNIFICANT CHANGE UP (ref 3.8–5.2)
RBC # FLD: 15.5 % — HIGH (ref 10.3–14.5)
SODIUM SERPL-SCNC: 139 MMOL/L — SIGNIFICANT CHANGE UP (ref 135–145)
WBC # BLD: 8.81 K/UL — SIGNIFICANT CHANGE UP (ref 3.8–10.5)
WBC # FLD AUTO: 8.81 K/UL — SIGNIFICANT CHANGE UP (ref 3.8–10.5)

## 2020-12-19 PROCEDURE — 82728 ASSAY OF FERRITIN: CPT

## 2020-12-19 PROCEDURE — 86900 BLOOD TYPING SEROLOGIC ABO: CPT

## 2020-12-19 PROCEDURE — C1724: CPT

## 2020-12-19 PROCEDURE — C1894: CPT

## 2020-12-19 PROCEDURE — 99232 SBSQ HOSP IP/OBS MODERATE 35: CPT

## 2020-12-19 PROCEDURE — 94640 AIRWAY INHALATION TREATMENT: CPT

## 2020-12-19 PROCEDURE — 80053 COMPREHEN METABOLIC PANEL: CPT

## 2020-12-19 PROCEDURE — 85025 COMPLETE CBC W/AUTO DIFF WBC: CPT

## 2020-12-19 PROCEDURE — 99152 MOD SED SAME PHYS/QHP 5/>YRS: CPT

## 2020-12-19 PROCEDURE — 85027 COMPLETE CBC AUTOMATED: CPT

## 2020-12-19 PROCEDURE — 82962 GLUCOSE BLOOD TEST: CPT

## 2020-12-19 PROCEDURE — 84484 ASSAY OF TROPONIN QUANT: CPT

## 2020-12-19 PROCEDURE — C1753: CPT

## 2020-12-19 PROCEDURE — 93005 ELECTROCARDIOGRAM TRACING: CPT

## 2020-12-19 PROCEDURE — 99285 EMERGENCY DEPT VISIT HI MDM: CPT | Mod: 25

## 2020-12-19 PROCEDURE — 83735 ASSAY OF MAGNESIUM: CPT

## 2020-12-19 PROCEDURE — 99153 MOD SED SAME PHYS/QHP EA: CPT

## 2020-12-19 PROCEDURE — 92978 ENDOLUMINL IVUS OCT C 1ST: CPT | Mod: LD

## 2020-12-19 PROCEDURE — C1887: CPT

## 2020-12-19 PROCEDURE — 84132 ASSAY OF SERUM POTASSIUM: CPT

## 2020-12-19 PROCEDURE — 83880 ASSAY OF NATRIURETIC PEPTIDE: CPT

## 2020-12-19 PROCEDURE — 86850 RBC ANTIBODY SCREEN: CPT

## 2020-12-19 PROCEDURE — C9602: CPT | Mod: LD

## 2020-12-19 PROCEDURE — 36415 COLL VENOUS BLD VENIPUNCTURE: CPT

## 2020-12-19 PROCEDURE — U0003: CPT

## 2020-12-19 PROCEDURE — 81001 URINALYSIS AUTO W/SCOPE: CPT

## 2020-12-19 PROCEDURE — 86140 C-REACTIVE PROTEIN: CPT

## 2020-12-19 PROCEDURE — 80048 BASIC METABOLIC PNL TOTAL CA: CPT

## 2020-12-19 PROCEDURE — 83615 LACTATE (LD) (LDH) ENZYME: CPT

## 2020-12-19 PROCEDURE — 93010 ELECTROCARDIOGRAM REPORT: CPT

## 2020-12-19 PROCEDURE — 86901 BLOOD TYPING SEROLOGIC RH(D): CPT

## 2020-12-19 PROCEDURE — 99239 HOSP IP/OBS DSCHRG MGMT >30: CPT

## 2020-12-19 PROCEDURE — 82803 BLOOD GASES ANY COMBINATION: CPT

## 2020-12-19 PROCEDURE — 86803 HEPATITIS C AB TEST: CPT

## 2020-12-19 PROCEDURE — 71045 X-RAY EXAM CHEST 1 VIEW: CPT

## 2020-12-19 PROCEDURE — C1769: CPT

## 2020-12-19 PROCEDURE — 93452 LEFT HRT CATH W/VENTRCLGRPHY: CPT | Mod: 59

## 2020-12-19 PROCEDURE — C1874: CPT

## 2020-12-19 PROCEDURE — C1760: CPT

## 2020-12-19 PROCEDURE — 83036 HEMOGLOBIN GLYCOSYLATED A1C: CPT

## 2020-12-19 PROCEDURE — 85610 PROTHROMBIN TIME: CPT

## 2020-12-19 PROCEDURE — 84145 PROCALCITONIN (PCT): CPT

## 2020-12-19 PROCEDURE — C8929: CPT

## 2020-12-19 PROCEDURE — 93454 CORONARY ARTERY ANGIO S&I: CPT

## 2020-12-19 PROCEDURE — C1725: CPT

## 2020-12-19 PROCEDURE — 86769 SARS-COV-2 COVID-19 ANTIBODY: CPT

## 2020-12-19 PROCEDURE — C1889: CPT

## 2020-12-19 RX ORDER — SACUBITRIL AND VALSARTAN 24; 26 MG/1; MG/1
1 TABLET, FILM COATED ORAL
Qty: 60 | Refills: 0
Start: 2020-12-19 | End: 2021-01-17

## 2020-12-19 RX ORDER — ATORVASTATIN CALCIUM 80 MG/1
1 TABLET, FILM COATED ORAL
Qty: 30 | Refills: 0
Start: 2020-12-19 | End: 2021-01-17

## 2020-12-19 RX ORDER — MAGNESIUM SULFATE 500 MG/ML
1 VIAL (ML) INJECTION ONCE
Refills: 0 | Status: COMPLETED | OUTPATIENT
Start: 2020-12-19 | End: 2020-12-19

## 2020-12-19 RX ORDER — NICOTINE POLACRILEX 2 MG
1 GUM BUCCAL
Qty: 10 | Refills: 0
Start: 2020-12-19 | End: 2020-12-28

## 2020-12-19 RX ORDER — ALBUTEROL 90 UG/1
1 AEROSOL, METERED ORAL
Qty: 0 | Refills: 0 | DISCHARGE
Start: 2020-12-19

## 2020-12-19 RX ORDER — CLOPIDOGREL BISULFATE 75 MG/1
1 TABLET, FILM COATED ORAL
Qty: 30 | Refills: 0
Start: 2020-12-19 | End: 2021-01-17

## 2020-12-19 RX ORDER — FUROSEMIDE 40 MG
1 TABLET ORAL
Qty: 30 | Refills: 0
Start: 2020-12-19 | End: 2021-01-17

## 2020-12-19 RX ORDER — ASPIRIN/CALCIUM CARB/MAGNESIUM 324 MG
1 TABLET ORAL
Qty: 30 | Refills: 0
Start: 2020-12-19 | End: 2021-01-17

## 2020-12-19 RX ORDER — POLYETHYLENE GLYCOL 3350 17 G/17G
17 POWDER, FOR SOLUTION ORAL
Qty: 0 | Refills: 0 | DISCHARGE
Start: 2020-12-19

## 2020-12-19 RX ORDER — METOPROLOL TARTRATE 50 MG
1 TABLET ORAL
Qty: 30 | Refills: 0
Start: 2020-12-19 | End: 2021-01-17

## 2020-12-19 RX ADMIN — ENOXAPARIN SODIUM 40 MILLIGRAM(S): 100 INJECTION SUBCUTANEOUS at 11:59

## 2020-12-19 RX ADMIN — SACUBITRIL AND VALSARTAN 1 TABLET(S): 24; 26 TABLET, FILM COATED ORAL at 05:30

## 2020-12-19 RX ADMIN — Medication 1 PATCH: at 11:59

## 2020-12-19 RX ADMIN — Medication 2: at 12:12

## 2020-12-19 RX ADMIN — Medication 100 GRAM(S): at 12:00

## 2020-12-19 RX ADMIN — CLOPIDOGREL BISULFATE 75 MILLIGRAM(S): 75 TABLET, FILM COATED ORAL at 12:00

## 2020-12-19 RX ADMIN — Medication 2: at 08:20

## 2020-12-19 RX ADMIN — Medication 25 MILLIGRAM(S): at 05:30

## 2020-12-19 RX ADMIN — Medication 40 MILLIGRAM(S): at 05:31

## 2020-12-19 RX ADMIN — POLYETHYLENE GLYCOL 3350 17 GRAM(S): 17 POWDER, FOR SOLUTION ORAL at 11:59

## 2020-12-19 RX ADMIN — Medication 81 MILLIGRAM(S): at 11:59

## 2020-12-19 NOTE — PROGRESS NOTE ADULT - ASSESSMENT
Assessment: 65y Female with diagnosis of HFrEF and CAD    Problem List:    1. CAD, S/P PCI with rotational atherectomy and HERMES of the LAD.  ·	Groin with slight bleeding post cath.    ·	Groin site without complications. C/D/I, no hematoma  ·	Continue Plavix 75 mg daily and aspirin 81 mg daily  ·	Will need PCI of RCA as an outpatient  ·	EKG and labs reviewed.  Magnesium repleted  ·	Cardiac rehab info provided/referral and communication to cardiac rehab completed     2. HFrEF  ·	Lasix 40 mg IV BID for 2 days then on Sunday December 20, start 40 mg oral daily.  ·	GDMT: Toprol 25 mg daily and losartan 25 mg daily      3. DM  ·	FS Q AC and HS    4. HTN  ·	Toprol 25 mg daily  ·	Losartan 25 mg daily    5. HLD  ·	Lipitor 80 mg daily      Discharge Planning: Clear for discharge from cardiology perspective.  Follow up with Dr. Jauregui as an outpatient.

## 2020-12-19 NOTE — DIETITIAN INITIAL EVALUATION ADULT. - PERTINENT MEDS FT
MEDICATIONS  (STANDING):  aspirin  chewable 81 milliGRAM(s) Oral daily  atorvastatin 80 milliGRAM(s) Oral at bedtime  clopidogrel Tablet 75 milliGRAM(s) Oral daily  dextrose 40% Gel 15 Gram(s) Oral once  dextrose 5%. 1000 milliLiter(s) (50 mL/Hr) IV Continuous <Continuous>  dextrose 5%. 1000 milliLiter(s) (100 mL/Hr) IV Continuous <Continuous>  dextrose 50% Injectable 25 Gram(s) IV Push once  dextrose 50% Injectable 12.5 Gram(s) IV Push once  dextrose 50% Injectable 25 Gram(s) IV Push once  enoxaparin Injectable 40 milliGRAM(s) SubCutaneous daily  furosemide   Injectable 40 milliGRAM(s) IV Push two times a day  glucagon  Injectable 1 milliGRAM(s) IntraMuscular once  insulin lispro (ADMELOG) corrective regimen sliding scale   SubCutaneous three times a day before meals  insulin lispro (ADMELOG) corrective regimen sliding scale   SubCutaneous at bedtime  metoprolol succinate ER 25 milliGRAM(s) Oral daily  nicotine -   7 mG/24Hr(s) Patch 1 patch Transdermal daily  polyethylene glycol 3350 17 Gram(s) Oral daily  sacubitril 24 mG/valsartan 26 mG 1 Tablet(s) Oral two times a day    MEDICATIONS  (PRN):  acetaminophen   Tablet .. 650 milliGRAM(s) Oral every 4 hours PRN Mild Pain (1 - 3)  ALBUTerol    90 MICROgram(s) HFA Inhaler 1 Puff(s) Inhalation every 4 hours PRN Shortness of Breath and/or Wheezing

## 2020-12-19 NOTE — PROGRESS NOTE ADULT - PROVIDER SPECIALTY LIST ADULT
Cardiology
Hospitalist
Cardiology
Hospitalist
Cardiology
Hospitalist

## 2020-12-19 NOTE — PROGRESS NOTE ADULT - ATTENDING COMMENTS
Patient was discharged when I went to see the patient.    Concur with NP's note
pt seen and examined  Volume overload, leg edema. is improved.  TTE pending  Cont with current meds
Above noted. PT was seen and examined  Plan for PCI in AM, calcified arteries  Cont with HF meds  Leg edema has improved.  I agree with a/p.
Patient seen and examined this morning. reports breathing is better.     PHYSICAL EXAM:  Constitutional: No acute distress, alert and oriented by 3  HEENT: AT/NC, EOMI, PERRLA, Normal conjunctiva, no pharyngeal erythema, moist oral mucosa  Respiratory: CTA BL, equal breath sounds, no crackles or wheezing  Cardiovascular: RRR, no edema  Gastrointestinal: soft, Non-tender, Non-distended + Bowel sounds, no rebound or guarding  Musculoskeletal: No joint edema  Neurological: CN 2-12 grossly intact, no focal deficits  Skin: warm, dry and intact  Psychiatric: normal mood and affect    Discussed with MILAGROS Brooks and Agree with above  echo and card eval pending   continue IV Lasix
pt seen and examined  Cath result reviewed. Calcified lesions, plan for stage pci  this week.  I agree with a/p.
Above noted.  Pt is seen and examined  Clinically much improved. No cp, can lay supine   pci to LAD. RCA was not intervened on   Losartan is changed to entresto  Pt is on BB  cont with DAPT  Can change to po lasix starting sunday AM  Pt with NSVT on monitor early this AM. Check monitor in AM  DW Dr. Ashton.
Patient seen and examined. Feeling better.     PHYSICAL EXAM:  Constitutional: No acute distress, alert and oriented by 3  HEENT: AT/NC, EOMI, PERRLA, Normal conjunctiva, no pharyngreal erythema, moist oral mucosa  Respiratory: CTA BL, equal breath sounds, no crackles or wheezing  Cardiovascular: RRR, no edema  Gastrointestinal: soft, Non-tender, Non-distended + Bowel sounds, no rebound or guarding  Musculoskeletal: No joint edema  Neurological: CN 2-12 grossly intact, no focal deficits  Skin: warm, dry and intact  Psychiatric: normal mood and affect    Discussed with MILAGROS Macias and agree with above  echo still pending  replace electrolytes and follow up repeat  Will call Son today

## 2020-12-19 NOTE — PROGRESS NOTE ADULT - SUBJECTIVE AND OBJECTIVE BOX
[facility]                          [address] [place] [address]                       Phone: [phone]. Fax:[phone]                      ________________________________________________           Reason for follow up/Overnight events:   - No acute events overnight.  - Groin site stable, no hematoma, dressing C/D/I    HPI:  Ms Neri is a 66 yo F with hx of DM who comes to the ER for shortness of breath. She states it started about 1 month ago. She went to see her PCP and she was sent to the ER, this was back in Nov. From review of chart it seems like patient was sent home with albuterol and steroid from the ED. She reports symtoms persisted. Breathing is worse with minimal exertion. She reports a cough productive of clear sputum. Denies leg swelling and fever. She notes she sleeps with at least 2 pillows propped up. She is current everyday smoker and has been smoking for many years. She denies any cardiac medical issues,  In the ED she was noted to have an elevated BNP and a cxr which showed cardiomegaly and a slight increase in the rihgt sided pleural effusion. She was given IV lasix and [place] cardiology was consulted. She is being admitted to [facility] for further work up and evaluation. Her Covid test is pending.  (12 Dec 2020 18:02)      ROS: All review of systems negative unless indicated otherwise below.                          LAB RESULTS                   COMPLETE BLOOD COUNT( 18 Dec 2020 06:22 )                            11.3 g/dL<L>  7.40 K/uL )---------------( 250 K/uL                        35.8 %      Automated Differential     Auto Basophil # - X      Auto Basophil % - X      Auto Eosinophil # - X      Auto Eosinophil % - X      Auto Immature Granulocyte # - X      Auto Immature Granulocyte % - X      Auto Lymphocyte # - X      Auto Lymphocyte % - X      Auto Monocyte # - X      Auto Monocyte % - X      Auto Neutrophil # - X      Auto Neutrophil % - X                                      CHEMISTRY                 Basic Metabolic Panel (20 @ 07:31)    139  |  98  |  11.0  ----------------------------<  144<H>  3.6   |  28.0  |  0.77    Ca    9.1      19 Dec 2020 07:31  Mg     1.8                         Liver Functions (20 @ 07:31))  TPro  6.4  /  Alb  3.5  /  TBili  0.7  /  DBili  x   /  AST  25  /  ALT  21  /  AlkPhos  105     PT/INR/PTT ( 16 Dec 2020 09:37 )                        :                       :      13.3         :       X                     .        .                   .              .           .       1.15        .                                                                   Cardiac Enzymes   ( 14 Dec 2020 04:26 )  Troponin T  X    ,  CPK  X    , CKMB  X    , BNP 1193<H>    , ( 12 Dec 2020 21:44 )  Troponin T  <0.01,  CPK  X    , CKMB  X    , BNP X        , ( 12 Dec 2020 15:33 )  Troponin T  <0.01,  CPK  X    , CKMB  X    , BNP 2549<H>                              MICROBIOLOGY/CULTURES:                                RADIOLOGY RESULTS: Personally visualized                           CARDIOLOGY RESULTS: Official Report/Preliminary Verbal Reports    ECHO:   < from: TTE Echo Complete w/ Contrast w/ Doppler (20 @ 14:55) >  Left Ventricle: The left ventricular internal cavity size is normal.  Global LV systolic function was severely decreased. Left ventricular ejection fraction, by visual estimation, is 25 to 30%. Spectral Doppler shows pseudonormal pattern of left ventricular myocardial filling (Grade II diastolic dysfunction). Elevated mean left atrial pressure.  Right Ventricle: The right ventricular size is normal. RV systolic function is mildly reduced.  Left Atrium:Severely enlarged left atrium.  Right Atrium: The right atrium is normal in size.  Pericardium: There is no evidence of pericardial effusion.  Mitral Valve: The mitral valve is normal in structure. Mild thickening of the anterior and posterior mitralvalve leaflets. No evidence of mitral stenosis. Moderate mitral valve regurgitation is seen. The MR jet is centrally-directed.  Tricuspid Valve: Structurally normal tricuspid valve, with normal leaflet excursion. Mild tricuspid regurgitation is visualized. Adequate TR velocity was not obtained to accurately assess RVSP.  Aortic Valve: The aortic valve is trileaflet. Sclerotic aortic valve with normal opening. No evidence of aortic valve regurgitation is seen.  Pulmonic Valve: Mild to moderate pulmonic valve regurgitation.  Aorta: The aortic root is normal in size and structure.  Venous: The inferior vena cava was normal sized, with respiratory size variation greater than 50%.      Summary:   1. Left ventricular ejection fraction, by visual estimation, is 25 to 30%.   2. Severely decreased global left ventricular systolic function.   3. Spectral Doppler shows pseudonormal pattern of left ventricular myocardial filling (Grade II diastolic dysfunction). Elevated mean left atrial pressure.   4. Normal left ventricular internal cavity size.   5. There is mild concentric left ventricular hypertrophy.   6. Right ventricular size is normal Mildly reduced RV systolic function.   7. Severely enlarged left atrium.   8. The right atrium is normalin size.   9. Mild thickening of the anterior and posterior mitral valve leaflets.  10. Moderate mitral valve regurgitation.  11. Sclerotic aortic valve with normal opening.  12. Mild tricuspid regurgitation.  13. Mild to moderate pulmonic valve regurgitation.  14. There is no evidence of pericardial effusion.      < end of copied text >      CATH: Rotational artherectomy and 2 HERMES to LAD; still awaiting offical                           CARDIOLOGY REVIEW: Personally visualized and reviewed    EKG: Unchanged  Telemetry Last 24h: Infrequent PVCs.  No other events                             DAILY WEIGHTS - 48 HOUR TREND     Daily Weight in k (19 Dec 2020 05:05)                             INTAKE AND OUTPUT - 48 HOUR TREND     20 @ 07:01  -  20 @ 07:00  --------------------------------------------------------  IN:  Total IN: 0 mL    OUT:    Voided (mL): 3050 mL  Total OUT: 3050 mL    Total NET: -3050 mL          HOME MEDICATIONS:                             Current Admission Active Medications    acetaminophen   Tablet .. 650 milliGRAM(s) Oral every 4 hours PRN Mild Pain (1 - 3)  ALBUTerol    90 MICROgram(s) HFA Inhaler 1 Puff(s) Inhalation every 4 hours PRN Shortness of Breath and/or Wheezing  aspirin  chewable 81 milliGRAM(s) Oral daily  atorvastatin 80 milliGRAM(s) Oral at bedtime  clopidogrel Tablet 75 milliGRAM(s) Oral daily  dextrose 40% Gel 15 Gram(s) Oral once  dextrose 5%. 1000 milliLiter(s) (50 mL/Hr) IV Continuous <Continuous>  dextrose 5%. 1000 milliLiter(s) (100 mL/Hr) IV Continuous <Continuous>  dextrose 50% Injectable 25 Gram(s) IV Push once  dextrose 50% Injectable 12.5 Gram(s) IV Push once  dextrose 50% Injectable 25 Gram(s) IV Push once  enoxaparin Injectable 40 milliGRAM(s) SubCutaneous daily  furosemide   Injectable 40 milliGRAM(s) IV Push two times a day  glucagon  Injectable 1 milliGRAM(s) IntraMuscular once  influenza   Vaccine 0.5 milliLiter(s) IntraMuscular once  insulin lispro (ADMELOG) corrective regimen sliding scale   SubCutaneous three times a day before meals  insulin lispro (ADMELOG) corrective regimen sliding scale   SubCutaneous at bedtime  metoprolol succinate ER 25 milliGRAM(s) Oral daily  nicotine -   7 mG/24Hr(s) Patch 1 patch Transdermal daily  polyethylene glycol 3350 17 Gram(s) Oral daily  sacubitril 24 mG/valsartan 26 mG 1 Tablet(s) Oral two times a day                        PHYSICAL EXAM:    Vital Signs Last 24 Hrs  T(C): 36.8 (19 Dec 2020 05:29), Max: 36.8 (19 Dec 2020 05:29)  T(F): 98.3 (19 Dec 2020 05:29), Max: 98.3 (19 Dec 2020 05:29)  HR: 73 (19 Dec 2020 05:29) (62 - 79)  BP: 127/80 (19 Dec 2020 05:29) (106/58 - 141/69)  BP(mean): 82 (18 Dec 2020 17:23) (74 - 82)  RR: 18 (19 Dec 2020 05:29) (16 - 18)  SpO2: 95% (19 Dec 2020 05:29) (95% - 100%)    GENERAL: NAD  NECK: Supple, No JVD  NERVOUS SYSTEM:  Alert & Oriented X3, non focal neuro exam.   CHEST/LUNG: clear lungs, No rales, rhonchi, wheezing, or rubs  HEART: Regular rate and rhythm; s1 and s2 auscultated, No murmurs, rubs, or gallops  ABDOMEN: Soft, Nontender, Nondistended; Obese Bowel sounds present and normoactive.   EXTREMITIES:  2+ Peripheral Pulses, No clubbing, cyanosis, or edema  CATH SITE: C/D/I

## 2020-12-19 NOTE — DIETITIAN INITIAL EVALUATION ADULT. - DIET TYPE
Fluid Restriction per MD discretion/consistent carbohydrate (no snacks)/DASH/TLC (sodium and cholesterol restricted diet)

## 2020-12-19 NOTE — PROGRESS NOTE ADULT - REASON FOR ADMISSION
Shortness of breath

## 2020-12-19 NOTE — DIETITIAN INITIAL EVALUATION ADULT. - OTHER INFO
66 y/o F with hx of DM who comes to the ER for shortness of breath. In  the ED she was noted to have an elevated BNP and a cxr which showed cardiomegaly and a slight increase in the right sided pleural effusion. She was given IV Lasix and [place] cardiology was consulted. CXR with cardiomegaly and right effusion. s/p lasix in ED. COVID negative. She wass being admitted for Shortness of breath/PEDERSON likely due to acute CHF per documentation.  Pt denied chewing/swallowing difficulty. Pt reports having a fair appetite. % PO intake noted per flowsheet documentation. B/L leg edema noted 12/18/20. Surgical incision noted. Pt reports UBW ~200#. Question accuracy of CBW. Admission weight 215# noted. Weight fluctuations anticipated secondary to fluid shifts. Provided verbal diet education. RD to remain available.

## 2020-12-19 NOTE — DIETITIAN INITIAL EVALUATION ADULT. - PERTINENT LABORATORY DATA
12-19 Na139 mmol/L Glu 144 mg/dL<H> K+ 3.6 mmol/L Cr  0.77 mg/dL BUN 11.0 mg/dL Phos n/a   Alb 3.5 g/dL PAB n/a

## 2021-02-25 NOTE — DISCHARGE NOTE PROVIDER - HOSPITAL COURSE
How Severe Are Your Spot(S)?: mild What Type Of Note Output Would You Prefer (Optional)?: Standard Output What Is The Reason For Today's Visit?: Full Body Skin Examination What Is The Reason For Today's Visit? (Being Monitored For X): concerning skin lesions on an annual basis Pt is a 66 y/o F with hx of DM who comes to the ER for shortness of breath. In  the ED she was noted to have an elevated BNP and a cxr which showed cardiomegaly and a slight increase in the right sided pleural effusion. She was given IV Lasix and [place] cardiology was consulted. CXR with cardiomegaly and right effusion. s/p lasix in ED. COVID negative. She was admitted for Shortness of breath/PEDERSON likely due to acute CHF. She was started on IV lasix and cardiology was consulted. echo showed diastolic dysfunction and reduced ef 25-30% with moderate MR.  On 12/16 patient is s/p LHC revealing 80% mLAD and 80% mRCA; vessels calcified via RRA.  She was loaded with plavix and asa. On12/18 S/P PCI with rotational atherectomy and HERMES of the LAD. Will need PCI of RCA as an outpatient. Patient will be discharged on Metoprolol, Losartan , lasix , aspirin and Plavix, She is to follow up with Cardiology and PCP in 1-2 weeks.    Pt is a 66 y/o F with hx of DM who comes to the ER for shortness of breath. In  the ED she was noted to have an elevated BNP and a cxr which showed cardiomegaly and a slight increase in the right sided pleural effusion. She was given IV Lasix and [place] cardiology was consulted. CXR with cardiomegaly and right effusion. s/p lasix in ED. COVID negative. She was admitted for Shortness of breath/PEDERSON likely due to acute CHF. She was started on IV lasix and cardiology was consulted. echo showed diastolic dysfunction and reduced ef 25-30% with moderate MR.  On 12/16 patient is s/p LHC revealing 80% mLAD and 80% mRCA; vessels calcified via RRA.  She was loaded with plavix and asa. On12/18 S/P PCI with rotational atherectomy and HERMES of the LAD. Will need PCI of RCA as an outpatient. Patient will be discharged on Metoprolol, Losartan , lasix , aspirin and Plavix, She is to follow up with Cardiology and PCP in 1-2 weeks. patient cleared by cardio for dc home     Vital Signs Last 24 Hrs  T(C): 36.6 (19 Dec 2020 09:57), Max: 36.8 (19 Dec 2020 05:29)  T(F): 97.9 (19 Dec 2020 09:57), Max: 98.3 (19 Dec 2020 05:29)  HR: 85 (19 Dec 2020 09:57) (71 - 85)  BP: 120/65 (19 Dec 2020 09:57) (106/58 - 127/80)  BP(mean): 82 (18 Dec 2020 17:23) (74 - 82)  RR: 18 (19 Dec 2020 09:57) (18 - 18)  SpO2: 96% (19 Dec 2020 09:57) (95% - 99%)    time spent for this dc 55 mins

## 2022-04-22 NOTE — ED PROVIDER NOTE - NEURO NEGATIVE STATEMENT, MLM
GENERAL: well appearing in no acute distress, non-toxic appearing  HEAD: normocephalic, atraumatic  HEENT: normal conjunctiva, oral mucosa moist, uvula midline, no tonsilar exudates, neck supple, no JVD. TTP Tooth #18, TTP in left submandibular region.   CARDIAC: regular rate and rhythm  PULM: normal breath sounds  GI: abdomen nondistended, soft, nontender  NEURO: no focal motor or sensory deficits, A&Ox3  SKIN: well-perfused  PSYCH: appropriate mood and affect no loss of consciousness, no gait abnormality, no headache, no sensory deficits, and no weakness.

## 2022-08-12 ENCOUNTER — EMERGENCY (EMERGENCY)
Facility: HOSPITAL | Age: 67
LOS: 1 days | Discharge: HOME | End: 2022-08-12
Attending: STUDENT IN AN ORGANIZED HEALTH CARE EDUCATION/TRAINING PROGRAM
Payer: MEDICARE

## 2022-08-12 VITALS
OXYGEN SATURATION: 91 % | RESPIRATION RATE: 18 BRPM | TEMPERATURE: 98 F | HEART RATE: 89 BPM | DIASTOLIC BLOOD PRESSURE: 97 MMHG | SYSTOLIC BLOOD PRESSURE: 150 MMHG

## 2022-08-12 VITALS
RESPIRATION RATE: 18 BRPM | DIASTOLIC BLOOD PRESSURE: 60 MMHG | OXYGEN SATURATION: 94 % | HEART RATE: 88 BPM | SYSTOLIC BLOOD PRESSURE: 160 MMHG

## 2022-08-12 DIAGNOSIS — Z98.89 OTHER SPECIFIED POSTPROCEDURAL STATES: Chronic | ICD-10-CM

## 2022-08-12 LAB
AGGLUTINATION: PRESENT — SIGNIFICANT CHANGE UP
ALBUMIN SERPL ELPH-MCNC: 3.8 G/DL — SIGNIFICANT CHANGE UP (ref 3.3–5.2)
ALP SERPL-CCNC: 120 U/L — SIGNIFICANT CHANGE UP (ref 40–120)
ALT FLD-CCNC: 18 U/L — SIGNIFICANT CHANGE UP
AMPHET UR-MCNC: NEGATIVE — SIGNIFICANT CHANGE UP
ANION GAP SERPL CALC-SCNC: 10 MMOL/L — SIGNIFICANT CHANGE UP (ref 5–17)
ANISOCYTOSIS BLD QL: SLIGHT — SIGNIFICANT CHANGE UP
APAP SERPL-MCNC: <3 UG/ML — LOW (ref 10–26)
APPEARANCE UR: CLEAR — SIGNIFICANT CHANGE UP
AST SERPL-CCNC: 36 U/L — HIGH
BACTERIA # UR AUTO: ABNORMAL
BARBITURATES UR SCN-MCNC: NEGATIVE — SIGNIFICANT CHANGE UP
BASOPHILS # BLD AUTO: 0.06 K/UL — SIGNIFICANT CHANGE UP (ref 0–0.2)
BASOPHILS NFR BLD AUTO: 0.9 % — SIGNIFICANT CHANGE UP (ref 0–2)
BENZODIAZ UR-MCNC: NEGATIVE — SIGNIFICANT CHANGE UP
BILIRUB SERPL-MCNC: 0.3 MG/DL — LOW (ref 0.4–2)
BILIRUB UR-MCNC: NEGATIVE — SIGNIFICANT CHANGE UP
BUN SERPL-MCNC: 10.3 MG/DL — SIGNIFICANT CHANGE UP (ref 8–20)
CALCIUM SERPL-MCNC: 9.4 MG/DL — SIGNIFICANT CHANGE UP (ref 8.4–10.5)
CHLORIDE SERPL-SCNC: 101 MMOL/L — SIGNIFICANT CHANGE UP (ref 98–107)
CO2 SERPL-SCNC: 25 MMOL/L — SIGNIFICANT CHANGE UP (ref 22–29)
COCAINE METAB.OTHER UR-MCNC: POSITIVE
COLOR SPEC: YELLOW — SIGNIFICANT CHANGE UP
CREAT SERPL-MCNC: 0.56 MG/DL — SIGNIFICANT CHANGE UP (ref 0.5–1.3)
DIFF PNL FLD: NEGATIVE — SIGNIFICANT CHANGE UP
EGFR: 99 ML/MIN/1.73M2 — SIGNIFICANT CHANGE UP
EOSINOPHIL # BLD AUTO: 0.06 K/UL — SIGNIFICANT CHANGE UP (ref 0–0.5)
EOSINOPHIL NFR BLD AUTO: 0.9 % — SIGNIFICANT CHANGE UP (ref 0–6)
EPI CELLS # UR: SIGNIFICANT CHANGE UP
ETHANOL SERPL-MCNC: <10 MG/DL — SIGNIFICANT CHANGE UP (ref 0–9)
GIANT PLATELETS BLD QL SMEAR: PRESENT — SIGNIFICANT CHANGE UP
GLUCOSE SERPL-MCNC: 182 MG/DL — HIGH (ref 70–99)
GLUCOSE UR QL: NEGATIVE MG/DL — SIGNIFICANT CHANGE UP
HCT VFR BLD CALC: 31.4 % — LOW (ref 34.5–45)
HGB BLD-MCNC: 9.6 G/DL — LOW (ref 11.5–15.5)
KETONES UR-MCNC: NEGATIVE — SIGNIFICANT CHANGE UP
LEUKOCYTE ESTERASE UR-ACNC: ABNORMAL
LYMPHOCYTES # BLD AUTO: 0.53 K/UL — LOW (ref 1–3.3)
LYMPHOCYTES # BLD AUTO: 7.8 % — LOW (ref 13–44)
MACROCYTES BLD QL: SLIGHT — SIGNIFICANT CHANGE UP
MANUAL SMEAR VERIFICATION: SIGNIFICANT CHANGE UP
MCHC RBC-ENTMCNC: 22.6 PG — LOW (ref 27–34)
MCHC RBC-ENTMCNC: 30.6 GM/DL — LOW (ref 32–36)
MCV RBC AUTO: 74.1 FL — LOW (ref 80–100)
METHADONE UR-MCNC: NEGATIVE — SIGNIFICANT CHANGE UP
MONOCYTES # BLD AUTO: 0.29 K/UL — SIGNIFICANT CHANGE UP (ref 0–0.9)
MONOCYTES NFR BLD AUTO: 4.3 % — SIGNIFICANT CHANGE UP (ref 2–14)
NEUTROPHILS # BLD AUTO: 5.77 K/UL — SIGNIFICANT CHANGE UP (ref 1.8–7.4)
NEUTROPHILS NFR BLD AUTO: 84.4 % — HIGH (ref 43–77)
NITRITE UR-MCNC: NEGATIVE — SIGNIFICANT CHANGE UP
OPIATES UR-MCNC: NEGATIVE — SIGNIFICANT CHANGE UP
OVALOCYTES BLD QL SMEAR: SLIGHT — SIGNIFICANT CHANGE UP
PCP SPEC-MCNC: SIGNIFICANT CHANGE UP
PCP UR-MCNC: NEGATIVE — SIGNIFICANT CHANGE UP
PH UR: 7 — SIGNIFICANT CHANGE UP (ref 5–8)
PLAT MORPH BLD: NORMAL — SIGNIFICANT CHANGE UP
PLATELET # BLD AUTO: 274 K/UL — SIGNIFICANT CHANGE UP (ref 150–400)
POIKILOCYTOSIS BLD QL AUTO: SLIGHT — SIGNIFICANT CHANGE UP
POLYCHROMASIA BLD QL SMEAR: SIGNIFICANT CHANGE UP
POTASSIUM SERPL-MCNC: 5.6 MMOL/L — HIGH (ref 3.5–5.3)
POTASSIUM SERPL-SCNC: 5.6 MMOL/L — HIGH (ref 3.5–5.3)
PROT SERPL-MCNC: 7.7 G/DL — SIGNIFICANT CHANGE UP (ref 6.6–8.7)
PROT UR-MCNC: NEGATIVE — SIGNIFICANT CHANGE UP
RBC # BLD: 4.24 M/UL — SIGNIFICANT CHANGE UP (ref 3.8–5.2)
RBC # FLD: 22.1 % — HIGH (ref 10.3–14.5)
RBC BLD AUTO: ABNORMAL
RBC CASTS # UR COMP ASSIST: SIGNIFICANT CHANGE UP /HPF (ref 0–4)
SALICYLATES SERPL-MCNC: <0.6 MG/DL — LOW (ref 10–20)
SODIUM SERPL-SCNC: 136 MMOL/L — SIGNIFICANT CHANGE UP (ref 135–145)
SP GR SPEC: 1 — LOW (ref 1.01–1.02)
TARGETS BLD QL SMEAR: SLIGHT — SIGNIFICANT CHANGE UP
THC UR QL: NEGATIVE — SIGNIFICANT CHANGE UP
UROBILINOGEN FLD QL: NEGATIVE MG/DL — SIGNIFICANT CHANGE UP
VARIANT LYMPHS # BLD: 1.7 % — SIGNIFICANT CHANGE UP (ref 0–6)
WBC # BLD: 6.84 K/UL — SIGNIFICANT CHANGE UP (ref 3.8–10.5)
WBC # FLD AUTO: 6.84 K/UL — SIGNIFICANT CHANGE UP (ref 3.8–10.5)
WBC UR QL: ABNORMAL /HPF (ref 0–5)

## 2022-08-12 PROCEDURE — 96374 THER/PROPH/DIAG INJ IV PUSH: CPT

## 2022-08-12 PROCEDURE — 71045 X-RAY EXAM CHEST 1 VIEW: CPT | Mod: 26

## 2022-08-12 PROCEDURE — 36415 COLL VENOUS BLD VENIPUNCTURE: CPT

## 2022-08-12 PROCEDURE — 80307 DRUG TEST PRSMV CHEM ANLYZR: CPT

## 2022-08-12 PROCEDURE — 93005 ELECTROCARDIOGRAM TRACING: CPT

## 2022-08-12 PROCEDURE — 70450 CT HEAD/BRAIN W/O DYE: CPT | Mod: 26,MD

## 2022-08-12 PROCEDURE — 82962 GLUCOSE BLOOD TEST: CPT

## 2022-08-12 PROCEDURE — 85025 COMPLETE CBC W/AUTO DIFF WBC: CPT

## 2022-08-12 PROCEDURE — 70450 CT HEAD/BRAIN W/O DYE: CPT | Mod: MD

## 2022-08-12 PROCEDURE — 93010 ELECTROCARDIOGRAM REPORT: CPT

## 2022-08-12 PROCEDURE — 87086 URINE CULTURE/COLONY COUNT: CPT

## 2022-08-12 PROCEDURE — 80053 COMPREHEN METABOLIC PANEL: CPT

## 2022-08-12 PROCEDURE — 81001 URINALYSIS AUTO W/SCOPE: CPT

## 2022-08-12 PROCEDURE — 99285 EMERGENCY DEPT VISIT HI MDM: CPT

## 2022-08-12 PROCEDURE — 71045 X-RAY EXAM CHEST 1 VIEW: CPT

## 2022-08-12 PROCEDURE — 99285 EMERGENCY DEPT VISIT HI MDM: CPT | Mod: 25

## 2022-08-12 RX ORDER — HALOPERIDOL DECANOATE 100 MG/ML
2.5 INJECTION INTRAMUSCULAR ONCE
Refills: 0 | Status: DISCONTINUED | OUTPATIENT
Start: 2022-08-12 | End: 2022-08-12

## 2022-08-12 RX ORDER — HALOPERIDOL DECANOATE 100 MG/ML
2.5 INJECTION INTRAMUSCULAR ONCE
Refills: 0 | Status: COMPLETED | OUTPATIENT
Start: 2022-08-12 | End: 2022-08-12

## 2022-08-12 RX ADMIN — HALOPERIDOL DECANOATE 2.5 MILLIGRAM(S): 100 INJECTION INTRAMUSCULAR at 15:42

## 2022-08-12 NOTE — ED PROVIDER NOTE - ATTENDING CONTRIBUTION TO CARE
67F HTN, HLD, DM, CAD biba following a seizure? Patient awake and alert, knows her name and date, otherwise not answering questions. Multiple attempts to call family members as numbers were inaccurate in system. able to reach son who reports she had episode of was staring blankly with foaming at mouth but no shaking so he called EMS. unable to clearly describe her baseline.   AP - patient is ambulatory, follows commands, nonfocal neuro exam. unclear baseline, will request family to come to ED to see if patient at baseline. check labs, CTH. reassess

## 2022-08-12 NOTE — ED PROVIDER NOTE - PROGRESS NOTE DETAILS
pt continues to be unable to provide hx, only replying in 1 word responses at this time -Fady Wilson MD PGY-1 multiple attempts to reach family members at listed phone numbers no response. patient does not have phone on her either. will consult neuro Spoke to son Rock ([phone]) who states earlier today pt was staring blankly, began foaming at the mouth, and was not able to tell him what his name was, so he called EMS. No shaking, and is unable to describe her baseline. States either him or his aunt will be coming soon. Spoke with son Rock at bedside, who states pt is behaving at baseline. He is comfortable with taking her home. Informed him of CT head findings and need for outpatient neuro followup for possible MRI, and he is in agreement. -Fady Wilson MD PGY-1 pt continues to be unable to provide hx, only replying in 1 word responses at this time, unsure of baseline -Fady Wilson MD PGY-1 Spoke with son Rock at bedside, who states pt is behaving at baseline. Pt now replying in full sentences, although still unsure what happened earlier today. Son is comfortable with taking her home. Informed him of CT head findings and need for outpatient neuro followup for further workup, and he is in agreement. -Fady Wilson MD PGY-1

## 2022-08-12 NOTE — ED ADULT NURSE NOTE - CHIEF COMPLAINT QUOTE
pt comes to ed from home. son recently took patient out of [facility] but was unsure of much of her medical history with EMS. Patient had seizure about a month ago and reportedly had another seizure today. Son called EMS. un sure if she is on seizure meds. pt post ictal in triage repeating her name when asked questions.

## 2022-08-12 NOTE — ED ADULT TRIAGE NOTE - CHIEF COMPLAINT QUOTE
pt comes to ed from home. son recently took patient out of [facility] but was unsure of much of her medical history with EMS. Patient had seizure about a month ago and reportedly had another seizure today. Son called EMS. un sure if she is on seizure meds. pt post ictal in triage repeating her name when asked questions.

## 2022-08-12 NOTE — ED PROVIDER NOTE - OBJECTIVE STATEMENT
67F HTN, HLD, DM, CAD biba following a seizure. Unable to obtain more hx as pt not responding appropriately. Multiple attempts to call family members unsuccessful

## 2022-08-12 NOTE — ED PROVIDER NOTE - PATIENT PORTAL LINK FT
You can access the FollowMyHealth Patient Portal offered by [facility] by registering at the following website: [email or web address]. By joining [employer]’s FollowMyHealth portal, you will also be able to view your health information using other applications (apps) compatible with our system.

## 2022-08-12 NOTE — ED PROVIDER NOTE - PROGRESS NOTE ADDITIONAL1
1oz pedialyte admin per GT at this time. Pt tolerated well, no gagging or emesis, resting quietly for duration of bolus. Mother notes that at home, pt poor tolerance upon initiation of feeds, will notify staff if pt gags or vomits. Safety maintained, will monitor.    Additional Progress Note...

## 2022-08-12 NOTE — ED PROVIDER NOTE - CARE PROVIDER_API CALL
Carmine Gracia; PhD)  Neurology; Vascular Neurology  [address] [place] [address]  Phone: [phone]  Fax: [phone]  Follow Up Time:

## 2022-08-12 NOTE — ED PROVIDER NOTE - PHYSICAL EXAMINATION
General: Awake, alert, lying in bed in NAD. Intermittently repeating her name and "Friday" (day of week)  HEENT: Normocephalic, atraumatic. No scleral icterus or conjunctival injection. EOMI. Moist mucous membranes. Oropharynx clear.   Neck:. Soft and supple. Full ROM without pain. No midline tenderness. No lymphadenopathy.  Cardiac: RRR, +S1/S2. No murmurs, rubs, gallops. Peripheral pulses 2+ and symmetric. No LE edema.  Resp: Lungs CTAB. No wheezes, rales or rhonchi.  Abd: Soft, non-tender, non-distended. No guarding, rebound, or rigidity. No scars, masses, or lesions.  Back: Unable to assess  Skin: No rashes, abrasions, or lacerations.  Neuro: Unable to perform complete neuro exam as pt is intermittently cooperative. Alert, oriented to self, day of week, location ("hospital"). Moves all extremities symmetrically. 5/5  strength and in lower extremities b/l  Psych: Unable to assess General: Awake, alert, lying in bed in NAD. Intermittently repeating her name and "Friday" (day of week)  HEENT: Normocephalic, atraumatic. No scleral icterus or conjunctival injection. EOMI. Moist mucous membranes. Oropharynx clear.   Neck:. Soft and supple. Full ROM without pain. No midline tenderness. No lymphadenopathy.  Cardiac: RRR, +S1/S2. No murmurs, rubs, gallops. Peripheral pulses 2+ and symmetric. No LE edema.  Resp: Lungs CTAB. No wheezes, rales or rhonchi.  Abd: Soft, non-tender, non-distended. No guarding, rebound, or rigidity. No scars, masses, or lesions.  Back: Unable to assess  Skin: No rashes, abrasions, or lacerations.  Neuro: Unable to perform complete neuro exam as pt is intermittently cooperative. Alert, oriented to self, day of week, location ("[facility] [place] Replies in 1 word responses. Moves all extremities symmetrically. 5/5  strength and in lower extremities b/l  Psych: Unable to assess

## 2022-08-12 NOTE — ED PROVIDER NOTE - CLINICAL SUMMARY MEDICAL DECISION MAKING FREE TEXT BOX
67F hx HTN, HLD, DM, CAD presents following a seizure per EMS report. Unable to obtain more collateral information, multiple attempts to reach family unsuccessful. In triage pt was observed constantly repeating her name, although on exam pt intermittently able to respond appropriately. Possible post-ictal as pt questionably improving during stay. CTH eval for intracranial pathology including bleed or tumor, basic labs, tox screen r/o withdrawal seizures

## 2022-08-13 LAB
CULTURE RESULTS: NO GROWTH — SIGNIFICANT CHANGE UP
SPECIMEN SOURCE: SIGNIFICANT CHANGE UP

## 2023-04-16 ENCOUNTER — INPATIENT (INPATIENT)
Facility: HOSPITAL | Age: 68
LOS: 5 days | Discharge: HOME HEALTH | DRG: 291 | End: 2023-04-22
Attending: HOSPITALIST | Admitting: STUDENT IN AN ORGANIZED HEALTH CARE EDUCATION/TRAINING PROGRAM
Payer: MEDICARE

## 2023-04-16 VITALS
RESPIRATION RATE: 16 BRPM | OXYGEN SATURATION: 95 % | SYSTOLIC BLOOD PRESSURE: 127 MMHG | DIASTOLIC BLOOD PRESSURE: 67 MMHG | HEART RATE: 82 BPM | TEMPERATURE: 98 F

## 2023-04-16 DIAGNOSIS — I50.9 HEART FAILURE, UNSPECIFIED: ICD-10-CM

## 2023-04-16 DIAGNOSIS — E78.5 HYPERLIPIDEMIA, UNSPECIFIED: ICD-10-CM

## 2023-04-16 DIAGNOSIS — Z98.89 OTHER SPECIFIED POSTPROCEDURAL STATES: Chronic | ICD-10-CM

## 2023-04-16 DIAGNOSIS — I10 ESSENTIAL (PRIMARY) HYPERTENSION: ICD-10-CM

## 2023-04-16 DIAGNOSIS — I25.10 ATHEROSCLEROTIC HEART DISEASE OF NATIVE CORONARY ARTERY WITHOUT ANGINA PECTORIS: ICD-10-CM

## 2023-04-16 DIAGNOSIS — I50.21 ACUTE SYSTOLIC (CONGESTIVE) HEART FAILURE: ICD-10-CM

## 2023-04-16 LAB
ALBUMIN SERPL ELPH-MCNC: 3 G/DL — LOW (ref 3.3–5.2)
ALP SERPL-CCNC: 398 U/L — HIGH (ref 40–120)
ALT FLD-CCNC: 16 U/L — SIGNIFICANT CHANGE UP
AMPHET UR-MCNC: NEGATIVE — SIGNIFICANT CHANGE UP
ANION GAP SERPL CALC-SCNC: 13 MMOL/L — SIGNIFICANT CHANGE UP (ref 5–17)
ANION GAP SERPL CALC-SCNC: 13 MMOL/L — SIGNIFICANT CHANGE UP (ref 5–17)
ANISOCYTOSIS BLD QL: SLIGHT — SIGNIFICANT CHANGE UP
ANISOCYTOSIS BLD QL: SLIGHT — SIGNIFICANT CHANGE UP
APPEARANCE UR: CLEAR — SIGNIFICANT CHANGE UP
AST SERPL-CCNC: 18 U/L — SIGNIFICANT CHANGE UP
BACTERIA # UR AUTO: ABNORMAL
BARBITURATES UR SCN-MCNC: NEGATIVE — SIGNIFICANT CHANGE UP
BASE EXCESS BLDA CALC-SCNC: 5.7 MMOL/L — HIGH (ref -2–3)
BASE EXCESS BLDV CALC-SCNC: 4 MMOL/L — HIGH (ref -2–3)
BASE EXCESS BLDV CALC-SCNC: 5.9 MMOL/L — HIGH (ref -2–3)
BASE EXCESS BLDV CALC-SCNC: 8.7 MMOL/L — HIGH (ref -2–3)
BASOPHILS # BLD AUTO: 0 K/UL — SIGNIFICANT CHANGE UP (ref 0–0.2)
BASOPHILS # BLD AUTO: 0.04 K/UL — SIGNIFICANT CHANGE UP (ref 0–0.2)
BASOPHILS NFR BLD AUTO: 0 % — SIGNIFICANT CHANGE UP (ref 0–2)
BASOPHILS NFR BLD AUTO: 0.6 % — SIGNIFICANT CHANGE UP (ref 0–2)
BENZODIAZ UR-MCNC: NEGATIVE — SIGNIFICANT CHANGE UP
BILIRUB SERPL-MCNC: 1.7 MG/DL — SIGNIFICANT CHANGE UP (ref 0.4–2)
BILIRUB UR-MCNC: NEGATIVE — SIGNIFICANT CHANGE UP
BLOOD GAS COMMENTS ARTERIAL: SIGNIFICANT CHANGE UP
BUN SERPL-MCNC: 12 MG/DL — SIGNIFICANT CHANGE UP (ref 8–20)
BUN SERPL-MCNC: 15 MG/DL — SIGNIFICANT CHANGE UP (ref 8–20)
CA-I SERPL-SCNC: 1.07 MMOL/L — LOW (ref 1.15–1.33)
CA-I SERPL-SCNC: 1.12 MMOL/L — LOW (ref 1.15–1.33)
CA-I SERPL-SCNC: 1.14 MMOL/L — LOW (ref 1.15–1.33)
CALCIUM SERPL-MCNC: 8.3 MG/DL — LOW (ref 8.4–10.5)
CALCIUM SERPL-MCNC: 8.6 MG/DL — SIGNIFICANT CHANGE UP (ref 8.4–10.5)
CHLORIDE BLDV-SCNC: 102 MMOL/L — SIGNIFICANT CHANGE UP (ref 96–108)
CHLORIDE BLDV-SCNC: 104 MMOL/L — SIGNIFICANT CHANGE UP (ref 96–108)
CHLORIDE BLDV-SCNC: 106 MMOL/L — SIGNIFICANT CHANGE UP (ref 96–108)
CHLORIDE SERPL-SCNC: 101 MMOL/L — SIGNIFICANT CHANGE UP (ref 96–108)
CHLORIDE SERPL-SCNC: 106 MMOL/L — SIGNIFICANT CHANGE UP (ref 96–108)
CO2 SERPL-SCNC: 25 MMOL/L — SIGNIFICANT CHANGE UP (ref 22–29)
CO2 SERPL-SCNC: 30 MMOL/L — HIGH (ref 22–29)
COCAINE METAB.OTHER UR-MCNC: POSITIVE
COLOR SPEC: YELLOW — SIGNIFICANT CHANGE UP
CREAT SERPL-MCNC: 0.7 MG/DL — SIGNIFICANT CHANGE UP (ref 0.5–1.3)
CREAT SERPL-MCNC: 0.81 MG/DL — SIGNIFICANT CHANGE UP (ref 0.5–1.3)
DIFF PNL FLD: ABNORMAL
EGFR: 78 ML/MIN/1.73M2 — SIGNIFICANT CHANGE UP
EGFR: 93 ML/MIN/1.73M2 — SIGNIFICANT CHANGE UP
EOSINOPHIL # BLD AUTO: 0 K/UL — SIGNIFICANT CHANGE UP (ref 0–0.5)
EOSINOPHIL # BLD AUTO: 0.08 K/UL — SIGNIFICANT CHANGE UP (ref 0–0.5)
EOSINOPHIL NFR BLD AUTO: 0 % — SIGNIFICANT CHANGE UP (ref 0–6)
EOSINOPHIL NFR BLD AUTO: 1.2 % — SIGNIFICANT CHANGE UP (ref 0–6)
EPI CELLS # UR: SIGNIFICANT CHANGE UP
ETHANOL SERPL-MCNC: <10 MG/DL — SIGNIFICANT CHANGE UP (ref 0–9)
GAS PNL BLDA: SIGNIFICANT CHANGE UP
GAS PNL BLDV: 139 MMOL/L — SIGNIFICANT CHANGE UP (ref 136–145)
GAS PNL BLDV: 139 MMOL/L — SIGNIFICANT CHANGE UP (ref 136–145)
GAS PNL BLDV: 141 MMOL/L — SIGNIFICANT CHANGE UP (ref 136–145)
GAS PNL BLDV: SIGNIFICANT CHANGE UP
GIANT PLATELETS BLD QL SMEAR: PRESENT — SIGNIFICANT CHANGE UP
GLUCOSE BLDC GLUCOMTR-MCNC: 147 MG/DL — HIGH (ref 70–99)
GLUCOSE BLDC GLUCOMTR-MCNC: 158 MG/DL — HIGH (ref 70–99)
GLUCOSE BLDV-MCNC: 168 MG/DL — HIGH (ref 70–99)
GLUCOSE BLDV-MCNC: 170 MG/DL — HIGH (ref 70–99)
GLUCOSE BLDV-MCNC: 173 MG/DL — HIGH (ref 70–99)
GLUCOSE SERPL-MCNC: 152 MG/DL — HIGH (ref 70–99)
GLUCOSE SERPL-MCNC: 165 MG/DL — HIGH (ref 70–99)
GLUCOSE UR QL: NEGATIVE MG/DL — SIGNIFICANT CHANGE UP
HCO3 BLDA-SCNC: 31 MMOL/L — HIGH (ref 21–28)
HCO3 BLDV-SCNC: 29 MMOL/L — SIGNIFICANT CHANGE UP (ref 22–29)
HCO3 BLDV-SCNC: 30 MMOL/L — HIGH (ref 22–29)
HCO3 BLDV-SCNC: 34 MMOL/L — HIGH (ref 22–29)
HCT VFR BLD CALC: 27.9 % — LOW (ref 34.5–45)
HCT VFR BLD CALC: 31.5 % — LOW (ref 34.5–45)
HCT VFR BLDA CALC: 23 % — SIGNIFICANT CHANGE UP
HCT VFR BLDA CALC: 25 % — SIGNIFICANT CHANGE UP
HCT VFR BLDA CALC: 26 % — SIGNIFICANT CHANGE UP
HGB BLD CALC-MCNC: 7.8 G/DL — LOW (ref 11.7–16.1)
HGB BLD CALC-MCNC: 8.4 G/DL — LOW (ref 11.7–16.1)
HGB BLD CALC-MCNC: 8.5 G/DL — LOW (ref 11.7–16.1)
HGB BLD-MCNC: 7.4 G/DL — LOW (ref 11.5–15.5)
HGB BLD-MCNC: 8.3 G/DL — LOW (ref 11.5–15.5)
HOROWITZ INDEX BLDA+IHG-RTO: SIGNIFICANT CHANGE UP
HYPOCHROMIA BLD QL: SIGNIFICANT CHANGE UP
HYPOCHROMIA BLD QL: SLIGHT — SIGNIFICANT CHANGE UP
IMM GRANULOCYTES NFR BLD AUTO: 0.4 % — SIGNIFICANT CHANGE UP (ref 0–0.9)
KETONES UR-MCNC: NEGATIVE — SIGNIFICANT CHANGE UP
LACTATE BLDV-MCNC: 1.4 MMOL/L — SIGNIFICANT CHANGE UP (ref 0.5–2)
LACTATE BLDV-MCNC: 1.7 MMOL/L — SIGNIFICANT CHANGE UP (ref 0.5–2)
LACTATE BLDV-MCNC: 1.8 MMOL/L — SIGNIFICANT CHANGE UP (ref 0.5–2)
LEUKOCYTE ESTERASE UR-ACNC: NEGATIVE — SIGNIFICANT CHANGE UP
LYMPHOCYTES # BLD AUTO: 0.33 K/UL — LOW (ref 1–3.3)
LYMPHOCYTES # BLD AUTO: 1.03 K/UL — SIGNIFICANT CHANGE UP (ref 1–3.3)
LYMPHOCYTES # BLD AUTO: 15.4 % — SIGNIFICANT CHANGE UP (ref 13–44)
LYMPHOCYTES # BLD AUTO: 5.3 % — LOW (ref 13–44)
MANUAL SMEAR VERIFICATION: SIGNIFICANT CHANGE UP
MANUAL SMEAR VERIFICATION: SIGNIFICANT CHANGE UP
MCHC RBC-ENTMCNC: 16.2 PG — LOW (ref 27–34)
MCHC RBC-ENTMCNC: 16.3 PG — LOW (ref 27–34)
MCHC RBC-ENTMCNC: 26.3 GM/DL — LOW (ref 32–36)
MCHC RBC-ENTMCNC: 26.5 GM/DL — LOW (ref 32–36)
MCV RBC AUTO: 61.1 FL — LOW (ref 80–100)
MCV RBC AUTO: 61.9 FL — LOW (ref 80–100)
METHADONE UR-MCNC: NEGATIVE — SIGNIFICANT CHANGE UP
MICROCYTES BLD QL: SLIGHT — SIGNIFICANT CHANGE UP
MICROCYTES BLD QL: SLIGHT — SIGNIFICANT CHANGE UP
MONOCYTES # BLD AUTO: 0.44 K/UL — SIGNIFICANT CHANGE UP (ref 0–0.9)
MONOCYTES # BLD AUTO: 0.81 K/UL — SIGNIFICANT CHANGE UP (ref 0–0.9)
MONOCYTES NFR BLD AUTO: 12.1 % — SIGNIFICANT CHANGE UP (ref 2–14)
MONOCYTES NFR BLD AUTO: 7 % — SIGNIFICANT CHANGE UP (ref 2–14)
NEUTROPHILS # BLD AUTO: 4.68 K/UL — SIGNIFICANT CHANGE UP (ref 1.8–7.4)
NEUTROPHILS # BLD AUTO: 5.46 K/UL — SIGNIFICANT CHANGE UP (ref 1.8–7.4)
NEUTROPHILS NFR BLD AUTO: 70.3 % — SIGNIFICANT CHANGE UP (ref 43–77)
NEUTROPHILS NFR BLD AUTO: 87.7 % — HIGH (ref 43–77)
NITRITE UR-MCNC: NEGATIVE — SIGNIFICANT CHANGE UP
NT-PROBNP SERPL-SCNC: 2827 PG/ML — HIGH (ref 0–300)
OPIATES UR-MCNC: NEGATIVE — SIGNIFICANT CHANGE UP
OVALOCYTES BLD QL SMEAR: SLIGHT — SIGNIFICANT CHANGE UP
PCO2 BLDA: 52 MMHG — HIGH (ref 32–45)
PCO2 BLDV: 43 MMHG — HIGH (ref 39–42)
PCO2 BLDV: 52 MMHG — HIGH (ref 39–42)
PCO2 BLDV: 64 MMHG — HIGH (ref 39–42)
PCP SPEC-MCNC: SIGNIFICANT CHANGE UP
PCP UR-MCNC: NEGATIVE — SIGNIFICANT CHANGE UP
PH BLDA: 7.38 — SIGNIFICANT CHANGE UP (ref 7.35–7.45)
PH BLDV: 7.34 — SIGNIFICANT CHANGE UP (ref 7.32–7.43)
PH BLDV: 7.36 — SIGNIFICANT CHANGE UP (ref 7.32–7.43)
PH BLDV: 7.45 — HIGH (ref 7.32–7.43)
PH UR: 7 — SIGNIFICANT CHANGE UP (ref 5–8)
PLAT MORPH BLD: NORMAL — SIGNIFICANT CHANGE UP
PLAT MORPH BLD: NORMAL — SIGNIFICANT CHANGE UP
PLATELET # BLD AUTO: 250 K/UL — SIGNIFICANT CHANGE UP (ref 150–400)
PLATELET # BLD AUTO: 251 K/UL — SIGNIFICANT CHANGE UP (ref 150–400)
PO2 BLDA: 119 MMHG — HIGH (ref 83–108)
PO2 BLDV: 122 MMHG — HIGH (ref 25–45)
PO2 BLDV: 58 MMHG — HIGH (ref 25–45)
PO2 BLDV: 89 MMHG — HIGH (ref 25–45)
POIKILOCYTOSIS BLD QL AUTO: SIGNIFICANT CHANGE UP
POIKILOCYTOSIS BLD QL AUTO: SIGNIFICANT CHANGE UP
POLYCHROMASIA BLD QL SMEAR: SIGNIFICANT CHANGE UP
POLYCHROMASIA BLD QL SMEAR: SLIGHT — SIGNIFICANT CHANGE UP
POTASSIUM BLDV-SCNC: 2.9 MMOL/L — CRITICAL LOW (ref 3.5–5.1)
POTASSIUM BLDV-SCNC: 3.1 MMOL/L — LOW (ref 3.5–5.1)
POTASSIUM BLDV-SCNC: 3.6 MMOL/L — SIGNIFICANT CHANGE UP (ref 3.5–5.1)
POTASSIUM SERPL-MCNC: 3.1 MMOL/L — LOW (ref 3.5–5.3)
POTASSIUM SERPL-MCNC: 3.3 MMOL/L — LOW (ref 3.5–5.3)
POTASSIUM SERPL-SCNC: 3.1 MMOL/L — LOW (ref 3.5–5.3)
POTASSIUM SERPL-SCNC: 3.3 MMOL/L — LOW (ref 3.5–5.3)
PROT SERPL-MCNC: 6.5 G/DL — LOW (ref 6.6–8.7)
PROT UR-MCNC: NEGATIVE — SIGNIFICANT CHANGE UP
RAPID RVP RESULT: SIGNIFICANT CHANGE UP
RBC # BLD: 4.57 M/UL — SIGNIFICANT CHANGE UP (ref 3.8–5.2)
RBC # BLD: 5.09 M/UL — SIGNIFICANT CHANGE UP (ref 3.8–5.2)
RBC # FLD: 25.6 % — HIGH (ref 10.3–14.5)
RBC # FLD: 25.8 % — HIGH (ref 10.3–14.5)
RBC BLD AUTO: ABNORMAL
RBC BLD AUTO: ABNORMAL
RBC CASTS # UR COMP ASSIST: SIGNIFICANT CHANGE UP /HPF (ref 0–4)
SAO2 % BLDA: 98.2 % — HIGH (ref 94–98)
SAO2 % BLDV: 86.1 % — SIGNIFICANT CHANGE UP
SAO2 % BLDV: 98.2 % — SIGNIFICANT CHANGE UP
SAO2 % BLDV: 98.6 % — SIGNIFICANT CHANGE UP
SARS-COV-2 RNA SPEC QL NAA+PROBE: SIGNIFICANT CHANGE UP
SCHISTOCYTES BLD QL AUTO: SLIGHT — SIGNIFICANT CHANGE UP
SCHISTOCYTES BLD QL AUTO: SLIGHT — SIGNIFICANT CHANGE UP
SMUDGE CELLS # BLD: PRESENT — SIGNIFICANT CHANGE UP
SODIUM SERPL-SCNC: 143 MMOL/L — SIGNIFICANT CHANGE UP (ref 135–145)
SODIUM SERPL-SCNC: 144 MMOL/L — SIGNIFICANT CHANGE UP (ref 135–145)
SP GR SPEC: 1 — LOW (ref 1.01–1.02)
TARGETS BLD QL SMEAR: SIGNIFICANT CHANGE UP
TARGETS BLD QL SMEAR: SIGNIFICANT CHANGE UP
THC UR QL: NEGATIVE — SIGNIFICANT CHANGE UP
TROPONIN T SERPL-MCNC: 0.03 NG/ML — SIGNIFICANT CHANGE UP (ref 0–0.06)
TROPONIN T SERPL-MCNC: 0.03 NG/ML — SIGNIFICANT CHANGE UP (ref 0–0.06)
UROBILINOGEN FLD QL: NEGATIVE MG/DL — SIGNIFICANT CHANGE UP
WBC # BLD: 6.23 K/UL — SIGNIFICANT CHANGE UP (ref 3.8–10.5)
WBC # BLD: 6.67 K/UL — SIGNIFICANT CHANGE UP (ref 3.8–10.5)
WBC # FLD AUTO: 6.23 K/UL — SIGNIFICANT CHANGE UP (ref 3.8–10.5)
WBC # FLD AUTO: 6.67 K/UL — SIGNIFICANT CHANGE UP (ref 3.8–10.5)
WBC UR QL: SIGNIFICANT CHANGE UP /HPF (ref 0–5)

## 2023-04-16 PROCEDURE — 99291 CRITICAL CARE FIRST HOUR: CPT

## 2023-04-16 PROCEDURE — 93970 EXTREMITY STUDY: CPT | Mod: 26

## 2023-04-16 PROCEDURE — 71275 CT ANGIOGRAPHY CHEST: CPT | Mod: 26,MA

## 2023-04-16 PROCEDURE — 99223 1ST HOSP IP/OBS HIGH 75: CPT

## 2023-04-16 PROCEDURE — 70450 CT HEAD/BRAIN W/O DYE: CPT | Mod: 26,MA

## 2023-04-16 PROCEDURE — 71045 X-RAY EXAM CHEST 1 VIEW: CPT | Mod: 26

## 2023-04-16 PROCEDURE — 99222 1ST HOSP IP/OBS MODERATE 55: CPT

## 2023-04-16 PROCEDURE — 93306 TTE W/DOPPLER COMPLETE: CPT | Mod: 26

## 2023-04-16 RX ORDER — DEXTROSE 50 % IN WATER 50 %
25 SYRINGE (ML) INTRAVENOUS ONCE
Refills: 0 | Status: DISCONTINUED | OUTPATIENT
Start: 2023-04-16 | End: 2023-04-22

## 2023-04-16 RX ORDER — IPRATROPIUM/ALBUTEROL SULFATE 18-103MCG
3 AEROSOL WITH ADAPTER (GRAM) INHALATION ONCE
Refills: 0 | Status: COMPLETED | OUTPATIENT
Start: 2023-04-16 | End: 2023-04-16

## 2023-04-16 RX ORDER — NALOXONE HYDROCHLORIDE 4 MG/.1ML
0.4 SPRAY NASAL ONCE
Refills: 0 | Status: COMPLETED | OUTPATIENT
Start: 2023-04-16 | End: 2023-04-16

## 2023-04-16 RX ORDER — SODIUM CHLORIDE 9 MG/ML
1000 INJECTION, SOLUTION INTRAVENOUS
Refills: 0 | Status: DISCONTINUED | OUTPATIENT
Start: 2023-04-16 | End: 2023-04-22

## 2023-04-16 RX ORDER — DEXTROSE 50 % IN WATER 50 %
15 SYRINGE (ML) INTRAVENOUS ONCE
Refills: 0 | Status: DISCONTINUED | OUTPATIENT
Start: 2023-04-16 | End: 2023-04-22

## 2023-04-16 RX ORDER — FUROSEMIDE 40 MG
40 TABLET ORAL
Refills: 0 | Status: DISCONTINUED | OUTPATIENT
Start: 2023-04-16 | End: 2023-04-18

## 2023-04-16 RX ORDER — LANOLIN ALCOHOL/MO/W.PET/CERES
3 CREAM (GRAM) TOPICAL AT BEDTIME
Refills: 0 | Status: DISCONTINUED | OUTPATIENT
Start: 2023-04-16 | End: 2023-04-22

## 2023-04-16 RX ORDER — INSULIN LISPRO 100/ML
VIAL (ML) SUBCUTANEOUS
Refills: 0 | Status: DISCONTINUED | OUTPATIENT
Start: 2023-04-16 | End: 2023-04-22

## 2023-04-16 RX ORDER — FUROSEMIDE 40 MG
80 TABLET ORAL ONCE
Refills: 0 | Status: COMPLETED | OUTPATIENT
Start: 2023-04-16 | End: 2023-04-16

## 2023-04-16 RX ORDER — POTASSIUM CHLORIDE 20 MEQ
10 PACKET (EA) ORAL
Refills: 0 | Status: COMPLETED | OUTPATIENT
Start: 2023-04-16 | End: 2023-04-16

## 2023-04-16 RX ORDER — ACETAMINOPHEN 500 MG
650 TABLET ORAL EVERY 6 HOURS
Refills: 0 | Status: DISCONTINUED | OUTPATIENT
Start: 2023-04-16 | End: 2023-04-22

## 2023-04-16 RX ORDER — DEXTROSE 50 % IN WATER 50 %
12.5 SYRINGE (ML) INTRAVENOUS ONCE
Refills: 0 | Status: DISCONTINUED | OUTPATIENT
Start: 2023-04-16 | End: 2023-04-22

## 2023-04-16 RX ORDER — POTASSIUM CHLORIDE 20 MEQ
10 PACKET (EA) ORAL
Refills: 0 | Status: DISCONTINUED | OUTPATIENT
Start: 2023-04-16 | End: 2023-04-16

## 2023-04-16 RX ORDER — GLUCAGON INJECTION, SOLUTION 0.5 MG/.1ML
1 INJECTION, SOLUTION SUBCUTANEOUS ONCE
Refills: 0 | Status: DISCONTINUED | OUTPATIENT
Start: 2023-04-16 | End: 2023-04-22

## 2023-04-16 RX ADMIN — NALOXONE HYDROCHLORIDE 0.4 MILLIGRAM(S): 4 SPRAY NASAL at 14:46

## 2023-04-16 RX ADMIN — Medication 80 MILLIGRAM(S): at 08:13

## 2023-04-16 RX ADMIN — Medication 100 MILLIEQUIVALENT(S): at 16:04

## 2023-04-16 RX ADMIN — Medication 100 MILLIEQUIVALENT(S): at 18:57

## 2023-04-16 RX ADMIN — Medication 100 MILLIEQUIVALENT(S): at 17:57

## 2023-04-16 RX ADMIN — Medication 40 MILLIGRAM(S): at 16:04

## 2023-04-16 RX ADMIN — Medication 3 MILLILITER(S): at 02:13

## 2023-04-16 RX ADMIN — Medication 100 MILLIEQUIVALENT(S): at 20:52

## 2023-04-16 NOTE — ED ADULT NURSE NOTE - OBJECTIVE STATEMENT
pt to ED with complaints of SOB. pt pmh HTN, DM, BEATRICE. pt states she has been experiencing SOB since november with swelling of blle. pt states the SOB has worsened the last couple of days. pt denies chest pain/palpitations/dizziness. pt placed on CM. pt NSR with frequent PVC's. RR even and labored,  pt placed on 2L NC with improvement.

## 2023-04-16 NOTE — ED PROVIDER NOTE - OBJECTIVE STATEMENT
69 y/o female w/PMHx of HTN, HLD, DM, and CAD presenting with sob for the last 3-4 months and increased leg swelling in the LLE for the last month. Denies taking any medications for chronic health conditions.

## 2023-04-16 NOTE — ED ADULT NURSE REASSESSMENT NOTE - NS ED NURSE REASSESS COMMENT FT1
ICU at bedside, patient trialing off bipap on NC saturating 98%.  Pt is answering questions and following commands. Awaiting admit orders.

## 2023-04-16 NOTE — CONSULT NOTE ADULT - SUBJECTIVE AND OBJECTIVE BOX
[facility]                                              CARDIOLOGY AT [place] [address]                                             Telephone: [phone]. Fax:[phone]                                                       CARDIOLOGY CONSULTATION NOTE                                                                                             History obtained by: Patient and medical record  Community Cardiologist:    obtained: Yes [  ] No [ x ]  Reason for Consultation: Acute Decompensated HFrEF  Available out pt records reviewed: Yes [ x ] No [  ]    Chief complaint:    Patient is a 68y old  Female who presents with a chief complaint of heart failure (2023 14:44)      HPI:  67 y/o F w/ PMH of HFrEF (EF 25% in ), substance abuse, HTN, HLD, DMII came in c/o sob and leg swelling.  Unable to obtain hx from pt as pt is obtundant.  Hx obtained from records.  In the ED pt was noted to have increased wob and volume overloaded and was given IV diuresis and placed on AVAPs.  ICU was consulted and agreed w/ management and no MICU admission at this time.  Records show pts last TTE was from  and EF at that time noted to be 25%.   (2023 14:34)      CARDIAC TESTING   ECHO:    STRESS:    CATH:     ELECTROPHYSIOLOGY:     PAST MEDICAL HISTORY  HTN (hypertension)    Hyperlipidemia    Type 2 diabetes mellitus    Depression    Anxiety    Insomnia    Morbid obesity with BMI of 40.0-44.9, adult    BEATRICE (obstructive sleep apnea)    Osteoarthritis    Endometrial hyperplasia        PAST SURGICAL HISTORY  S/P  section    S/P shoulder surgery        SOCIAL HISTORY:  Denies smoking/alcohol/drugs  CIGARETTES:     ALCOHOL:  DRUGS:    FAMILY HISTORY:  No pertinent family history in first degree relatives      Family History of Cardiovascular Disease:  Yes [  ] No [  ]  Coronary Artery Disease in first degree relative: Yes [  ] No [  ]  Sudden Cardiac Death in First degree relative: Yes [  ] No [  ]    HOME MEDICATIONS:  albuterol 90 mcg/inh inhalation aerosol: 1 puff(s) inhaled every 4 hours, As needed, Shortness of Breath and/or Wheezing (19 Dec 2020 13:51)  polyethylene glycol 3350 oral powder for reconstitution: 17 gram(s) orally once a day (19 Dec 2020 13:51)      CURRENT CARDIAC MEDICATIONS:  furosemide   Injectable 40 milliGRAM(s) IV Push two times a day      CURRENT OTHER MEDICATIONS:  acetaminophen     Tablet .. 650 milliGRAM(s) Oral every 6 hours PRN Temp greater or equal to 38C (100.4F), Mild Pain (1 - 3)  melatonin 3 milliGRAM(s) Oral at bedtime PRN Insomnia  aluminum hydroxide/magnesium hydroxide/simethicone Suspension 30 milliLiter(s) Oral every 4 hours PRN Dyspepsia  dextrose 5%. 1000 milliLiter(s) (100 mL/Hr) IV Continuous <Continuous>  dextrose 5%. 1000 milliLiter(s) (50 mL/Hr) IV Continuous <Continuous>  dextrose 50% Injectable 25 Gram(s) IV Push once, Stop order after: 1 Doses  dextrose 50% Injectable 12.5 Gram(s) IV Push once, Stop order after: 1 Doses  dextrose 50% Injectable 25 Gram(s) IV Push once, Stop order after: 1 Doses  dextrose Oral Gel 15 Gram(s) Oral once, Stop order after: 1 Doses PRN Blood Glucose LESS THAN 70 milliGRAM(s)/deciliter  glucagon  Injectable 1 milliGRAM(s) IntraMuscular once, Stop order after: 1 Doses  insulin lispro (ADMELOG) corrective regimen sliding scale   SubCutaneous three times a day before meals  potassium chloride  10 mEq/100 mL IVPB 10 milliEquivalent(s) IV Intermittent every 1 hour, Stop order after: 3 Doses      ALLERGIES:   No Known Allergies      REVIEW OF SYMPTOMS:   CONSTITUTIONAL: No fever, no chills, no weight loss, no weight gain, no fatigue   ENMT:  No vertigo; No sinus or throat pain  NECK: No pain or stiffness  CARDIOVASCULAR: No chest pain, no dyspnea, no syncope/presyncope, no palpitations, no dizziness, no Orthopnea, no Paroxsymal nocturnal dyspnea  RESPIRATORY: no Shortness of breath, no cough, no wheezing  : No dysuria, no hematuria   GI: No dark color stool, no nausea, no diarrhea, no constipation, no abdominal pain   NEURO: No headache, no slurred speech   MUSCULOSKELETAL: No joint pain or swelling; No muscle, back, or extremity pain  PSYCH: No agitation, no anxiety.    ALL OTHER REVIEW OF SYSTEMS ARE NEGATIVE.    VITAL SIGNS:  T(C): 36.6 (23 @ 15:57), Max: 36.9 (23 @ 06:18)  T(F): 97.8 (23 @ 15:57), Max: 98.4 (04-16-23 @ 06:18)  HR: 75 (23 @ 15:57) (72 - 82)  BP: 148/77 (23 @ 15:57) (127/67 - 148/77)  RR: 26 (23 @ 15:57) (16 - 26)  SpO2: 99% (23 @ 15:57) (95% - 100%)    INTAKE AND OUTPUT:      @ 07:01  -   @ 16:46  --------------------------------------------------------  IN: 0 mL / OUT: 6000 mL / NET: -6000 mL        PHYSICAL EXAM:  Constitutional: Comfortable . No acute distress.   HEENT: Atraumatic and normocephalic , neck is supple . no JVD. No carotid bruit.  CNS: A&Ox3. No focal deficits.   Respiratory: CTAB, unlabored   Cardiovascular: RRR normal s1 s2. No murmur. No rubs or gallop.  Gastrointestinal: Soft, non-tender. +Bowel sounds.   Extremities: 2+ Peripheral Pulses, No clubbing, cyanosis, or edema  Psychiatric: Calm . no agitation.   Skin: Warm and dry, no ulcers on extremities     LABS:  ( 2023 08:52 )  Troponin T  0.03 ,  CPK  X    , CKMB  X    , BNP X        , ( 2023 03:10 )  Troponin T  0.03 ,  CPK  X    , CKMB  X    , BNP X                                  7.4    6.67  )-----------( 251      ( 2023 03:10 )             27.9         144  |  104  |  13.5  ----------------------------<  152<H>  3.1<L>   |  25.0  |  0.75    Ca    8.7      2023 08:52    TPro  6.5<L>  /  Alb  3.0<L>  /  TBili  1.7  /  DBili  x   /  AST  18  /  ALT  16  /  AlkPhos  398<H>        Urinalysis Basic - ( 2023 09:30 )    Color: Yellow / Appearance: Clear / S.005 / pH: x  Gluc: x / Ketone: Negative  / Bili: Negative / Urobili: Negative mg/dL   Blood: x / Protein: Negative / Nitrite: Negative   Leuk Esterase: Negative / RBC: 0-2 /HPF / WBC 0-2 /HPF   Sq Epi: x / Non Sq Epi: x / Bacteria: Occasional              INTERPRETATION OF TELEMETRY:     ECG:   Prior ECG: Yes [  ] No [  ]    RADIOLOGY & ADDITIONAL STUDIES:    X-ray:    CT scan:   MRI:   US:                                                [facility]                                              CARDIOLOGY AT [place] [address]                                             Telephone: [phone]. Fax:[phone]                                                       CARDIOLOGY CONSULTATION NOTE                                                                                             History obtained by: Patient and medical record  Community Cardiologist: Dr. Lazo at [facility]   obtained: Yes [  ] No [ x ]  Reason for Consultation: Acute Decompensated HFrEF  Available out pt records reviewed: Yes [ x ] No [  ]    Chief complaint:    Patient is a 68y old  Female who presents with a chief complaint of heart failure (2023 14:44)      HPI: Patient is a 67 y/o F with a PMHx of CAD s/p HERMES x 2 mLAD (2020, with residual 70% RCA disease), HFrEF (EF 25-30%), substance abuse, HTN, HLD, DMII, and BEATRICE who presented to the ED with complaints of shortness of breath and leg swelling. Patient upon my evaluation was very lethargic, refusing to answer questions, but arousable. Patient apparently hasn't been taking any of her medications for years, and never followed up after being diagnosed with HFrEF and CAD. As per chart, patient has been experiencing worsening dyspnea on exertion and leg swelling. Patient upon arrival was given Lasix 80mg IV and placed on AVAPs, weaned to nasal cannula. Patient evaluated by MICU, but does not require admission at this time. Patient does not admit to active cocaine use despite positive Utox, but does admit to not taking any medications including aspirin. Unable to obtain ROS due to mental status at this time.     CARDIAC TESTING   ECHO:  < from: TTE Echo Complete w/ Contrast w/ Doppler (20 @ 14:55) >  PHYSICIAN INTERPRETATION:  Left Ventricle: The left ventricular internal cavity size is normal.  Global LV systolic function was severely decreased. Left ventricular ejection fraction, by visual estimation, is 25 to 30%. Spectral Doppler shows pseudonormal pattern of left ventricular myocardial filling (Grade II diastolic dysfunction). Elevated mean left atrial pressure.  Right Ventricle: The right ventricular size is normal. RV systolic function is mildly reduced.  Left Atrium:Severely enlarged left atrium.  Right Atrium: The right atrium is normal in size.  Pericardium: There is no evidence of pericardial effusion.  Mitral Valve: The mitral valve is normal in structure. Mild thickening of the anterior and posterior mitralvalve leaflets. No evidence of mitral stenosis. Moderate mitral valve regurgitation is seen. The MR jet is centrally-directed.  Tricuspid Valve: Structurally normal tricuspid valve, with normal leaflet excursion. Mild tricuspid regurgitation is visualized. Adequate TR velocity was not obtained to accurately assess RVSP.  Aortic Valve: The aortic valve is trileaflet. Sclerotic aortic valve with normal opening. No evidence of aortic valve regurgitation is seen.  Pulmonic Valve: Mild to moderate pulmonic valve regurgitation.  Aorta: The aortic root is normal in size and structure.  Venous: The inferior vena cava was normal sized, with respiratory size variation greater than 50%.      Summary:   1. Left ventricular ejection fraction, by visual estimation, is 25 to 30%.   2. Severely decreased global left ventricular systolic function.   3. Spectral Doppler shows pseudonormal pattern of left ventricular myocardial filling (Grade II diastolic dysfunction). Elevated mean left atrial pressure.   4. Normal left ventricular internal cavity size.   5. There is mild concentric left ventricular hypertrophy.   6. Right ventricular size is normal Mildly reduced RV systolic function.   7. Severely enlarged left atrium.   8. The right atrium is normalin size.   9. Mild thickening of the anterior and posterior mitral valve leaflets.  10. Moderate mitral valve regurgitation.  11. Sclerotic aortic valve with normal opening.  12. Mild tricuspid regurgitation.  13. Mild to moderate pulmonic valve regurgitation.  14. There is no evidence of pericardial effusion.    MD Joelle Electronically signed on 2020 at 4:39:53 PM      < end of copied text >    STRESS:    CATH:   < from: Cardiac Cath Lab - Adult (20 @ 11:09) >  VENTRICLES: No LV gram was performed; however, a recent echocardiogram  demonstrated an EF of 25 %.  CORONARY VESSELS: The coronary circulation is right dominant.  LM:   --  LM: Normal.  LAD:   --  Mid LAD: There was a 80 % stenosis. The lesion was heavily  calcified.  CX:   --  Circumflex: Normal.  RCA:   --  Mid RCA: There was a 70 % stenosis. The lesion was moderately  calcified.  COMPLICATIONS: No complications occurred during the cath lab visit.  SUMMARY:  HEMODYNAMICS: Hemodynamic assessment demonstrates severely elevated LVEDP.  DIAGNOSTIC IMPRESSIONS: Rotational atherectomy and PCI performed to LAD  with 2 HERMES.  High LVEDP to start- patient with symptoms of CHF during procedure. RCA PCI  initially planned but aborted.  DIAGNOSTIC RECOMMENDATIONS: Aspirin and plavix.  IV diuresis.  Plan for outpatient PCI of RCA with rotational atherectomy.  INTERVENTIONAL IMPRESSIONS: Rotational atherectomy and PCI performed to LAD  with 2 HERMES.  High LVEDP to start- patient with symptoms of CHF during procedure. RCA PCI  initially planned but aborted.  INTERVENTIONAL RECOMMENDATIONS: Aspirin and plavix.  IV diuresis.  Plan for outpatient PCI of RCA with rotational atherectomy.  Prepared and signed by  Brooks Corea MD  Signed 2020 14:19:39    < end of copied text >    ELECTROPHYSIOLOGY:     PAST MEDICAL HISTORY  HTN (hypertension)    Hyperlipidemia    Type 2 diabetes mellitus    Depression    Anxiety    Insomnia    Morbid obesity with BMI of 40.0-44.9, adult    BEATRICE (obstructive sleep apnea)    Osteoarthritis    Endometrial hyperplasia        PAST SURGICAL HISTORY  S/P  section    S/P shoulder surgery        SOCIAL HISTORY:    CIGARETTES:   Previous smoker, unclear if actively smoking  ALCOHOL: Unknown  DRUGS: + Cocaine on Utox    FAMILY HISTORY:  No pertinent family history in first degree relatives      Family History of Cardiovascular Disease:  Yes [  ] No [  ]  Coronary Artery Disease in first degree relative: Yes [  ] No [  ]  Sudden Cardiac Death in First degree relative: Yes [  ] No [  ]    HOME MEDICATIONS:  albuterol 90 mcg/inh inhalation aerosol: 1 puff(s) inhaled every 4 hours, As needed, Shortness of Breath and/or Wheezing (19 Dec 2020 13:51)  polyethylene glycol 3350 oral powder for reconstitution: 17 gram(s) orally once a day (19 Dec 2020 13:51)      CURRENT CARDIAC MEDICATIONS:  furosemide   Injectable 40 milliGRAM(s) IV Push two times a day      CURRENT OTHER MEDICATIONS:  acetaminophen     Tablet .. 650 milliGRAM(s) Oral every 6 hours PRN Temp greater or equal to 38C (100.4F), Mild Pain (1 - 3)  melatonin 3 milliGRAM(s) Oral at bedtime PRN Insomnia  aluminum hydroxide/magnesium hydroxide/simethicone Suspension 30 milliLiter(s) Oral every 4 hours PRN Dyspepsia  dextrose 5%. 1000 milliLiter(s) (100 mL/Hr) IV Continuous <Continuous>  dextrose 5%. 1000 milliLiter(s) (50 mL/Hr) IV Continuous <Continuous>  dextrose 50% Injectable 25 Gram(s) IV Push once, Stop order after: 1 Doses  dextrose 50% Injectable 12.5 Gram(s) IV Push once, Stop order after: 1 Doses  dextrose 50% Injectable 25 Gram(s) IV Push once, Stop order after: 1 Doses  dextrose Oral Gel 15 Gram(s) Oral once, Stop order after: 1 Doses PRN Blood Glucose LESS THAN 70 milliGRAM(s)/deciliter  glucagon  Injectable 1 milliGRAM(s) IntraMuscular once, Stop order after: 1 Doses  insulin lispro (ADMELOG) corrective regimen sliding scale   SubCutaneous three times a day before meals  potassium chloride  10 mEq/100 mL IVPB 10 milliEquivalent(s) IV Intermittent every 1 hour, Stop order after: 3 Doses      ALLERGIES:   No Known Allergies      REVIEW OF SYMPTOMS: Unable to obtain     VITAL SIGNS:  T(C): 36.6 (23 @ 15:57), Max: 36.9 (23 @ 06:18)  T(F): 97.8 (23 @ 15:57), Max: 98.4 (23 @ 06:18)  HR: 75 (23 @ 15:57) (72 - 82)  BP: 148/77 (23 @ 15:57) (127/67 - 148/77)  RR: 26 (23 @ 15:57) (16 - 26)  SpO2: 99% (23 @ 15:57) (95% - 100%)    INTAKE AND OUTPUT:      @ 07:01  -   @ 16:46  --------------------------------------------------------  IN: 0 mL / OUT: 6000 mL / NET: -6000 mL        PHYSICAL EXAM:  Constitutional: Lethargic  HEENT: Atraumatic and normocephalic , neck is supple . no JVD. No carotid bruit.  CNS: A&Ox1, no focal neurological deficits  Respiratory: Bilateral rales   Cardiovascular: RRR normal s1 s2. No murmur. No rubs or gallop.  Gastrointestinal: Soft, non-tender. +Bowel sounds.   Extremities: 2+ Peripheral Pulses, No clubbing, cyanosis, 3+ b/l LE edema  Psychiatric: Calm . no agitation.   Skin: Warm and dry, no ulcers on extremities     LABS:  ( 2023 08:52 )  Troponin T  0.03 ,  CPK  X    , CKMB  X    , BNP X        , ( 2023 03:10 )  Troponin T  0.03 ,  CPK  X    , CKMB  X    , BNP X                                  7.4    6.67  )-----------( 251      ( 2023 03:10 )             27.9     04-    144  |  104  |  13.5  ----------------------------<  152<H>  3.1<L>   |  25.0  |  0.75    Ca    8.7      2023 08:52    TPro  6.5<L>  /  Alb  3.0<L>  /  TBili  1.7  /  DBili  x   /  AST  18  /  ALT  16  /  AlkPhos  398<H>  -      Urinalysis Basic - ( 2023 09:30 )    Color: Yellow / Appearance: Clear / S.005 / pH: x  Gluc: x / Ketone: Negative  / Bili: Negative / Urobili: Negative mg/dL   Blood: x / Protein: Negative / Nitrite: Negative   Leuk Esterase: Negative / RBC: 0-2 /HPF / WBC 0-2 /HPF   Sq Epi: x / Non Sq Epi: x / Bacteria: Occasional              INTERPRETATION OF TELEMETRY:     ECG: SR, PVCs, LVH with repolarization abnormalities  Prior ECG: Yes [ x ] No [  ]    RADIOLOGY & ADDITIONAL STUDIES:    X-ray:    < from: Xray Chest 1 View- PORTABLE-Urgent (23 @ 02:22) >    INTERPRETATION:  AP chest on 2023 at 1:57 AM. Patient is short   of breath.    Heart enlargement again noted.    On 2022 there was a small right base process.    This is increased. There is now atelectasis at the right base with small   amount of fluid and bandlike atelectasis slightly above.    IMPRESSION: Increasing right base findings. Heart enlargement.    < end of copied text >    CT scan:   < from: CT Angio Chest PE Protocol w/ IV Cont (23 @ 08:04) >  FINDINGS:  CTA: The study is technically adequate with a good contrast bolus to the   pulmonary arteries. No central,lobar, or segmental pulmonary embolism.   Suboptimal assessment of the subsegmental branches due to poor contrast   opacification. The main pulmonary artery is dilated measuring 3.5 cm.    There are atherosclerotic calcifications in the thoracic aorta.    Cardiac: The heart is enlarged. No pericardial effusion. Extensive   coronary artery calcifications.    Lungs/Airways/Pleura: There is a small loculated right pleural effusion   with subjacent atelectasis. The central airways are patent.    Mediastinum/Lymph nodes: No thoracic adenopathy.    Upper Abdomen: Left adrenal gland thickening.    Bones and Soft Tissues: Mild diffuse subcutaneous edema. Bilateral breast   skin thickening. Multilevel degenerative disc disease.    IMPRESSION:  No evidence of pulmonary embolism.    Small loculated right pleural effusion with associated atelectasis.      < end of copied text >    MRI:   US:

## 2023-04-16 NOTE — ED ADULT NURSE REASSESSMENT NOTE - NS ED NURSE REASSESS COMMENT FT1
pt lying in stretcher. pt noted to be lethargic. pt awakens to verbal stimuli and then falls asleep. RR even, and labored. MD roth at bedside. pt with 2 L NC on. O2 sat 97%. pt placed on bipap by RT.

## 2023-04-16 NOTE — ED ADULT NURSE REASSESSMENT NOTE - NS ED NURSE REASSESS COMMENT FT1
Received reports at change of shift, as per overnight nurse Pt with progressively worsening respiratory distress requiring BIPAP. Upon assessment pt this am, pt was responsive only to noxious stimuli after multiple attempts. Pt was upgraded to critical care status.

## 2023-04-16 NOTE — CONSULT NOTE ADULT - ASSESSMENT
HFrEF exacerbation   Cocaine misuse  Acute hypoxic respiratory failure , initially on NIV , weaned off to nasal canula after diuresis and improvement in respiratory status  May admit to step down for acute on chronic HF exacerbation. Further diuresis and management as per Cardiology and primary team 
A/P:  Patient is a 67 y/o F with a PMHx of CAD s/p HERMES x 2 mLAD (12/2020, with residual 70% RCA disease), HFrEF (EF 25-30%), substance abuse, HTN, HLD, DMII, and BEATRICE who presented to the ED with complaints of shortness of breath and leg swelling. Patient upon my evaluation was very lethargic, refusing to answer questions, but arousable. Patient apparently hasn't been taking any of her medications for years, and never followed up after being diagnosed with HFrEF and CAD. As per chart, patient has been experiencing worsening dyspnea on exertion and leg swelling. Patient upon arrival was given Lasix 80mg IV and placed on AVAPs, weaned to nasal cannula. Patient evaluated by MICU, but does not require admission at this time. Patient does not admit to active cocaine use despite positive Utox, but does admit to not taking any medications including aspirin. Unable to obtain ROS due to mental status at this time.   Troponin negative x 2  pBNP [id number]

## 2023-04-16 NOTE — H&P ADULT - NSHPLABSRESULTS_GEN_ALL_CORE
7.4    6.67  )-----------( 251      ( 16 Apr 2023 03:10 )             27.9       04-16    144  |  104  |  13.5  ----------------------------<  152<H>  3.1<L>   |  25.0  |  0.75    Ca    8.7      16 Apr 2023 08:52    TPro  6.5<L>  /  Alb  3.0<L>  /  TBili  1.7  /  DBili  x   /  AST  18  /  ALT  16  /  AlkPhos  398<H>  04-16      Troponin T, Serum (04.16.23 @ 08:52)    Troponin T, Serum: 0.03: Reference Interval for Troponin T    Troponin T, Serum: 0.03: Reference Interval for Troponin T      Pro-Brain Natriuretic Peptide (04.16.23 @ 03:10)    Pro-Brain Natriuretic Peptide: 2827: NT-proBNP Interpretive comments: 7.4    6.67  )-----------( 251      ( 16 Apr 2023 03:10 )             27.9       04-16    144  |  104  |  13.5  ----------------------------<  152<H>  3.1<L>   |  25.0  |  0.75    Ca    8.7      16 Apr 2023 08:52    TPro  6.5<L>  /  Alb  3.0<L>  /  TBili  1.7  /  DBili  x   /  AST  18  /  ALT  16  /  AlkPhos  398<H>  04-16      Troponin T, Serum (04.16.23 @ 08:52)    Troponin T, Serum: 0.03: Reference Interval for Troponin T    Troponin T, Serum: 0.03: Reference Interval for Troponin T      Pro-Brain Natriuretic Peptide (04.16.23 @ 03:10)    Pro-Brain Natriuretic Peptide: 2827: NT-proBNP Interpretive comments:      < from: US Duplex Venous Lower Ext Complete, Bilateral (04.16.23 @ 09:37) >    IMPRESSION:  No evidence of deep venous thrombosis in either lower extremity.    < end of copied text >        < from: CT Angio Chest PE Protocol w/ IV Cont (04.16.23 @ 08:04) >    IMPRESSION:  No evidence of pulmonary embolism.    Small loculated right pleural effusion with associated atelectasis    < end of copied text >        < from: CT Head No Cont (04.16.23 @ 0) >    The brain  demonstrates small focal lesions consistent with   remote deep infarction. This includes within the right caudate nucleus   head and each thalamus. No cerebral cortical lesion is recognized.   Cerebral cortical gray-white matter differentiation is maintained.  No   acute cerebral cortical infarct is seen.  No intracranial hemorrhage is   found.  No mass effect is found in the brain.  Patchy indistinct   diminished attenuation within the deep cerebral hemispheric white matter   is typical for ischemic white matter disease. This appears most extensive   in the centrum semiovale and periatrial white matter. The anterior   temporal lobe structures appear symmetric and intact.  This includes the   hippocampal formations.  No focal lesion or differential atrophy of   temporal lobe structures is identified.  Throughout the cerebral   hemispheres no cortical dysplasia is recognized.    < end of copied text >

## 2023-04-16 NOTE — ED PROVIDER NOTE - PHYSICAL EXAMINATION
General: elderly ill appearing female but NAD  Head: NC, AT  EENT: EOMI, no scleral icterus  Cardiac: frequent PVCs, otherwise sinus rhythm, no apparent murmurs, 4+ pitting edema LLE, 2+ pitting edema RLE, posterior calf tenderness b/l  Respiratory: CTABL, no respiratory distress   Abdomen: soft, ND, NT, nonperitonitic  MSK/Vascular: full ROM, soft compartments, warm extremities, 2+ peripheral pulses b/l  Neuro: AAOx3, sensation to light touch intact  Psych: calm, cooperative

## 2023-04-16 NOTE — CONSULT NOTE ADULT - PROBLEM SELECTOR RECOMMENDATION 2
- Patient s/p HERMES x 2 to mLAD 12/2020, with residual 70% RCA disease.   - Patient non-adherent to any medications including aspirin.   - Patient now anemic with hemoglobin of 7.4. Hold aspirin until active bleed is ruled out.   - Transfuse to Hgb>8.0.   - Restart aspirin when H/H is stable and active bleed ruled out.   - Start losartan 25mg PO qday and lipitor 40mg PO QHS>   - Obtain TTE.   - Troponins negative.   - Defer PCI/HERMES until patient can show medication adherence due to risk of in-stent thrombosis.

## 2023-04-16 NOTE — H&P ADULT - ASSESSMENT
69 y/o F w/ PMH of HFrEF (EF 25% in 2020), substance abuse came in c/o sob and leg swelling.  Unable to obtain hx from pt as pt is obtundant.  Hx obtained from records.  In the ED pt was noted to have increased wob and volume overloaded and was given IV diuresis and placed on AVAPs.  ICU was consulted and agreed w/ management and no MICU admission at this time.  Records show pts last TTE was from 2020 and EF at that time noted to be 25%.  On exam pt  67 y/o F w/ PMH of HFrEF (EF 25% in 2020), substance abuse,  HTN, HLD, DMII,  came in c/o sob and leg swelling.  Unable to obtain hx from pt as pt is obtundent  Hx obtained from records.  In the ED pt was noted to have increased wob and volume overloaded and was given IV diuresis and placed on AVAPs.  ICU was consulted and agreed w/ management and no MICU admission at this time.  Records show pts last TTE was from 2020 and EF at that time noted to be 25%.  On exam pt notably volume overloaded.  Labs notable for Hb 7.4, mild hypokalemia, utox + for cocaine.  VBG w/ CO2 retention.  Pt will be admitted for acute decompensated HFrEF.      Admit to SDU      Acute decompensated HFrEF  - Significantly hypervolemic on exam   - Unclear if compliant w/ meds and likely precipitated by cocaine abuse    - Out pt meds reviewed and last prescribed meds in Jan 2023  - b/l LE doppler (-) for DVTs   - Trops (-)x2, EKG w/out ischemic changes  - pBNP 2827 and CXR  - CTA (-) for PE but noted to have a small loculated Rt pleural effusion w/ associated atelectasis   - Once able to tolerate po resume home meds except for BB given cocaine in utox   - Daily weights, strict I/O's and fluid restrict to 1L qd   - Maintain K>4 and Mg>2  - Monitor on telemetry and    - Incentive spirometry once pt more awake   - Cardio consulted by ED ([facility])      AMS   - Likely CO2 narcosis given CO2 retention   - s/p narcan given pinpoint pupils however no response   - Will place back on AVAPS and repeat VBG in 1 hr  - Utox +for cocaine   - CTH reporting remote small infarcts, which could be related to drug use but no acute findings   - Will also order ammonia, B12/folate, TSH and RPR  - Keep NPO for now   - If pt remains obtundent or VBG worsens will reconsult MICU       Microcytic anemia  - Baseline Hb 10 but currently 7.4  - Goal Hb>8 however Pt is notably hypervolemic therefor will hold off on transfusion until vol status improves further given risk of taco  - Will order iron panel   - Hemodynamically stable w/ no active bleeding noted   - Will repeat H/H and if drops further will consider CTA to assess for occult bleed   - Slight schistocytes noted on smear; will repeat smear to reassess   - SCDs for chemical VTE ppx   - Hold ASA and plavix for now and resume if Hb remains stable      Hypokalemia   - Will supplement w/ IV potassium given pt npo   - f/u repeat BMP in evening   - Maintain K>4       DM II  - Hold po meds while hospitalized   - No basal/bolus regimen for now as pt is npo  - ISS and hypoglycemic procol in place       VTE ppx: SCDs

## 2023-04-16 NOTE — ED ADULT NURSE REASSESSMENT NOTE - NS ED NURSE REASSESS COMMENT FT1
Patient with increased lethargy, unresponsive to voice.  Notified Dr. Jones who ordered narcan for possible fentanyl abuse. administered without improvement.  Notified Dr. Jones, patient to be placed back on bipap upon arrival to [facility].  Vital signs are stable.

## 2023-04-16 NOTE — CONSULT NOTE ADULT - SUBJECTIVE AND OBJECTIVE BOX
Patient is a 68y old  Female who presents with a chief complaint of heart failure (2023 14:34)      HPI:    67 y/o F w/ PMH of HFrEF (EF 25% in ), substance abuse, HTN, HLD, DM, presenting for  sob for the last 3-4 months and increased leg swelling . Denies any other associated symptoms . Patient with history of substance abuse . U/A was positive for Cocaine. Seems to be non compliant with her medication regimen      PAST MEDICAL & SURGICAL HISTORY:      HTN (hypertension)  Hyperlipidemia  Type 2 diabetes mellitus  Depression  Anxiety  Insomnia  Morbid obesity with BMI of 40.0-44.9, adult  BEATRICE (obstructive sleep apnea)  Osteoarthritis  Endometrial hyperplasia  S/P  section  x 1  S/P shoulder surgery  right 1985      MEDICATIONS:        aluminum hydroxide/magnesium hydroxide/simethicone Suspension 30 milliLiter(s) Oral every 4 hours PRN        furosemide   Injectable 40 milliGRAM(s) IV Push two times a day    Other Medications:  acetaminophen     Tablet .. 650 milliGRAM(s) Oral every 6 hours PRN  melatonin 3 milliGRAM(s) Oral at bedtime PRN  potassium chloride  10 mEq/100 mL IVPB 10 milliEquivalent(s) IV Intermittent every 1 hour      Allergies    No Known Allergies    Intolerances        Vital Signs Last 24 Hrs  T(C): 36.9 (2023 06:18), Max: 36.9 (2023 06:18)  T(F): 98.4 (2023 06:18), Max: 98.4 (2023 06:18)  HR: 80 (2023 11:33) (72 - 82)  BP: 140/63 (2023 10:30) (127/67 - 148/73)  BP(mean): --  RR: 26 (2023 10:30) (16 - 26)  SpO2: 97% (2023 11:33) (95% - 100%)    Parameters below as of 2023 10:30  Patient On (Oxygen Delivery Method): BiPAP/CPAP         @ 07:01  -   @ 14:51  --------------------------------------------------------  IN: 0 mL / OUT: 6000 mL / NET: -6000 mL          LABS:  ABG - ( 2023 06:00 )  pH, Arterial: 7.380 pH, Blood: x     /  pCO2: 52    /  pO2: 119   / HCO3: 31    / Base Excess: 5.7   /  SaO2: 98.2                CBC Full  -  ( 2023 03:10 )  WBC Count : 6.67 K/uL  RBC Count : 4.57 M/uL  Hemoglobin : 7.4 g/dL  Hematocrit : 27.9 %  Platelet Count - Automated : 251 K/uL  Mean Cell Volume : 61.1 fl  Mean Cell Hemoglobin : 16.2 pg  Mean Cell Hemoglobin Concentration : 26.5 gm/dL  Auto Neutrophil # : 4.68 K/uL  Auto Lymphocyte # : 1.03 K/uL  Auto Monocyte # : 0.81 K/uL  Auto Eosinophil # : 0.08 K/uL  Auto Basophil # : 0.04 K/uL  Auto Neutrophil % : 70.3 %  Auto Lymphocyte % : 15.4 %  Auto Monocyte % : 12.1 %  Auto Eosinophil % : 1.2 %  Auto Basophil % : 0.6 %        144  |  104  |  13.5  ----------------------------<  152<H>  3.1<L>   |  25.0  |  0.75    Ca    8.7      2023 08:52    TPro  6.5<L>  /  Alb  3.0<L>  /  TBili  1.7  /  DBili  x   /  AST  18  /  ALT  16  /  AlkPhos  398<H>            Urinalysis Basic - ( 2023 09:30 )    Color: Yellow / Appearance: Clear / S.005 / pH: x  Gluc: x / Ketone: Negative  / Bili: Negative / Urobili: Negative mg/dL   Blood: x / Protein: Negative / Nitrite: Negative   Leuk Esterase: Negative / RBC: 0-2 /HPF / WBC 0-2 /HPF   Sq Epi: x / Non Sq Epi: x / Bacteria: Occasional                  RADIOLOGY & ADDITIONAL STUDIES (The following images were personally reviewed): Patient is a 68y old  Female who presents with a chief complaint of heart failure (2023 14:34)      HPI:    67 y/o F w/ PMH of HFrEF (EF 25% in ), substance abuse, HTN, HLD, DM, presenting for  sob for the last 3-4 months and increased leg swelling . Denies any other associated symptoms . Patient with history of substance abuse . U/A was positive for Cocaine. Seems to be non compliant with her medication regimen      PAST MEDICAL & SURGICAL HISTORY:      HTN (hypertension)  Hyperlipidemia  Type 2 diabetes mellitus  Depression  Anxiety  Insomnia  Morbid obesity with BMI of 40.0-44.9, adult  BEATRICE (obstructive sleep apnea)  Osteoarthritis  Endometrial hyperplasia  S/P  section  x 1  S/P shoulder surgery  right 1985      MEDICATIONS:    aluminum hydroxide/magnesium hydroxide/simethicone Suspension 30 milliLiter(s) Oral every 4 hours PRN  furosemide   Injectable 40 milliGRAM(s) IV Push two times a day  Other Medications:  acetaminophen     Tablet .. 650 milliGRAM(s) Oral every 6 hours PRN  melatonin 3 milliGRAM(s) Oral at bedtime PRN  potassium chloride  10 mEq/100 mL IVPB 10 milliEquivalent(s) IV Intermittent every 1 hour      Allergies    No Known Allergies      Vital Signs Last 24 Hrs  T(C): 36.9 (2023 06:18), Max: 36.9 (2023 06:18)  T(F): 98.4 (2023 06:18), Max: 98.4 (2023 06:18)  HR: 80 (2023 11:33) (72 - 82)  BP: 140/63 (2023 10:30) (127/67 - 148/73)  BP(mean): --  RR: 26 (2023 10:30) (16 - 26)  SpO2: 97% (2023 11:33) (95% - 100%)    Parameters below as of 2023 10:30  Patient On (Oxygen Delivery Method): BiPAP/CPAP         @ 07:01  -   @ 14:51  --------------------------------------------------------  IN: 0 mL / OUT: 6000 mL / NET: -6000 mL      LABS:    ABG - ( 2023 06:00 )  pH, Arterial: 7.380 pH, Blood: x     /  pCO2: 52    /  pO2: 119   / HCO3: 31    / Base Excess: 5.7   /  SaO2: 98.2          CBC Full  -  ( 2023 03:10 )  WBC Count : 6.67 K/uL  RBC Count : 4.57 M/uL  Hemoglobin : 7.4 g/dL  Hematocrit : 27.9 %  Platelet Count - Automated : 251 K/uL  Mean Cell Volume : 61.1 fl  Mean Cell Hemoglobin : 16.2 pg  Mean Cell Hemoglobin Concentration : 26.5 gm/dL  Auto Neutrophil # : 4.68 K/uL  Auto Lymphocyte # : 1.03 K/uL  Auto Monocyte # : 0.81 K/uL  Auto Eosinophil # : 0.08 K/uL  Auto Basophil # : 0.04 K/uL  Auto Neutrophil % : 70.3 %  Auto Lymphocyte % : 15.4 %  Auto Monocyte % : 12.1 %  Auto Eosinophil % : 1.2 %  Auto Basophil % : 0.6 %        144  |  104  |  13.5  ----------------------------<  152<H>  3.1<L>   |  25.0  |  0.75    Ca    8.7      2023 08:52    TPro  6.5<L>  /  Alb  3.0<L>  /  TBili  1.7  /  DBili  x   /  AST  18  /  ALT  16  /  AlkPhos  398<H>            Urinalysis Basic - ( 2023 09:30 )    Color: Yellow / Appearance: Clear / S.005 / pH: x  Gluc: x / Ketone: Negative  / Bili: Negative / Urobili: Negative mg/dL   Blood: x / Protein: Negative / Nitrite: Negative   Leuk Esterase: Negative / RBC: 0-2 /HPF / WBC 0-2 /HPF   Sq Epi: x / Non Sq Epi: x / Bacteria: Occasional

## 2023-04-16 NOTE — ED PROVIDER NOTE - ATTENDING CONTRIBUTION TO CARE
Presentation c/w decompensated HF on AVAPs for WOB though blood gases acceptable. Signed out to oncoming provider pending CTA to r/o PE or pneumonia. Given lasix empirically for diuresis.

## 2023-04-16 NOTE — ED ADULT NURSE REASSESSMENT NOTE - NS ED NURSE REASSESS COMMENT FT1
Report received from offgoing RN, charting as noted. Assumed care for patient and 0715.  Patient is unresponsive to voice, head is slumped to the side. Repositioned, transported to CT on cardiac monitor. Once repositioned patient began responding to voice. maintaining saturation 100% on bipap. patient with sinus rhythm with PACs. Patient placed on Primafit, linens changed, administered Lasix per order Report received from offgoing RN, charting as noted. Assumed care for patient and 0715.  Patient is unresponsive to voice, head is slumped to the side. Repositioned, transported to CT on cardiac monitor. Once repositioned patient began responding to voice. maintaining saturation 100% on bipap. patient with sinus rhythm with PACs. Patient with upper and lower extremity 2+ pitting edema.  Patient placed on Primafit, linens changed, administered Lasix per order

## 2023-04-16 NOTE — H&P ADULT - HISTORY OF PRESENT ILLNESS
67 y/o F w/ PMH of HFrEF (EF 25% in 2020), substance abuse  67 y/o F w/ PMH of HFrEF (EF 25% in 2020), substance abuse came in c/o sob and leg swelling.  Unable to obtain hx from pt as pt is obtundant.  Hx obtained from records.  In the ED pt was noted to have increased wob and volume overloaded and was given IV diuresis and placed on AVAPs.  ICU was consulted and agreed w/ management and no MICU admission at this time.  Records show pts last TTE was from 2020 and EF at that time noted to be 25%.   69 y/o F w/ PMH of HFrEF (EF 25% in 2020), substance abuse, HTN, HLD, DMII came in c/o sob and leg swelling.  Unable to obtain hx from pt as pt is obtundant.  Hx obtained from records.  In the ED pt was noted to have increased wob and volume overloaded and was given IV diuresis and placed on AVAPs.  ICU was consulted and agreed w/ management and no MICU admission at this time.  Records show pts last TTE was from 2020 and EF at that time noted to be 25%.

## 2023-04-16 NOTE — ED PROVIDER NOTE - PROGRESS NOTE DETAILS
Radha: Pt requires CT imaging with IV contrast to rule out PE. Pt unable to provide consent for contrast due to AMS. No known contraindications to contrast, will proceed with study out of medical necessity. Lionel: Able to reach son Rock on the phone, states her mental status has been waning for the last 2-3 days alongside the increased leg swelling and difficulty breathing. States she was recently hospitalized and intubated, although when asked to elaborate pt stated he needed to call back. Marilin Hagan MD Attending Physician- patient signed out to me at shift change for worsening sob, CTA pending for PE. patient on AVAPS, feels improved but still tachypneic, lethargic but arousable to verbal stimuli, pulling tidal volumes 300s with some machine triggered breaths. repeat labs with no evidence of respiratory acidosis. discussed ct with radiology, small to moderate pleural effusion on the right, no evidence of PE, +enlarged heart, aorta unremarkable. patient diuresing, on 4th L per NANY Wilkins. likely chf exacerbation, MICU consulted, will see patient. Marilin Hagan MD Attending Physician- continuing to diuresis, transitioned to NC with improved tachypnea. seen by MICU, recommended tele. discussed with hospitalist Dr sena. hgb noted to be 7.4, cardiac patient should transfuse however complicated given fluid overload. hospitalist dr sena to see, will consult cardiology

## 2023-04-16 NOTE — CONSULT NOTE ADULT - NS ATTEND AMEND GEN_ALL_CORE FT
Patient seen and examined and agree with above plan   Patient is non complaint with medications and has a hx of CAD and HFref, non ischemic cardiomyopathy   Acute on Chronic decompensated HFrEF EF previously 25-30% from ischemia vs. cocaine induced, Dyspnea likely due to fluid overload and compounded by anemia.   - Transfuse to Hgb>8.0, anemia workup per primary team.   - GDMT for HFrEF Beta Blocker: Hold at this time as patient is decompensated, and active cocaine user, can consider Coreg   RAAS Inhibitor: Start losartan 25mg PO qday.   MRA: Start prior to discharge.   Diuretic: Continue Lasix 40mg IV BID.\Patient cocaine positive     CAD  s/p HERMES x 2 to mLAD 12/2020, with residual 70% RCA disease.   non compliant with any medications including aspirin.   Hgb of 7.4, no evidence of active bleeding but will hold ASA until source of anemia is ascertained; Transfuse to Hgb>8.0.   Restart aspirin when H/H is stable and active bleed ruled out.   Obtain TTE to assess LV function and valvulopathy   Troponins negative with EKG showing evidence of LVH with frequent PVCs   Defer PCI/HERMES until patient can show medication adherence due to risk of in-stent thrombosis.    Zaida Quintanilla D.O. [facility]  Cardiology/Vascular Cardiology -[facility]   Telephone # [phone]

## 2023-04-16 NOTE — H&P ADULT - NSHPPHYSICALEXAM_GEN_ALL_CORE
GENERAL: pt examined bedside, rapid shallow breathing on supplemental O2   HEENT: NC/AT, moist oral mucosa, pale conjunctiva, sclera nonicteric, poor dentition   RESPIRATORY: Normal respiratory effort; CTA b/l, no wheezing, rhonchi, rales  CARDIOVASCULAR: RRR, normal S1 and S2, +murmurs, +JVD  ABDOMEN: soft, obese, +anasarca, +pitting along flanks, NT/ND, +bowel sounds  MSK: No joint deformities, edema, erythema  EXTREMITIES: No cynaosis, +clubbing, 3+LE edema extending up to b/l hips, pulses 1+ bilaterally  PSYCH: obtundent   NEUROLOGY: obtundent, rousable to noxious stim  SKIN: No rashes or no palpable lesions

## 2023-04-16 NOTE — ED PROVIDER NOTE - CLINICAL SUMMARY MEDICAL DECISION MAKING FREE TEXT BOX
patient received at sign out. labs notable for anemia, elevated pro bnp. no evidence of hypercarbia on vbg. patient transitioned off avaps to nc with improved tachypnea and continued diuresis s/p 80 mg iv lasix. seen by micu, feel ok for tele. patient endorsed to hospitalist on tele for acute decompensated heart failure

## 2023-04-16 NOTE — ED ADULT NURSE NOTE - CHPI ED NUR SYMPTOMS NEG
no body aches/no chest pain/no chills/no cough/no diaphoresis/no edema/no fever/no headache/no wheezing

## 2023-04-17 LAB
A1C WITH ESTIMATED AVERAGE GLUCOSE RESULT: 7.2 % — HIGH (ref 4–5.6)
ALBUMIN SERPL ELPH-MCNC: 2.8 G/DL — LOW (ref 3.3–5.2)
ALBUMIN SERPL ELPH-MCNC: 2.9 G/DL — LOW (ref 3.3–5.2)
ALBUMIN SERPL ELPH-MCNC: 3.5 G/DL — SIGNIFICANT CHANGE UP (ref 3.3–5.2)
ALP SERPL-CCNC: 308 U/L — HIGH (ref 40–120)
ALP SERPL-CCNC: 333 U/L — HIGH (ref 40–120)
ALP SERPL-CCNC: 397 U/L — HIGH (ref 40–120)
ALT FLD-CCNC: 12 U/L — SIGNIFICANT CHANGE UP
ALT FLD-CCNC: 14 U/L — SIGNIFICANT CHANGE UP
ALT FLD-CCNC: 15 U/L — SIGNIFICANT CHANGE UP
AMMONIA BLD-MCNC: 25 UMOL/L — SIGNIFICANT CHANGE UP (ref 11–55)
AMMONIA BLD-MCNC: 26 UMOL/L — SIGNIFICANT CHANGE UP (ref 11–55)
ANION GAP SERPL CALC-SCNC: 10 MMOL/L — SIGNIFICANT CHANGE UP (ref 5–17)
ANION GAP SERPL CALC-SCNC: 12 MMOL/L — SIGNIFICANT CHANGE UP (ref 5–17)
ANION GAP SERPL CALC-SCNC: 12 MMOL/L — SIGNIFICANT CHANGE UP (ref 5–17)
AST SERPL-CCNC: 11 U/L — SIGNIFICANT CHANGE UP
AST SERPL-CCNC: 18 U/L — SIGNIFICANT CHANGE UP
AST SERPL-CCNC: 18 U/L — SIGNIFICANT CHANGE UP
BASE EXCESS BLDA CALC-SCNC: 7.7 MMOL/L — HIGH (ref -2–3)
BASE EXCESS BLDV CALC-SCNC: 5.7 MMOL/L — HIGH (ref -2–3)
BASOPHILS # BLD AUTO: 0.02 K/UL — SIGNIFICANT CHANGE UP (ref 0–0.2)
BASOPHILS # BLD AUTO: 0.02 K/UL — SIGNIFICANT CHANGE UP (ref 0–0.2)
BASOPHILS NFR BLD AUTO: 0.3 % — SIGNIFICANT CHANGE UP (ref 0–2)
BASOPHILS NFR BLD AUTO: 0.3 % — SIGNIFICANT CHANGE UP (ref 0–2)
BILIRUB SERPL-MCNC: 1.2 MG/DL — SIGNIFICANT CHANGE UP (ref 0.4–2)
BILIRUB SERPL-MCNC: 1.4 MG/DL — SIGNIFICANT CHANGE UP (ref 0.4–2)
BILIRUB SERPL-MCNC: 1.6 MG/DL — SIGNIFICANT CHANGE UP (ref 0.4–2)
BLD GP AB SCN SERPL QL: SIGNIFICANT CHANGE UP
BLOOD GAS COMMENTS ARTERIAL: SIGNIFICANT CHANGE UP
BLOOD GAS COMMENTS, VENOUS: SIGNIFICANT CHANGE UP
BUN SERPL-MCNC: 10.4 MG/DL — SIGNIFICANT CHANGE UP (ref 8–20)
BUN SERPL-MCNC: 11 MG/DL — SIGNIFICANT CHANGE UP (ref 8–20)
BUN SERPL-MCNC: 9.9 MG/DL — SIGNIFICANT CHANGE UP (ref 8–20)
CA-I SERPL-SCNC: 1.13 MMOL/L — LOW (ref 1.15–1.33)
CALCIUM SERPL-MCNC: 8.2 MG/DL — LOW (ref 8.4–10.5)
CALCIUM SERPL-MCNC: 8.2 MG/DL — LOW (ref 8.4–10.5)
CALCIUM SERPL-MCNC: 8.6 MG/DL — SIGNIFICANT CHANGE UP (ref 8.4–10.5)
CHLORIDE BLDV-SCNC: 101 MMOL/L — SIGNIFICANT CHANGE UP (ref 96–108)
CHLORIDE SERPL-SCNC: 101 MMOL/L — SIGNIFICANT CHANGE UP (ref 96–108)
CHLORIDE SERPL-SCNC: 105 MMOL/L — SIGNIFICANT CHANGE UP (ref 96–108)
CHLORIDE SERPL-SCNC: 99 MMOL/L — SIGNIFICANT CHANGE UP (ref 96–108)
CK SERPL-CCNC: 35 U/L — SIGNIFICANT CHANGE UP (ref 25–170)
CO2 SERPL-SCNC: 31 MMOL/L — HIGH (ref 22–29)
CO2 SERPL-SCNC: 31 MMOL/L — HIGH (ref 22–29)
CO2 SERPL-SCNC: 33 MMOL/L — HIGH (ref 22–29)
CREAT SERPL-MCNC: 0.65 MG/DL — SIGNIFICANT CHANGE UP (ref 0.5–1.3)
CREAT SERPL-MCNC: 0.66 MG/DL — SIGNIFICANT CHANGE UP (ref 0.5–1.3)
CREAT SERPL-MCNC: 0.75 MG/DL — SIGNIFICANT CHANGE UP (ref 0.5–1.3)
CULTURE RESULTS: SIGNIFICANT CHANGE UP
EGFR: 86 ML/MIN/1.73M2 — SIGNIFICANT CHANGE UP
EGFR: 94 ML/MIN/1.73M2 — SIGNIFICANT CHANGE UP
EGFR: 95 ML/MIN/1.73M2 — SIGNIFICANT CHANGE UP
EOSINOPHIL # BLD AUTO: 0.01 K/UL — SIGNIFICANT CHANGE UP (ref 0–0.5)
EOSINOPHIL # BLD AUTO: 0.01 K/UL — SIGNIFICANT CHANGE UP (ref 0–0.5)
EOSINOPHIL NFR BLD AUTO: 0.1 % — SIGNIFICANT CHANGE UP (ref 0–6)
EOSINOPHIL NFR BLD AUTO: 0.2 % — SIGNIFICANT CHANGE UP (ref 0–6)
ESTIMATED AVERAGE GLUCOSE: 160 MG/DL — HIGH (ref 68–114)
FERRITIN SERPL-MCNC: 22 NG/ML — SIGNIFICANT CHANGE UP (ref 15–150)
FOLATE SERPL-MCNC: 11.5 NG/ML — SIGNIFICANT CHANGE UP
GAS PNL BLDA: SIGNIFICANT CHANGE UP
GAS PNL BLDV: 139 MMOL/L — SIGNIFICANT CHANGE UP (ref 136–145)
GAS PNL BLDV: SIGNIFICANT CHANGE UP
GAS PNL BLDV: SIGNIFICANT CHANGE UP
GLUCOSE BLDC GLUCOMTR-MCNC: 172 MG/DL — HIGH (ref 70–99)
GLUCOSE BLDC GLUCOMTR-MCNC: 199 MG/DL — HIGH (ref 70–99)
GLUCOSE BLDC GLUCOMTR-MCNC: 218 MG/DL — HIGH (ref 70–99)
GLUCOSE BLDV-MCNC: 177 MG/DL — HIGH (ref 70–99)
GLUCOSE SERPL-MCNC: 160 MG/DL — HIGH (ref 70–99)
GLUCOSE SERPL-MCNC: 168 MG/DL — HIGH (ref 70–99)
GLUCOSE SERPL-MCNC: 186 MG/DL — HIGH (ref 70–99)
HCO3 BLDA-SCNC: 33 MMOL/L — HIGH (ref 21–28)
HCO3 BLDV-SCNC: 33 MMOL/L — HIGH (ref 22–29)
HCT VFR BLD CALC: 27.3 % — LOW (ref 34.5–45)
HCT VFR BLD CALC: 27.8 % — LOW (ref 34.5–45)
HCT VFR BLDA CALC: 23 % — SIGNIFICANT CHANGE UP
HGB BLD CALC-MCNC: 7.7 G/DL — LOW (ref 11.7–16.1)
HGB BLD-MCNC: 7.2 G/DL — LOW (ref 11.5–15.5)
HGB BLD-MCNC: 7.5 G/DL — LOW (ref 11.5–15.5)
IMM GRANULOCYTES NFR BLD AUTO: 0.4 % — SIGNIFICANT CHANGE UP (ref 0–0.9)
IMM GRANULOCYTES NFR BLD AUTO: 0.6 % — SIGNIFICANT CHANGE UP (ref 0–0.9)
IRON SATN MFR SERPL: 16 UG/DL — LOW (ref 37–145)
IRON SATN MFR SERPL: 4 % — LOW (ref 14–50)
LACTATE BLDV-MCNC: 6.9 MMOL/L — CRITICAL HIGH (ref 0.5–2)
LACTATE SERPL-SCNC: 3.4 MMOL/L — HIGH (ref 0.5–2)
LYMPHOCYTES # BLD AUTO: 0.4 K/UL — LOW (ref 1–3.3)
LYMPHOCYTES # BLD AUTO: 0.49 K/UL — LOW (ref 1–3.3)
LYMPHOCYTES # BLD AUTO: 5 % — LOW (ref 13–44)
LYMPHOCYTES # BLD AUTO: 7.5 % — LOW (ref 13–44)
MAGNESIUM SERPL-MCNC: 1.6 MG/DL — SIGNIFICANT CHANGE UP (ref 1.6–2.6)
MAGNESIUM SERPL-MCNC: 1.6 MG/DL — SIGNIFICANT CHANGE UP (ref 1.6–2.6)
MAGNESIUM SERPL-MCNC: 1.7 MG/DL — SIGNIFICANT CHANGE UP (ref 1.6–2.6)
MCHC RBC-ENTMCNC: 16.5 PG — LOW (ref 27–34)
MCHC RBC-ENTMCNC: 16.5 PG — LOW (ref 27–34)
MCHC RBC-ENTMCNC: 26.4 GM/DL — LOW (ref 32–36)
MCHC RBC-ENTMCNC: 27 GM/DL — LOW (ref 32–36)
MCV RBC AUTO: 61.1 FL — LOW (ref 80–100)
MCV RBC AUTO: 62.5 FL — LOW (ref 80–100)
MONOCYTES # BLD AUTO: 0.59 K/UL — SIGNIFICANT CHANGE UP (ref 0–0.9)
MONOCYTES # BLD AUTO: 0.73 K/UL — SIGNIFICANT CHANGE UP (ref 0–0.9)
MONOCYTES NFR BLD AUTO: 9 % — SIGNIFICANT CHANGE UP (ref 2–14)
MONOCYTES NFR BLD AUTO: 9.2 % — SIGNIFICANT CHANGE UP (ref 2–14)
MRSA PCR RESULT.: SIGNIFICANT CHANGE UP
NEUTROPHILS # BLD AUTO: 5.41 K/UL — SIGNIFICANT CHANGE UP (ref 1.8–7.4)
NEUTROPHILS # BLD AUTO: 6.76 K/UL — SIGNIFICANT CHANGE UP (ref 1.8–7.4)
NEUTROPHILS NFR BLD AUTO: 82.4 % — HIGH (ref 43–77)
NEUTROPHILS NFR BLD AUTO: 85 % — HIGH (ref 43–77)
PCO2 BLDA: 57 MMHG — HIGH (ref 32–45)
PCO2 BLDV: 75 MMHG — HIGH (ref 39–42)
PH BLDA: 7.37 — SIGNIFICANT CHANGE UP (ref 7.35–7.45)
PH BLDV: 7.25 — LOW (ref 7.32–7.43)
PHOSPHATE SERPL-MCNC: 3.3 MG/DL — SIGNIFICANT CHANGE UP (ref 2.4–4.7)
PHOSPHATE SERPL-MCNC: 3.6 MG/DL — SIGNIFICANT CHANGE UP (ref 2.4–4.7)
PLATELET # BLD AUTO: 246 K/UL — SIGNIFICANT CHANGE UP (ref 150–400)
PLATELET # BLD AUTO: 247 K/UL — SIGNIFICANT CHANGE UP (ref 150–400)
PO2 BLDA: 102 MMHG — SIGNIFICANT CHANGE UP (ref 83–108)
PO2 BLDV: <42 MMHG — SIGNIFICANT CHANGE UP (ref 25–45)
POTASSIUM BLDV-SCNC: 3.2 MMOL/L — LOW (ref 3.5–5.1)
POTASSIUM SERPL-MCNC: 3.3 MMOL/L — LOW (ref 3.5–5.3)
POTASSIUM SERPL-MCNC: 3.8 MMOL/L — SIGNIFICANT CHANGE UP (ref 3.5–5.3)
POTASSIUM SERPL-MCNC: 4.1 MMOL/L — SIGNIFICANT CHANGE UP (ref 3.5–5.3)
POTASSIUM SERPL-SCNC: 3.3 MMOL/L — LOW (ref 3.5–5.3)
POTASSIUM SERPL-SCNC: 3.8 MMOL/L — SIGNIFICANT CHANGE UP (ref 3.5–5.3)
POTASSIUM SERPL-SCNC: 4.1 MMOL/L — SIGNIFICANT CHANGE UP (ref 3.5–5.3)
PROT SERPL-MCNC: 6 G/DL — LOW (ref 6.6–8.7)
PROT SERPL-MCNC: 6.2 G/DL — LOW (ref 6.6–8.7)
PROT SERPL-MCNC: 7.1 G/DL — SIGNIFICANT CHANGE UP (ref 6.6–8.7)
RBC # BLD: 4.37 M/UL — SIGNIFICANT CHANGE UP (ref 3.8–5.2)
RBC # BLD: 4.55 M/UL — SIGNIFICANT CHANGE UP (ref 3.8–5.2)
RBC # FLD: 25.2 % — HIGH (ref 10.3–14.5)
RBC # FLD: 25.4 % — HIGH (ref 10.3–14.5)
S AUREUS DNA NOSE QL NAA+PROBE: SIGNIFICANT CHANGE UP
SAO2 % BLDA: 99.7 % — HIGH (ref 94–98)
SAO2 % BLDV: 53.1 % — SIGNIFICANT CHANGE UP
SODIUM SERPL-SCNC: 144 MMOL/L — SIGNIFICANT CHANGE UP (ref 135–145)
SODIUM SERPL-SCNC: 144 MMOL/L — SIGNIFICANT CHANGE UP (ref 135–145)
SODIUM SERPL-SCNC: 146 MMOL/L — HIGH (ref 135–145)
SPECIMEN SOURCE: SIGNIFICANT CHANGE UP
TIBC SERPL-MCNC: 438 UG/DL — HIGH (ref 220–430)
TRANSFERRIN SERPL-MCNC: 306 MG/DL — SIGNIFICANT CHANGE UP (ref 192–382)
TSH SERPL-MCNC: 0.64 UIU/ML — SIGNIFICANT CHANGE UP (ref 0.27–4.2)
VIT B12 SERPL-MCNC: 776 PG/ML — SIGNIFICANT CHANGE UP (ref 232–1245)
WBC # BLD: 6.56 K/UL — SIGNIFICANT CHANGE UP (ref 3.8–10.5)
WBC # BLD: 7.95 K/UL — SIGNIFICANT CHANGE UP (ref 3.8–10.5)
WBC # FLD AUTO: 6.56 K/UL — SIGNIFICANT CHANGE UP (ref 3.8–10.5)
WBC # FLD AUTO: 7.95 K/UL — SIGNIFICANT CHANGE UP (ref 3.8–10.5)

## 2023-04-17 PROCEDURE — 99291 CRITICAL CARE FIRST HOUR: CPT

## 2023-04-17 PROCEDURE — 71045 X-RAY EXAM CHEST 1 VIEW: CPT | Mod: 26

## 2023-04-17 RX ORDER — CHLORHEXIDINE GLUCONATE 213 G/1000ML
1 SOLUTION TOPICAL
Refills: 0 | Status: DISCONTINUED | OUTPATIENT
Start: 2023-04-17 | End: 2023-04-22

## 2023-04-17 RX ORDER — NALOXONE HYDROCHLORIDE 4 MG/.1ML
0.4 SPRAY NASAL ONCE
Refills: 0 | Status: DISCONTINUED | OUTPATIENT
Start: 2023-04-17 | End: 2023-04-18

## 2023-04-17 RX ORDER — MAGNESIUM SULFATE 500 MG/ML
1 VIAL (ML) INJECTION ONCE
Refills: 0 | Status: COMPLETED | OUTPATIENT
Start: 2023-04-17 | End: 2023-04-17

## 2023-04-17 RX ORDER — NALOXONE HYDROCHLORIDE 4 MG/.1ML
0.4 SPRAY NASAL ONCE
Refills: 0 | Status: COMPLETED | OUTPATIENT
Start: 2023-04-17 | End: 2023-04-17

## 2023-04-17 RX ORDER — NALOXONE HYDROCHLORIDE 4 MG/.1ML
0.4 SPRAY NASAL
Qty: 2 | Refills: 0 | Status: DISCONTINUED | OUTPATIENT
Start: 2023-04-17 | End: 2023-04-18

## 2023-04-17 RX ORDER — CALCIUM GLUCONATE 100 MG/ML
1 VIAL (ML) INTRAVENOUS ONCE
Refills: 0 | Status: COMPLETED | OUTPATIENT
Start: 2023-04-17 | End: 2023-04-17

## 2023-04-17 RX ORDER — POTASSIUM CHLORIDE 20 MEQ
10 PACKET (EA) ORAL
Refills: 0 | Status: COMPLETED | OUTPATIENT
Start: 2023-04-17 | End: 2023-04-17

## 2023-04-17 RX ORDER — HALOPERIDOL DECANOATE 100 MG/ML
5 INJECTION INTRAMUSCULAR EVERY 6 HOURS
Refills: 0 | Status: DISCONTINUED | OUTPATIENT
Start: 2023-04-17 | End: 2023-04-22

## 2023-04-17 RX ADMIN — Medication 100 MILLIEQUIVALENT(S): at 02:27

## 2023-04-17 RX ADMIN — NALOXONE HYDROCHLORIDE 0.4 MILLIGRAM(S): 4 SPRAY NASAL at 08:32

## 2023-04-17 RX ADMIN — CHLORHEXIDINE GLUCONATE 1 APPLICATION(S): 213 SOLUTION TOPICAL at 09:49

## 2023-04-17 RX ADMIN — NALOXONE HYDROCHLORIDE 100 MG/HR: 4 SPRAY NASAL at 09:49

## 2023-04-17 RX ADMIN — Medication 2: at 16:46

## 2023-04-17 RX ADMIN — Medication 1: at 08:27

## 2023-04-17 RX ADMIN — Medication 40 MILLIGRAM(S): at 09:57

## 2023-04-17 RX ADMIN — Medication 100 MILLIEQUIVALENT(S): at 00:51

## 2023-04-17 RX ADMIN — Medication 40 MILLIGRAM(S): at 16:33

## 2023-04-17 RX ADMIN — Medication 100 MILLIEQUIVALENT(S): at 03:27

## 2023-04-17 RX ADMIN — HALOPERIDOL DECANOATE 5 MILLIGRAM(S): 100 INJECTION INTRAMUSCULAR at 13:54

## 2023-04-17 RX ADMIN — Medication 100 GRAM(S): at 00:50

## 2023-04-17 RX ADMIN — Medication 100 GRAM(S): at 19:57

## 2023-04-17 NOTE — PATIENT PROFILE ADULT - FALL HARM RISK - HARM RISK INTERVENTIONS

## 2023-04-17 NOTE — PROGRESS NOTE ADULT - SUBJECTIVE AND OBJECTIVE BOX
[facility]                                                         CARDIOLOGY AT [facility]                                                                  [address], [place] [address]                                                         Telephone: [phone]. Fax:[phone]                                                                             PROGRESS NOTE    Reason for follow up: AoC HFrEF  Update: encephalopathic s/p 2 RRTs, tx to ICU for narcan gtt      Review of symptoms:   not responding     Vitals:  T(C): 36.4 (04-17-23 @ 04:53), Max: 36.6 (04-16-23 @ 15:57)  HR: 82 (04-17-23 @ 09:30) (67 - 91)  BP: 104/79 (04-17-23 @ 09:30) (92/58 - 148/77)  RR: 32 (04-17-23 @ 09:30) (13 - 32)  SpO2: 87% (04-17-23 @ 09:30) (87% - 100%)  Wt(kg): --  I&O's Summary    16 Apr 2023 07:01  -  17 Apr 2023 07:00  --------------------------------------------------------  IN: 0 mL / OUT: 8000 mL / NET: -8000 mL    17 Apr 2023 07:01  -  17 Apr 2023 10:06  --------------------------------------------------------  IN: 100 mL / OUT: 0 mL / NET: 100 mL          PHYSICAL EXAM:      Neurologic: A & O x 0, no gross focal deficits.  Cardiovascular: RRR S1 S2, No murmur, no rubs/gallops. No JVD  Respiratory: Lungs clear to auscultation, unlabored   Gastrointestinal:  Soft, Non-tender, + BS  Lower Extremities: 2+ Peripheral Pulses, No clubbing, cyanosis, or edema  Psychiatry: Patient is calm. No agitation.   Skin: warm and dry.    CURRENT CARDIAC MEDICATIONS:  furosemide   Injectable 40 milliGRAM(s) IV Push two times a day      CURRENT OTHER MEDICATIONS:  acetaminophen     Tablet .. 650 milliGRAM(s) Oral every 6 hours PRN Temp greater or equal to 38C (100.4F), Mild Pain (1 - 3)  melatonin 3 milliGRAM(s) Oral at bedtime PRN Insomnia  aluminum hydroxide/magnesium hydroxide/simethicone Suspension 30 milliLiter(s) Oral every 4 hours PRN Dyspepsia  dextrose 50% Injectable 25 Gram(s) IV Push once, Stop order after: 1 Doses  dextrose 50% Injectable 12.5 Gram(s) IV Push once, Stop order after: 1 Doses  dextrose 50% Injectable 25 Gram(s) IV Push once, Stop order after: 1 Doses  dextrose Oral Gel 15 Gram(s) Oral once, Stop order after: 1 Doses PRN Blood Glucose LESS THAN 70 milliGRAM(s)/deciliter  glucagon  Injectable 1 milliGRAM(s) IntraMuscular once, Stop order after: 1 Doses  insulin lispro (ADMELOG) corrective regimen sliding scale   SubCutaneous three times a day before meals  chlorhexidine 2% Cloths 1 Application(s) Topical <User Schedule>  chlorhexidine 4% Liquid 1 Application(s) Topical <User Schedule>  dextrose 5%. 1000 milliLiter(s) (100 mL/Hr) IV Continuous <Continuous>  dextrose 5%. 1000 milliLiter(s) (50 mL/Hr) IV Continuous <Continuous>      LABS:	 	  ( 16 Apr 2023 08:52 )  Troponin T  0.03 ,  CPK  X    , CKMB  X    , BNP X        , ( 16 Apr 2023 03:10 )  Troponin T  0.03 ,  CPK  X    , CKMB  X    , BNP X                                  7.2    6.56  )-----------( 247      ( 17 Apr 2023 06:20 )             27.3     04-17    146<H>  |  105  |  10.4  ----------------------------<  168<H>  3.8   |  31.0<H>  |  0.65    Ca    8.2<L>      17 Apr 2023 06:20  Phos  3.3     04-17  Mg     1.7     04-17    TPro  6.0<L>  /  Alb  2.8<L>  /  TBili  1.2  /  DBili  x   /  AST  11  /  ALT  12  /  AlkPhos  308<H>  04-17      Lipid Profile:   HgA1c:   TSH: Thyroid Stimulating Hormone, Serum: 0.64 uIU/mL      TELEMETRY: SR, PVCs, blocked PACs      DIAGNOSTIC TESTING:  [ ] Echocardiogram:   < from: TTE Echo Complete w/o Contrast w/ Doppler (04.16.23 @ 15:09) >  PHYSICIAN INTERPRETATION:  Left Ventricle: The left ventricular internal cavity size is normal.  Left ventricular ejection fraction, by visual estimation, is20 to 25%.   Spectral Doppler shows pseudonormal pattern of left ventricular   myocardial filling (Grade II diastolic dysfunction). Findings are   consistent with dilated cardiomyopathy.  Right Ventricle: The right ventricular size is mildly enlarged. RV   systolic function is mildly reduced.  Left Atrium: Moderately enlarged left atrium.  Right Atrium: Mildly enlarged right atrium.  Pericardium: There is no evidence of pericardial effusion.  Mitral Valve: Mild thickening of the anterior and posterior mitral valve   leaflets. Moderate mitral valve regurgitation is seen.  Tricuspid Valve: The tricuspid valve is normal in structure. Mild   tricuspid regurgitation is visualized. Estimated pulmonary artery   systolic pressure is 48.6 mmHg assuming a right atrial pressure of 15   mmHg, which is consistent with mild pulmonary hypertension.  Aortic Valve: The aortic valve is trileaflet. Sclerotic aortic valve with   normal opening. No evidence of aortic valve regurgitation is seen.  Pulmonic Valve: The pulmonic valve was not well visualized. Trace   pulmonic valve regurgitation.  Aorta: The aortic root is normal in size and structure.  Pulmonary Artery: The pulmonary artery is of normal size and origin.  Venous: The inferior vena cava is 2.2cm. The inferior vena cava was   dilated, with respiratory size variation less than 50%.  In comparison to the previous echocardiogram(s): Prior examinations are   available and were reviewed for comparison purposes. Compared to the   prior TTE studyfrom 12/14/20, overall no significant change.    < end of copied text >    [ ]  Catheterization:  < from: Cardiac Cath Lab - Adult (12.18.20 @ 11:09) >  CORONARY VESSELS: The coronary circulation is right dominant.  LM:   --  LM: Normal.  LAD:   --  Mid LAD: There was a 80 % stenosis. The lesion was heavily  calcified.  CX:   --  Circumflex: Normal.  RCA:   --  Mid RCA: There was a 70 % stenosis. The lesion was moderately  calcified.    < end of copied text >

## 2023-04-17 NOTE — RAPID RESPONSE TEAM SUMMARY - NSOTHERINTERVENTIONSRRT_GEN_ALL_CORE
AM labs pending from 4am  MICU consulted for possible Narcan drip and assistance with AVAPS settings AM labs pending from 4am  AB  MICU consulted for possible Narcan drip and assistance with AVAPS settings

## 2023-04-17 NOTE — RAPID RESPONSE TEAM SUMMARY - NSSITUATIONBACKGROUNDRRT_GEN_ALL_CORE
69 y/o F w/ PMH of HFrEF (EF 25% in 2020), substance abuse, HTN, HLD, DMII, came in c/o sob and leg swelling. In the ED, pt was diuressed with IV lasix and placed on AVAPS.  ICU was consulted and agreed w/ management and no MICU admission at that time. Additionally, admitting labs revealed utox + for cocaine.     RRT called this morning for unresponsiveness. Pt seen and examined at bedside. Pt laying in bed, obtunded. Non responsive to painful or verbal stimuli. Vitals stable (/73, , Spo2 97% on 3L NC) and . Given pt with known substance abuse hx, narcan was given twice with improvement in mentation. After second dose of narcan, patients eyes opened and tracking.     PE prior to narcan:   GENERAL: Obtunded  HEENT: Pinpoint pupils, non reactive to light  RESPIRATORY: Normal respiratory effort; CTA b/l, no wheezing, rhonchi, rales  CARDIOVASCULAR: RRR, normal S1 and S2, +murmurs, +JVD  ABDOMEN: soft, obese, +anasarca, +pitting along flanks, NT/ND, +bowel sounds  EXTREMITIES: No cynaosis, +clubbing, 3+LE edema extending up to b/l hips, pulses 1+ bilaterally  PSYCH: Obtunded  NEUROLOGY: Obtunded 67 y/o F w/ PMH of HFrEF (EF 25% in 2020), substance abuse, HTN, HLD, DMII, came in c/o sob and leg swelling. Admitted for decompensated heart failure. In the ED, pt was diuresed with IV lasix and placed on AVAPS.  ICU was consulted and agreed w/ management and no MICU admission at that time. Additionally, admitting labs revealed utox + for cocaine.     RRT called this morning for unresponsiveness. Pt seen and examined at bedside. Pt laying in bed, obtunded. Non responsive to painful or verbal stimuli. Vitals stable (/73, , Spo2 97% on 3L NC) and . Given pt with known substance abuse hx, narcan was given twice with improvement in mentation. After second dose of narcan, patients eyes opened and tracking.     PE prior to Narcan:   GENERAL: Obtunded  HEENT: Pinpoint pupils, non reactive to light  RESPIRATORY: Normal respiratory effort; CTA b/l, no wheezing, rhonchi, rales  CARDIOVASCULAR: RRR, normal S1 and S2, +murmurs, +JVD  ABDOMEN: soft, obese, +anasarca, +pitting along flanks, NT/ND, +bowel sounds  EXTREMITIES: No cynaosis, +clubbing, 3+LE edema extending up to b/l hips, pulses 1+ bilaterally  PSYCH: Obtunded  NEUROLOGY: Obtunded 67 y/o F w/ PMH of HFrEF (EF 25% in 2020), substance abuse, HTN, HLD, DMII, came in c/o sob and leg swelling. Admitted for CHFAE. Initially on NIV , weaned off to nasal canula after diuresis and improvement in respiratory status. ICU was consulted and agreed w/ management and no MICU admission at that time. Additionally, admitting labs revealed utox + for cocaine.     RRT called this morning for unresponsiveness. Pt seen and examined at bedside. Pt laying in bed, obtunded. Non responsive to painful or verbal stimuli. Vitals stable (/73, , Spo2 97% on 3L NC) and . Given pt with known substance abuse hx, narcan was given twice with improvement in mentation. After second dose of narcan, patients eyes opened and tracking.     PE prior to Narcan:   GENERAL: Obtunded  HEENT: Pinpoint pupils, non reactive to light  RESPIRATORY: Normal respiratory effort; CTA b/l, no wheezing, rhonchi, rales  CARDIOVASCULAR: RRR, normal S1 and S2, +murmurs, +JVD  ABDOMEN: soft, obese, +anasarca, +pitting along flanks, NT/ND, +bowel sounds  EXTREMITIES: No cynaosis, +clubbing, 3+LE edema extending up to b/l hips, pulses 1+ bilaterally  PSYCH: Obtunded  NEUROLOGY: Obtunded

## 2023-04-17 NOTE — CONSULT NOTE ADULT - SUBJECTIVE AND OBJECTIVE BOX
Assessment/plan:    68-year-old -American female with past medical history of coronary artery disease s/p drug-eluting stent to mid LAD in 2020 with heart failure with reduced EF polysubstance abuse essential hypertension dyslipidemia type 2 diabetes mellitus obstructive sleep apnea who initially presented to [facility] ED on April 16 with shortness of breath and leg edema being admitted for acute heart failure with reduced EF exacerbation with cocaine abuse with waxing and waning encephalopathy requiring multiple doses of Narcan with transient improvement in mentation, currently being admitted to medical ICU for acute toxic/metabolic encephalopathy with acute hypoxic hypercapnic respiratory failure with lactic acidosis with underlying heart failure (with reduced EF) exacerbation with  clinical concerns for  long-acting synthetic opioid abuse     will admit the patient to medical ICU   CT head on admission without acute bleeding with possible subacute to chronic lacunar infarct, every 4 neurochecks, fall aspiration and seizure precaution, Narcan infusion to be titrated for improvement in mentation; repeat utox with serum fentanyl levels  Currently oxygenating and ventilating fine on AVAPS: EPAP 6, FiO2 30%, tidal volume 450 with pressure ranging from 20-30 with backup rate of 14 getting minute ventilation of approximately 20 to 25 L/min during my assessment, ABG performed pending results,  will obtain chest x-ray  We will continue with IV Lasix twice daily, holding off of aspirin or other antiplatelet for now; TTE noted with biventricular failure with dilated cardiomyopathy with EF ranging from 20 to 25%, will trend lactic acid and urine output and planning to keep the patient normotensive as possible  No overt bleeding, will trend H&H, will need anemia work-up   n.p.o. while poor mentation   has been getting potassium repleted, will keep close monitoring   strict I's and O's with frequent bladder scan  Insulin sliding scale        _________________________________    Chief complaint:    Shortness of breath and leg edema on admission    HPI/Hospital course:  68-year-old -American female with past medical history of essential hypertension diabetes dyslipidemia coronary artery disease heart failure with reduced EF substance abuse presented to [facility] on April 16 with complaints of worsening dyspnea and leg swelling was found to have cocaine on U tox was being treated for heart failure with reduced EF with concerns for pinpoint pupil and decreased responsiveness responding to Narcan intermittently and succumbing back to decreased responsiveness over last 24 hours and rapid response was called earlier this morning for significantly decreased responsiveness and responding to 2 doses of Narcan but requiring AVAPS with respiratory distress and medical ICU called and during my assessment patient was obtunded barely responding to deep  noxious stimuli with respiratory distress with respiratory rate in low 30s, hemodynamically stable and maintaining saturation okay on AVAPS with improved response in her mentation after receiving another  dose of Narcan.  Patient being transitioned to medical ICU care for Narcan infusion to be titrated for further response and.  I performed ABG pending results.    Review of systems:  Could not obtain ROS due to underlying mentation    Past medical/surgical history: HTN, DM-2, HLD, HfrEF, Substance abuse     Could not obtain ROS due to underlying mentationFamily history:    Social history:Could not obtain    Allergies: No Known Allergies    Medications: Physical exMEDICATIONS  (STANDING):  chlorhexidine 2% Cloths 1 Application(s) Topical <User Schedule>  chlorhexidine 4% Liquid 1 Application(s) Topical <User Schedule>  dextrose 5%. 1000 milliLiter(s) (100 mL/Hr) IV Continuous <Continuous>  dextrose 5%. 1000 milliLiter(s) (50 mL/Hr) IV Continuous <Continuous>  dextrose 50% Injectable 25 Gram(s) IV Push once  dextrose 50% Injectable 12.5 Gram(s) IV Push once  dextrose 50% Injectable 25 Gram(s) IV Push once  furosemide   Injectable 40 milliGRAM(s) IV Push two times a day  glucagon  Injectable 1 milliGRAM(s) IntraMuscular once  insulin lispro (ADMELOG) corrective regimen sliding scale   SubCutaneous three times a day before meals  naloxone Infusion 0.4 mG/Hr (100 mL/Hr) IV Continuous <Continuous>  naloxone Injectable 0.4 milliGRAM(s) IV Push once  MEDICATIONS  (PRN):  acetaminophen     Tablet .. 650 milliGRAM(s) Oral every 6 hours PRN Temp greater or equal to 38C (100.4F), Mild Pain (1 - 3)  aluminum hydroxide/magnesium hydroxide/simethicone Suspension 30 milliLiter(s) Oral every 4 hours PRN Dyspepsia  dextrose Oral Gel 15 Gram(s) Oral once PRN Blood Glucose LESS THAN 70 milliGRAM(s)/deciliter  melatonin 3 milliGRAM(s) Oral at bedtime PRN Insomnia  am:    ICU Vital Signs Last 24 Hrs  T(C): 36.4 (17 Apr 2023 04:53), Max: 36.6 (16 Apr 2023 15:57)  T(F): 97.5 (17 Apr 2023 04:53), Max: 97.8 (16 Apr 2023 15:57)  HR: 75 (17 Apr 2023 08:00) (67 - 80)  BP: 92/58 (17 Apr 2023 08:00) (92/58 - 148/77)  RR: 13 (17 Apr 2023 08:00) (13 - 26)  SpO2: 98% (17 Apr 2023 08:35) (92% - 100%)  O2 Parameters below as of 17 Apr 2023 08:00  Patient On (Oxygen Delivery Method): BiPAP/CPAP        68-year-old -American female, morbidly obese, unkempt, obtunded, initially not withdrawing to noxious stimuli, after Narcan bolus opened eyes spontaneously moving all 4 extremities not following commands with pupils 2 mm reacting to light on initial exam bilaterally, air entry equal bilaterally decreased at the bases, regular rate and rhythm, nontender distended abdomen, 2+ pitting edema bilateral lower extremities, no obvious acute rashes, external urinary catheter, respiratory distress initially with respiratory rate in low 30s using accessory muscles of breathing, no obvious lymphadenopathy      Labs: reviewed   Imaging:  < from: CT Head No Cont (04.16.23 @ 08:03) >  IMPRESSION:    1. Small basal ganglia and thalamic lacunar infarctions    2. Ischemic white matter disease and atrophy typical for age    3. Intracranial atherosclerosis    4. Anterior temporal lobe structures appear intact    5. Patient motion limited scan    < from: CT Angio Chest PE Protocol w/ IV Cont (04.16.23 @ 08:04) >  No evidence of pulmonary embolism.    Small loculated right pleural effusion with associated atelectasis.    < end of copied text >  < from: US Duplex Venous Lower Ext Complete, Bilateral (04.16.23 @ 09:37) >  No evidence of deep venous thrombosis in either lower extremity.      < end of copied text >          Cultures/pathology:None    Plan of care discussed with patient and primary team  Thank you for your consult.   Please call us back with any worsening clinical status or with concerns & questions.   We will Sign Off for now.     Leonel Ruth MD   Division of Critical Care Medicine  Department of Medicine   [facility]   Cell [phone]     I have personally provided 70 minutes of critical care time excluding time spent on separate procedures

## 2023-04-17 NOTE — PROGRESS NOTE ADULT - PROBLEM SELECTOR PLAN 2
-Hx 12/2022 mLAD x2, RCA 70%  -GDMT: DAPT on hold 2/2 anemia, ARB / BB on hold 2/2 fluctuating SBPs. Can start statin

## 2023-04-17 NOTE — PROGRESS NOTE ADULT - PROBLEM SELECTOR PLAN 1
-Known ischemia in 2020  -EF 20-25%, DDII, PASP 48.6  -BNP 2827  -CTA with small loculated pleural effusion  -Complete AMS, doesn't appear overtly overloaded  -?Dumping urine. Net neg 8L in 24hrs  -Bicarb improving  -GDMT: lasix

## 2023-04-17 NOTE — PROGRESS NOTE ADULT - ASSESSMENT
67 y/o F with a PMHx of CAD s/p HERMES x 2 mLAD (12/2020, with residual 70% RCA disease), HFrEF (EF 25-30%), substance abuse, HTN, HLD, DMII, and BEATRICE who presented to the ED with complaints of shortness of breath and leg swelling. Had lethargy 4/16. Now AMS episodes s/p 2 RRTs now upgraded to ICU for narcan gtt

## 2023-04-18 LAB
ALBUMIN SERPL ELPH-MCNC: 2.9 G/DL — LOW (ref 3.3–5.2)
ALP SERPL-CCNC: 295 U/L — HIGH (ref 40–120)
ALT FLD-CCNC: 11 U/L — SIGNIFICANT CHANGE UP
ANION GAP SERPL CALC-SCNC: 9 MMOL/L — SIGNIFICANT CHANGE UP (ref 5–17)
ANISOCYTOSIS BLD QL: SLIGHT — SIGNIFICANT CHANGE UP
AST SERPL-CCNC: 12 U/L — SIGNIFICANT CHANGE UP
BASOPHILS # BLD AUTO: 0 K/UL — SIGNIFICANT CHANGE UP (ref 0–0.2)
BASOPHILS NFR BLD AUTO: 0 % — SIGNIFICANT CHANGE UP (ref 0–2)
BILIRUB SERPL-MCNC: 1.3 MG/DL — SIGNIFICANT CHANGE UP (ref 0.4–2)
BUN SERPL-MCNC: 9.8 MG/DL — SIGNIFICANT CHANGE UP (ref 8–20)
CALCIUM SERPL-MCNC: 8.7 MG/DL — SIGNIFICANT CHANGE UP (ref 8.4–10.5)
CHLORIDE SERPL-SCNC: 96 MMOL/L — SIGNIFICANT CHANGE UP (ref 96–108)
CO2 SERPL-SCNC: 38 MMOL/L — HIGH (ref 22–29)
CREAT SERPL-MCNC: 0.69 MG/DL — SIGNIFICANT CHANGE UP (ref 0.5–1.3)
EGFR: 93 ML/MIN/1.73M2 — SIGNIFICANT CHANGE UP
ELLIPTOCYTES BLD QL SMEAR: SLIGHT — SIGNIFICANT CHANGE UP
EOSINOPHIL # BLD AUTO: 0 K/UL — SIGNIFICANT CHANGE UP (ref 0–0.5)
EOSINOPHIL NFR BLD AUTO: 0 % — SIGNIFICANT CHANGE UP (ref 0–6)
GIANT PLATELETS BLD QL SMEAR: PRESENT — SIGNIFICANT CHANGE UP
GLUCOSE BLDC GLUCOMTR-MCNC: 106 MG/DL — HIGH (ref 70–99)
GLUCOSE BLDC GLUCOMTR-MCNC: 127 MG/DL — HIGH (ref 70–99)
GLUCOSE BLDC GLUCOMTR-MCNC: 78 MG/DL — SIGNIFICANT CHANGE UP (ref 70–99)
GLUCOSE SERPL-MCNC: 78 MG/DL — SIGNIFICANT CHANGE UP (ref 70–99)
HCT VFR BLD CALC: 28.1 % — LOW (ref 34.5–45)
HGB BLD-MCNC: 7.7 G/DL — LOW (ref 11.5–15.5)
HYPOCHROMIA BLD QL: SLIGHT — SIGNIFICANT CHANGE UP
LYMPHOCYTES # BLD AUTO: 0.98 K/UL — LOW (ref 1–3.3)
LYMPHOCYTES # BLD AUTO: 11.4 % — LOW (ref 13–44)
MAGNESIUM SERPL-MCNC: 1.5 MG/DL — LOW (ref 1.6–2.6)
MAGNESIUM SERPL-MCNC: 1.8 MG/DL — SIGNIFICANT CHANGE UP (ref 1.6–2.6)
MANUAL SMEAR VERIFICATION: SIGNIFICANT CHANGE UP
MCHC RBC-ENTMCNC: 16.6 PG — LOW (ref 27–34)
MCHC RBC-ENTMCNC: 27.4 GM/DL — LOW (ref 32–36)
MCV RBC AUTO: 60.6 FL — LOW (ref 80–100)
MICROCYTES BLD QL: SLIGHT — SIGNIFICANT CHANGE UP
MONOCYTES # BLD AUTO: 0.22 K/UL — SIGNIFICANT CHANGE UP (ref 0–0.9)
MONOCYTES NFR BLD AUTO: 2.6 % — SIGNIFICANT CHANGE UP (ref 2–14)
NEUTROPHILS # BLD AUTO: 7.28 K/UL — SIGNIFICANT CHANGE UP (ref 1.8–7.4)
NEUTROPHILS NFR BLD AUTO: 85.1 % — HIGH (ref 43–77)
NRBC # BLD: 1 /100 — HIGH (ref 0–0)
OVALOCYTES BLD QL SMEAR: SLIGHT — SIGNIFICANT CHANGE UP
PHOSPHATE SERPL-MCNC: 2.8 MG/DL — SIGNIFICANT CHANGE UP (ref 2.4–4.7)
PLAT MORPH BLD: NORMAL — SIGNIFICANT CHANGE UP
PLATELET # BLD AUTO: 242 K/UL — SIGNIFICANT CHANGE UP (ref 150–400)
POIKILOCYTOSIS BLD QL AUTO: SIGNIFICANT CHANGE UP
POLYCHROMASIA BLD QL SMEAR: SLIGHT — SIGNIFICANT CHANGE UP
POTASSIUM SERPL-MCNC: 2.9 MMOL/L — CRITICAL LOW (ref 3.5–5.3)
POTASSIUM SERPL-MCNC: 3.5 MMOL/L — SIGNIFICANT CHANGE UP (ref 3.5–5.3)
POTASSIUM SERPL-SCNC: 2.9 MMOL/L — CRITICAL LOW (ref 3.5–5.3)
POTASSIUM SERPL-SCNC: 3.5 MMOL/L — SIGNIFICANT CHANGE UP (ref 3.5–5.3)
PROT SERPL-MCNC: 6.1 G/DL — LOW (ref 6.6–8.7)
RBC # BLD: 4.64 M/UL — SIGNIFICANT CHANGE UP (ref 3.8–5.2)
RBC # FLD: 25.4 % — HIGH (ref 10.3–14.5)
RBC BLD AUTO: ABNORMAL
SCHISTOCYTES BLD QL AUTO: SLIGHT — SIGNIFICANT CHANGE UP
SODIUM SERPL-SCNC: 143 MMOL/L — SIGNIFICANT CHANGE UP (ref 135–145)
TARGETS BLD QL SMEAR: SIGNIFICANT CHANGE UP
TSH SERPL-MCNC: 1.14 UIU/ML — SIGNIFICANT CHANGE UP (ref 0.27–4.2)
VARIANT LYMPHS # BLD: 0.9 % — SIGNIFICANT CHANGE UP (ref 0–6)
WBC # BLD: 8.56 K/UL — SIGNIFICANT CHANGE UP (ref 3.8–10.5)
WBC # FLD AUTO: 8.56 K/UL — SIGNIFICANT CHANGE UP (ref 3.8–10.5)

## 2023-04-18 PROCEDURE — 99291 CRITICAL CARE FIRST HOUR: CPT

## 2023-04-18 PROCEDURE — 93010 ELECTROCARDIOGRAM REPORT: CPT

## 2023-04-18 PROCEDURE — 99233 SBSQ HOSP IP/OBS HIGH 50: CPT

## 2023-04-18 RX ORDER — FERROUS SULFATE 325(65) MG
325 TABLET ORAL DAILY
Refills: 0 | Status: DISCONTINUED | OUTPATIENT
Start: 2023-04-18 | End: 2023-04-20

## 2023-04-18 RX ORDER — MAGNESIUM SULFATE 500 MG/ML
2 VIAL (ML) INJECTION ONCE
Refills: 0 | Status: COMPLETED | OUTPATIENT
Start: 2023-04-18 | End: 2023-04-18

## 2023-04-18 RX ORDER — ASPIRIN/CALCIUM CARB/MAGNESIUM 324 MG
81 TABLET ORAL DAILY
Refills: 0 | Status: DISCONTINUED | OUTPATIENT
Start: 2023-04-18 | End: 2023-04-22

## 2023-04-18 RX ORDER — POTASSIUM CHLORIDE 20 MEQ
10 PACKET (EA) ORAL
Refills: 0 | Status: COMPLETED | OUTPATIENT
Start: 2023-04-18 | End: 2023-04-18

## 2023-04-18 RX ORDER — POTASSIUM CHLORIDE 20 MEQ
40 PACKET (EA) ORAL ONCE
Refills: 0 | Status: COMPLETED | OUTPATIENT
Start: 2023-04-18 | End: 2023-04-18

## 2023-04-18 RX ORDER — CARVEDILOL PHOSPHATE 80 MG/1
3.12 CAPSULE, EXTENDED RELEASE ORAL EVERY 12 HOURS
Refills: 0 | Status: DISCONTINUED | OUTPATIENT
Start: 2023-04-18 | End: 2023-04-22

## 2023-04-18 RX ORDER — METOPROLOL TARTRATE 50 MG
5 TABLET ORAL ONCE
Refills: 0 | Status: COMPLETED | OUTPATIENT
Start: 2023-04-18 | End: 2023-04-18

## 2023-04-18 RX ORDER — ATORVASTATIN CALCIUM 80 MG/1
40 TABLET, FILM COATED ORAL AT BEDTIME
Refills: 0 | Status: DISCONTINUED | OUTPATIENT
Start: 2023-04-18 | End: 2023-04-22

## 2023-04-18 RX ORDER — METOPROLOL TARTRATE 50 MG
5 TABLET ORAL ONCE
Refills: 0 | Status: DISCONTINUED | OUTPATIENT
Start: 2023-04-18 | End: 2023-04-18

## 2023-04-18 RX ADMIN — Medication 81 MILLIGRAM(S): at 11:39

## 2023-04-18 RX ADMIN — Medication 325 MILLIGRAM(S): at 11:39

## 2023-04-18 RX ADMIN — HALOPERIDOL DECANOATE 5 MILLIGRAM(S): 100 INJECTION INTRAMUSCULAR at 08:09

## 2023-04-18 RX ADMIN — Medication 100 MILLIEQUIVALENT(S): at 04:56

## 2023-04-18 RX ADMIN — Medication 40 MILLIEQUIVALENT(S): at 05:44

## 2023-04-18 RX ADMIN — ATORVASTATIN CALCIUM 40 MILLIGRAM(S): 80 TABLET, FILM COATED ORAL at 21:41

## 2023-04-18 RX ADMIN — Medication 5 MILLIGRAM(S): at 05:42

## 2023-04-18 RX ADMIN — Medication 100 MILLIEQUIVALENT(S): at 06:45

## 2023-04-18 RX ADMIN — CARVEDILOL PHOSPHATE 3.12 MILLIGRAM(S): 80 CAPSULE, EXTENDED RELEASE ORAL at 10:10

## 2023-04-18 RX ADMIN — Medication 100 MILLIEQUIVALENT(S): at 05:43

## 2023-04-18 RX ADMIN — CARVEDILOL PHOSPHATE 3.12 MILLIGRAM(S): 80 CAPSULE, EXTENDED RELEASE ORAL at 17:48

## 2023-04-18 RX ADMIN — Medication 25 GRAM(S): at 08:06

## 2023-04-18 NOTE — PROGRESS NOTE ADULT - PROBLEM SELECTOR PLAN 2
-Hx 12/2022 mLAD x2, RCA 70%  -GDMT: DAPT on hold 2/2 anemia, ARB / BB on hold 2/2 fluctuating SBPs.   -Start statin. -Hx 12/2022 mLAD x2, RCA 70%  -GDMT: DAPT on hold 2/2 anemia, ARB on hold 2/2 fluctuating SBPs.   -Start statin. -Hx 12/2022 mLAD x2, RCA 70%  -GDMT: DAPT on hold 2/2 anemia, ARB on hold 2/2 fluctuating SBPs.   -Start statin.  -Can start ASA 81 QD if no objection from anemia standpoint

## 2023-04-18 NOTE — PROGRESS NOTE ADULT - SUBJECTIVE AND OBJECTIVE BOX
MICU DOWN GRADE NOTE      Patient is a 68y old  Female who presents with a chief complaint of heart failure (2023 01:53)      HPI: 69 y/o F w/ PMH of HFrEF (EF 25% in ), substance abuse, HTN, HLD, DMII came in c/o sob and leg swelling.  Unable to obtain hx from pt as pt is obtundant.  Hx obtained from records.  In the ED pt was noted to have increased wob and volume overloaded and was given IV diuresis and placed on AVAPs.  ICU was consulted and agreed w/ management and no MICU admission at this time.  Records show pts last TTE was from  and EF at that time noted to be 25%.      INTERVAL HPI/OVERNIGHT EVENTS:67yo F with PMHx of HFrED, substance abuse, HTN, DM admitted for decompensated HF requiring AVAPs course complicated by acute toxic metabolic encephalopathy requiring multiple doses Narcan with transient improvement then pt was RRT on  for worsening mentation/obtunded with worsening hypoxic respiratory failure upgraded to MICU for Narcan gtt clinical concerns for  long-acting synthetic opioid abuse. Pt seen and examined at bedside.         REVIEW OF SYSTEMS:  CONSTITUTIONAL: No fever, chills  HEENT:  No blurry vision No sinus or throat pain  NECK: No pain or stiffness  RESPIRATORY: No cough, wheezing, chills or hemoptysis; No shortness of breath  CARDIOVASCULAR: No chest pain, palpitations  GASTROINTESTINAL: No abdominal pain. No nausea, vomiting, or diarrhea  GENITOURINARY: No dysuria  NEUROLOGICAL: No HA, No focal weakness  SKIN: No itching, burning, rashes, or lesions   MUSCULOSKELETAL: No joint pain or swelling; No muscle, back, or extremity pain    MEDICATIONS:  acetaminophen     Tablet .. 650 milliGRAM(s) Oral every 6 hours PRN  aluminum hydroxide/magnesium hydroxide/simethicone Suspension 30 milliLiter(s) Oral every 4 hours PRN  chlorhexidine 2% Cloths 1 Application(s) Topical <User Schedule>  chlorhexidine 4% Liquid 1 Application(s) Topical <User Schedule>  dextrose 5%. 1000 milliLiter(s) IV Continuous <Continuous>  dextrose 5%. 1000 milliLiter(s) IV Continuous <Continuous>  dextrose 50% Injectable 25 Gram(s) IV Push once  dextrose 50% Injectable 25 Gram(s) IV Push once  dextrose 50% Injectable 12.5 Gram(s) IV Push once  dextrose Oral Gel 15 Gram(s) Oral once PRN  furosemide   Injectable 40 milliGRAM(s) IV Push two times a day  glucagon  Injectable 1 milliGRAM(s) IntraMuscular once  haloperidol    Injectable 5 milliGRAM(s) IV Push every 6 hours PRN  insulin lispro (ADMELOG) corrective regimen sliding scale   SubCutaneous three times a day before meals  magnesium sulfate  IVPB 2 Gram(s) IV Intermittent once  melatonin 3 milliGRAM(s) Oral at bedtime PRN  potassium chloride   Powder 40 milliEquivalent(s) Oral once  potassium chloride  10 mEq/100 mL IVPB 10 milliEquivalent(s) IV Intermittent every 1 hour      T(C): 36.3 (23 @ 04:00), Max: 37.1 (23 @ 23:45)  HR: 83 (23 @ 03:00) (66 - 96)  BP: 121/62 (23 @ 03:00) (90/56 - 149/69)  RR: 25 (23 @ 03:00) (12 - 32)  SpO2: 93% (23 @ 03:00) (87% - 100%)  Wt(kg): --Vital Signs Last 24 Hrs  T(C): 36.3 (2023 04:00), Max: 37.1 (2023 23:45)  T(F): 97.4 (2023 04:00), Max: 98.8 (2023 23:45)  HR: 83 (2023 03:00) (66 - 96)  BP: 121/62 (2023 03:00) (90/56 - 149/69)  BP(mean): 76 (2023 03:00) (65 - 111)  RR: 25 (2023 03:00) (12 - 32)  SpO2: 93% (2023 03:00) (87% - 100%)    Parameters below as of 2023 00:00  Patient On (Oxygen Delivery Method): nasal cannula  O2 Flow (L/min): 2      PHYSICAL EXAM:  GENERAL: NAD, well-groomed, well-developed  HEAD:  Atraumatic, Normocephalic  EYES: EOMI, PERRLA, conjunctiva and sclera clear  ENMT:  Moist mucous membranes  NECK: Supple, No JVD,  CHEST/LUNG: Clear to auscultation bilaterally; No rales, rhonchi, wheezing, or rubs  HEART: Regular rate and rhythm; No murmurs, rubs, or gallops  ABDOMEN: Soft, Nontender, Nondistended; Bowel sounds present  NEURO: Alert & Oriented X3  EXTREMITIES: No LE edema, no calf tenderness  LYMPH: No lymphadenopathy noted  SKIN: No rashes or lesions    Consultant(s) Notes Reviewed:  [x ] YES  [ ] NO  Care Discussed with Consultants/Other Providers [ x] YES  [ ] NO    LABS:                        7.7    8.56  )-----------( 242      ( 2023 03:10 )             28.1     -18    143  |  96  |  9.8  ----------------------------<  78  2.9<LL>   |  38.0<H>  |  0.69    Ca    8.7      2023 03:10  Phos  2.8     -  Mg     1.5         TPro  6.1<L>  /  Alb  2.9<L>  /  TBili  1.3  /  DBili  x   /  AST  12  /  ALT  11  /  AlkPhos  295<H>  -18      Urinalysis Basic - ( 2023 09:30 )    Color: Yellow / Appearance: Clear / S.005 / pH: x  Gluc: x / Ketone: Negative  / Bili: Negative / Urobili: Negative mg/dL   Blood: x / Protein: Negative / Nitrite: Negative   Leuk Esterase: Negative / RBC: 0-2 /HPF / WBC 0-2 /HPF   Sq Epi: x / Non Sq Epi: x / Bacteria: Occasional      CAPILLARY BLOOD GLUCOSE      POCT Blood Glucose.: 218 mg/dL (2023 16:35)  POCT Blood Glucose.: 199 mg/dL (2023 08:27)  POCT Blood Glucose.: 172 mg/dL (2023 06:56)      ABG - ( 2023 18:42 )  pH, Arterial: 7.510 pH, Blood: x     /  pCO2: 52    /  pO2: 70    / HCO3: 42    / Base Excess: 18.5  /  SaO2: 96.7              Urinalysis Basic - ( 2023 09:30 )    Color: Yellow / Appearance: Clear / S.005 / pH: x  Gluc: x / Ketone: Negative  / Bili: Negative / Urobili: Negative mg/dL   Blood: x / Protein: Negative / Nitrite: Negative   Leuk Esterase: Negative / RBC: 0-2 /HPF / WBC 0-2 /HPF   Sq Epi: x / Non Sq Epi: x / Bacteria: Occasional        RADIOLOGY & ADDITIONAL TESTS:    Imaging Personally Reviewed:  [x ] YES  [ ] NO   MICU DOWN GRADE NOTE      Patient is a 68y old  Female who presents with a chief complaint of heart failure (2023 01:53)      HPI: 67 y/o F w/ PMH of HFrEF (EF 25% in ), substance abuse, HTN, HLD, DMII came in c/o sob and leg swelling.  Unable to obtain hx from pt as pt is obtundant.  Hx obtained from records.  In the ED pt was noted to have increased wob and volume overloaded and was given IV diuresis and placed on AVAPs.  ICU was consulted and agreed w/ management and no MICU admission at this time.  Records show pts last TTE was from  and EF at that time noted to be 25%.      INTERVAL HPI/OVERNIGHT EVENTS:67yo F with PMHx of HFrED, substance abuse, HTN, DM admitted for decompensated HF requiring AVAPs course complicated by acute toxic metabolic encephalopathy requiring multiple doses Narcan with transient improvement then pt was RRT on  for worsening mentation/obtunded with worsening hypoxic respiratory failure upgraded to MICU for Narcan gtt clinical concerns for  long-acting synthetic opioid abuse. Pt seen and examined at bedside appear lethargic, AAO x1-2. HR on monitor 140s, EKG performed at bedside showed sinus tachycardia, 5mg IV Lopressor stat was ordered by MICU, pt denies cp.           REVIEW OF SYSTEMS:  CONSTITUTIONAL: No fever, chills  HEENT:  No blurry vision No sinus or throat pain  NECK: No pain or stiffness  RESPIRATORY: No cough, wheezing, chills or hemoptysis; No shortness of breath  CARDIOVASCULAR: No chest pain, palpitations  GASTROINTESTINAL: No abdominal pain. No nausea, vomiting, or diarrhea  GENITOURINARY: No dysuria  NEUROLOGICAL: No HA, No focal weakness  SKIN: No itching, burning, rashes, or lesions   MUSCULOSKELETAL: No joint pain or swelling; No muscle, back, or extremity pain    MEDICATIONS:  acetaminophen     Tablet .. 650 milliGRAM(s) Oral every 6 hours PRN  aluminum hydroxide/magnesium hydroxide/simethicone Suspension 30 milliLiter(s) Oral every 4 hours PRN  chlorhexidine 2% Cloths 1 Application(s) Topical <User Schedule>  chlorhexidine 4% Liquid 1 Application(s) Topical <User Schedule>  dextrose 5%. 1000 milliLiter(s) IV Continuous <Continuous>  dextrose 5%. 1000 milliLiter(s) IV Continuous <Continuous>  dextrose 50% Injectable 25 Gram(s) IV Push once  dextrose 50% Injectable 25 Gram(s) IV Push once  dextrose 50% Injectable 12.5 Gram(s) IV Push once  dextrose Oral Gel 15 Gram(s) Oral once PRN  furosemide   Injectable 40 milliGRAM(s) IV Push two times a day  glucagon  Injectable 1 milliGRAM(s) IntraMuscular once  haloperidol    Injectable 5 milliGRAM(s) IV Push every 6 hours PRN  insulin lispro (ADMELOG) corrective regimen sliding scale   SubCutaneous three times a day before meals  magnesium sulfate  IVPB 2 Gram(s) IV Intermittent once  melatonin 3 milliGRAM(s) Oral at bedtime PRN  potassium chloride   Powder 40 milliEquivalent(s) Oral once  potassium chloride  10 mEq/100 mL IVPB 10 milliEquivalent(s) IV Intermittent every 1 hour      T(C): 36.3 (23 @ 04:00), Max: 37.1 (23 @ 23:45)  HR: 83 (23 @ 03:00) (66 - 96)  BP: 121/62 (23 @ 03:00) (90/56 - 149/69)  RR: 25 (23 @ 03:00) (12 - 32)  SpO2: 93% (23 @ 03:00) (87% - 100%)  Wt(kg): --Vital Signs Last 24 Hrs  T(C): 36.3 (2023 04:00), Max: 37.1 (2023 23:45)  T(F): 97.4 (2023 04:00), Max: 98.8 (2023 23:45)  HR: 83 (2023 03:00) (66 - 96)  BP: 121/62 (2023 03:00) (90/56 - 149/69)  BP(mean): 76 (2023 03:00) (65 - 111)  RR: 25 (2023 03:00) (12 - 32)  SpO2: 93% (2023 03:00) (87% - 100%)    Parameters below as of 2023 00:00  Patient On (Oxygen Delivery Method): nasal cannula  O2 Flow (L/min): 2      PHYSICAL EXAM:  GENERAL: NAD, well-groomed, well-developed  HEAD:  Atraumatic, Normocephalic  EYES: EOMI, PERRLA, conjunctiva and sclera clear  ENMT:  Moist mucous membranes  NECK: Supple, No JVD,  CHEST/LUNG: Clear to auscultation bilaterally; No rales, rhonchi, wheezing, or rubs  HEART: Regular rate and rhythm; No murmurs, rubs, or gallops  ABDOMEN: Soft, Nontender, Nondistended; Bowel sounds present  NEURO: Alert & Oriented X3  EXTREMITIES: No LE edema, no calf tenderness  LYMPH: No lymphadenopathy noted  SKIN: No rashes or lesions    Consultant(s) Notes Reviewed:  [x ] YES  [ ] NO  Care Discussed with Consultants/Other Providers [ x] YES  [ ] NO    LABS:                        7.7    8.56  )-----------( 242      ( 2023 03:10 )             28.1     04-18    143  |  96  |  9.8  ----------------------------<  78  2.9<LL>   |  38.0<H>  |  0.69    Ca    8.7      2023 03:10  Phos  2.8     04-18  Mg     1.5     04-18    TPro  6.1<L>  /  Alb  2.9<L>  /  TBili  1.3  /  DBili  x   /  AST  12  /  ALT  11  /  AlkPhos  295<H>  04-18      Urinalysis Basic - ( 2023 09:30 )    Color: Yellow / Appearance: Clear / S.005 / pH: x  Gluc: x / Ketone: Negative  / Bili: Negative / Urobili: Negative mg/dL   Blood: x / Protein: Negative / Nitrite: Negative   Leuk Esterase: Negative / RBC: 0-2 /HPF / WBC 0-2 /HPF   Sq Epi: x / Non Sq Epi: x / Bacteria: Occasional      CAPILLARY BLOOD GLUCOSE      POCT Blood Glucose.: 218 mg/dL (2023 16:35)  POCT Blood Glucose.: 199 mg/dL (2023 08:27)  POCT Blood Glucose.: 172 mg/dL (2023 06:56)      ABG - ( 2023 18:42 )  pH, Arterial: 7.510 pH, Blood: x     /  pCO2: 52    /  pO2: 70    / HCO3: 42    / Base Excess: 18.5  /  SaO2: 96.7              Urinalysis Basic - ( 2023 09:30 )    Color: Yellow / Appearance: Clear / S.005 / pH: x  Gluc: x / Ketone: Negative  / Bili: Negative / Urobili: Negative mg/dL   Blood: x / Protein: Negative / Nitrite: Negative   Leuk Esterase: Negative / RBC: 0-2 /HPF / WBC 0-2 /HPF   Sq Epi: x / Non Sq Epi: x / Bacteria: Occasional        RADIOLOGY & ADDITIONAL TESTS:    Imaging Personally Reviewed:  [x ] YES  [ ] NO

## 2023-04-18 NOTE — PROGRESS NOTE ADULT - SUBJECTIVE AND OBJECTIVE BOX
Patient is a 68y old  Female who presents with a chief complaint of heart failure (2023 10:05)      BRIEF HOSPITAL COURSE: 68-year-old -American female with past medical history of coronary artery disease s/p drug-eluting stent to mid LAD in  with heart failure with reduced EF polysubstance abuse essential hypertension dyslipidemia type 2 diabetes mellitus obstructive sleep apnea who initially presented to [facility] ED on  with shortness of breath and leg edema being admitted for acute heart failure with reduced EF exacerbation with cocaine abuse with waxing and waning encephalopathy requiring multiple doses of Narcan with transient improvement in mentation, currently being admitted to medical ICU for acute toxic/metabolic encephalopathy with acute hypoxic hypercapnic respiratory failure with lactic acidosis with underlying heart failure (with reduced EF) exacerbation with  clinical concerns for  long-acting synthetic opioid abuse    Events last 24 hours: Pt started on Narcan gtt and AVAPS with clinical improvement. Narcan gtt weaned off  @ 1400, continues to be asymptomatic and is on NC in NARD.    PAST MEDICAL & SURGICAL HISTORY:  HTN (hypertension)      Hyperlipidemia      Type 2 diabetes mellitus      Depression      Anxiety      Insomnia      Morbid obesity with BMI of 40.0-44.9, adult      BEATRICE (obstructive sleep apnea)      Osteoarthritis      Endometrial hyperplasia      S/P  section  x 1      S/P shoulder surgery  right           Review of Systems:  Pt refusing to answer questions    Medications:    furosemide   Injectable 40 milliGRAM(s) IV Push two times a day      acetaminophen     Tablet .. 650 milliGRAM(s) Oral every 6 hours PRN  haloperidol    Injectable 5 milliGRAM(s) IV Push every 6 hours PRN  melatonin 3 milliGRAM(s) Oral at bedtime PRN        aluminum hydroxide/magnesium hydroxide/simethicone Suspension 30 milliLiter(s) Oral every 4 hours PRN      dextrose 50% Injectable 25 Gram(s) IV Push once  dextrose 50% Injectable 25 Gram(s) IV Push once  dextrose 50% Injectable 12.5 Gram(s) IV Push once  dextrose Oral Gel 15 Gram(s) Oral once PRN  glucagon  Injectable 1 milliGRAM(s) IntraMuscular once  insulin lispro (ADMELOG) corrective regimen sliding scale   SubCutaneous three times a day before meals    dextrose 5%. 1000 milliLiter(s) IV Continuous <Continuous>  dextrose 5%. 1000 milliLiter(s) IV Continuous <Continuous>      chlorhexidine 2% Cloths 1 Application(s) Topical <User Schedule>  chlorhexidine 4% Liquid 1 Application(s) Topical <User Schedule>    naloxone Infusion 0.4 mG/Hr IV Continuous <Continuous>  naloxone Injectable 0.4 milliGRAM(s) IV Push once          ICU Vital Signs Last 24 Hrs  T(C): 37.1 (2023 23:45), Max: 37.1 (2023 23:45)  T(F): 98.8 (2023 23:45), Max: 98.8 (2023 23:45)  HR: 66 (:00) (66 - 96)  BP: 116/60 (:00) (90/56 - 149/69)  BP(mean): 77 (:00) (65 - 111)  ABP: --  ABP(mean): --  RR: 18 (2023 01:00) (12 - 32)  SpO2: 100% (:00) (87% - 100%)    O2 Parameters below as of 2023 00:00  Patient On (Oxygen Delivery Method): nasal cannula  O2 Flow (L/min): 2          ABG - ( 2023 18:42 )  pH, Arterial: 7.510 pH, Blood: x     /  pCO2: 52    /  pO2: 70    / HCO3: 42    / Base Excess: 18.5  /  SaO2: 96.7                I&O's Detail    2023 07:01  -  2023 07:00  --------------------------------------------------------  IN:  Total IN: 0 mL    OUT:    Voided (mL): 8000 mL  Total OUT: 8000 mL    Total NET: -8000 mL      2023 07:01  -  2023 01:54  --------------------------------------------------------  IN:    IV PiggyBack: 50 mL    Naloxone: 175 mL  Total IN: 225 mL    OUT:    Voided (mL): 2450 mL  Total OUT: 2450 mL    Total NET: -2225 mL          LABS:                        7.5    7.95  )-----------( 246      ( 2023 09:45 )             27.8         144  |  101  |  9.9  ----------------------------<  186<H>  4.1   |  31.0<H>  |  0.66    Ca    8.2<L>      2023 09:45  Phos  3.3       Mg     1.6         TPro  6.2<L>  /  Alb  2.9<L>  /  TBili  1.4  /  DBili  x   /  AST  18  /  ALT  14  /  AlkPhos  333<H>        CARDIAC MARKERS ( 2023 09:45 )  x     / x     / 35 U/L / x     / x      CARDIAC MARKERS ( 2023 08:52 )  x     / 0.03 ng/mL / x     / x     / x      CARDIAC MARKERS ( 2023 03:10 )  x     / 0.03 ng/mL / x     / x     / x          CAPILLARY BLOOD GLUCOSE      POCT Blood Glucose.: 218 mg/dL (2023 16:35)      Urinalysis Basic - ( 2023 09:30 )    Color: Yellow / Appearance: Clear / S.005 / pH: x  Gluc: x / Ketone: Negative  / Bili: Negative / Urobili: Negative mg/dL   Blood: x / Protein: Negative / Nitrite: Negative   Leuk Esterase: Negative / RBC: 0-2 /HPF / WBC 0-2 /HPF   Sq Epi: x / Non Sq Epi: x / Bacteria: Occasional      CULTURES:  Culture Results:   <10,000 CFU/mL Normal Urogenital Kalie ( @ 09:30)  Culture Results:   No growth to date. ( @ 08:52)  Culture Results:   No growth to date. ( @ 08:50)  Rapid RVP Result: NotDetec ( @ 03:10)      Physical Examination:    General: Well appearing, lying in bed in NAD.      HEENT: Pupils equal, reactive to light. Symmetric. No scleral icterus or injection.    PULM: Clear to auscultation B/L. No wheezes, rales, or rhonchi apprecaited. No significant sputum production or increased respiratory effort.    NECK: Supple, no lymphadenopathy, trachea midline.    CVS: Regular rate and rhythm, no murmurs appreciated, +s1/s2.    ABD: Soft, nondistended, nontender, normoactive bowel sounds.    EXT: No edema, nontender.    SKIN: Warm and well perfused, no rashes noted.    NEURO: Alert, oriented, interactive, nonfocal and intermittently agitated    RADIOLOGY: < from: Xray Chest 1 View- PORTABLE-Urgent (Xray Chest 1 View- PORTABLE-Urgent .) (23 @ 09:43) >  ACC: [id number] EXAM:  XR CHEST PORTABLE URGENT 1V   ORDERED BY: ROBIN NATHAN     PROCEDURE DATE:  2023          INTERPRETATION:  TECHNIQUE: Single portable view of the chest.    COMPARISON:  2023 x-ray and chest CT    CLINICAL HISTORY: Shortness of Breath    FINDINGS:    Single frontal view of the chest demonstrates small right-sided effusion,   unchanged in size. Right hilar segmental atelectasis The   cardiomediastinal silhouette is enlarged. No acute osseous abnormalities.   Overlying EKG leads and wires are noted    IMPRESSION: Small right-sided pleural effusion, unchanged. Cardiomegaly.    --- End of Report ---          < end of copied text >

## 2023-04-18 NOTE — PROGRESS NOTE ADULT - PROBLEM SELECTOR PLAN 1
-Known ischemia in 2020  -EF 20-25%, DDII, PASP 48.6  -BNP 2827  -CTA with small loculated pleural effusion  -Complete AMS 4/17, doesn't appear overtly overloaded  -Net neg 2L in 24hrs  -Bicarb worsening  -GDMT: lasix.    -Now with persistent ST since 0443 today. Euvolemic on exam. Didn't receive AM lasix dosing. Stopping lasix  -Start coreg low dose. Once tachy resolved start entresto 24/26, aldactone, farxiga

## 2023-04-18 NOTE — PROGRESS NOTE ADULT - ASSESSMENT
69yo F with PMHx of HFrED, substance abuse, HTN, DM admitted for decompensated HF requiring AVAPs course complicated by acute toxic metabolic encephalopathy requiring multiple doses Narcan with transient improvement then pt was RRT on 4/17 for worsening mentation/obtunded with worsening hypoxic respiratory failure upgraded to MICU for Narcan gtt clinical concerns for  long-acting synthetic opioid abuse.    Acute Toxic metabolic encephalopathy  -likely due to substance abuse, concerns for long-acting synthetic opioid use   -s/p Narcan infusion   -mental status improved but still lethargic, AAOx 1-2  -cont to keep NPO for now   -monitor closely     Sinus tachycardia   -HR 140s on monitor, EKG with sinus tachy   -pt ordered for 5mg IV lopressor x 1 dose   -Mg 1.5, K 2.9 and in process of getting repletion  -monitor HR closely  -cardiology following    Acute decompensated HFrEF   -off AVAPs, sating well on 2L NC   -cont with Lasix 40mg IV BID   -daily weight, strict I/O  -cardiology following     DM  -ISS, hypoglycemic protocol  -A1c 7.2     DVT ppx  scd

## 2023-04-18 NOTE — PROGRESS NOTE ADULT - ASSESSMENT
69 y/o F with a PMHx of CAD s/p HERMES x 2 mLAD (12/2020, with residual 70% RCA disease), HFrEF (EF 25-30%), substance abuse, HTN, HLD, DMII, and BEATRICE who presented to the ED with complaints of shortness of breath and leg swelling. Had lethargy 4/16. Now AMS episodes s/p 2 RRTs now upgraded to ICU for narcan gtt

## 2023-04-18 NOTE — PROGRESS NOTE ADULT - SUBJECTIVE AND OBJECTIVE BOX
[facility]                                                         CARDIOLOGY AT [place] [address]                                                         Telephone: [phone]. Fax:[phone]                                                                             PROGRESS NOTE    Reason for follow up: AoC HFrEF  Update: ST on tele      Review of symptoms:   Cardiac:  No chest pain. No dyspnea. No palpitations.  Respiratory: no cough. No dyspnea  Gastrointestinal: No diarrhea. No abdominal pain. No bleeding.   Neuro: No focal neuro complaints.    Vitals:  T(C): 36.5 (04-18-23 @ 07:21), Max: 37.1 (04-17-23 @ 23:45)  HR: 136 (04-18-23 @ 08:00) (66 - 139)  BP: 123/76 (04-18-23 @ 08:00) (90/56 - 149/69)  RR: 22 (04-18-23 @ 08:00) (12 - 32)  SpO2: 91% (04-18-23 @ 08:00) (87% - 100%)  Wt(kg): --  I&O's Summary    17 Apr 2023 07:01  -  18 Apr 2023 07:00  --------------------------------------------------------  IN: 525 mL / OUT: 2700 mL / NET: -2175 mL    18 Apr 2023 07:01  -  18 Apr 2023 09:17  --------------------------------------------------------  IN: 50 mL / OUT: 0 mL / NET: 50 mL      Weight (kg): 79 (04-17 @ 10:00)    PHYSICAL EXAM:  Appearance: Comfortable. No acute distress  HEENT:  Atraumatic. Normocephalic.  Normal oral mucosa  Neurologic: A & O x 3, no gross focal deficits.  Cardiovascular: RRR S1 S2, No murmur, no rubs/gallops. No JVD  Respiratory: Lungs clear to auscultation, unlabored   Gastrointestinal:  Soft, Non-tender, + BS  Lower Extremities: 2+ Peripheral Pulses, No clubbing, cyanosis, or edema  Psychiatry: Patient is calm. No agitation.   Skin: warm and dry.    CURRENT CARDIAC MEDICATIONS:  furosemide   Injectable 40 milliGRAM(s) IV Push two times a day      CURRENT OTHER MEDICATIONS:  acetaminophen     Tablet .. 650 milliGRAM(s) Oral every 6 hours PRN Temp greater or equal to 38C (100.4F), Mild Pain (1 - 3)  haloperidol    Injectable 5 milliGRAM(s) IV Push every 6 hours PRN Agitation  melatonin 3 milliGRAM(s) Oral at bedtime PRN Insomnia  aluminum hydroxide/magnesium hydroxide/simethicone Suspension 30 milliLiter(s) Oral every 4 hours PRN Dyspepsia  dextrose 50% Injectable 25 Gram(s) IV Push once, Stop order after: 1 Doses  dextrose 50% Injectable 12.5 Gram(s) IV Push once, Stop order after: 1 Doses  dextrose 50% Injectable 25 Gram(s) IV Push once, Stop order after: 1 Doses  dextrose Oral Gel 15 Gram(s) Oral once, Stop order after: 1 Doses PRN Blood Glucose LESS THAN 70 milliGRAM(s)/deciliter  glucagon  Injectable 1 milliGRAM(s) IntraMuscular once, Stop order after: 1 Doses  insulin lispro (ADMELOG) corrective regimen sliding scale   SubCutaneous three times a day before meals  chlorhexidine 2% Cloths 1 Application(s) Topical <User Schedule>  chlorhexidine 4% Liquid 1 Application(s) Topical <User Schedule>  dextrose 5%. 1000 milliLiter(s) (100 mL/Hr) IV Continuous <Continuous>  dextrose 5%. 1000 milliLiter(s) (50 mL/Hr) IV Continuous <Continuous>      LABS:	 	  ( 17 Apr 2023 09:45 )  Troponin T  X    ,  CPK  35   , CKMB  X    , BNP X        , ( 16 Apr 2023 08:52 )  Troponin T  0.03 ,  CPK  X    , CKMB  X    , BNP X        , ( 16 Apr 2023 03:10 )  Troponin T  0.03 ,  CPK  X    , CKMB  X    , BNP X                                  7.7    8.56  )-----------( 242      ( 18 Apr 2023 03:10 )             28.1     04-18    143  |  96  |  9.8  ----------------------------<  78  2.9<LL>   |  38.0<H>  |  0.69    Ca    8.7      18 Apr 2023 03:10  Phos  2.8     04-18  Mg     1.5     04-18    TPro  6.1<L>  /  Alb  2.9<L>  /  TBili  1.3  /  DBili  x   /  AST  12  /  ALT  11  /  AlkPhos  295<H>  04-18      Lipid Profile:   HgA1c:   TSH: Thyroid Stimulating Hormone, Serum: 1.14 uIU/mL  Thyroid Stimulating Hormone, Serum: 0.64 uIU/mL      TELEMETRY: ST        DIAGNOSTIC TESTING:  [ ] Echocardiogram:   < from: TTE Echo Complete w/o Contrast w/ Doppler (04.16.23 @ 15:09) >  PHYSICIAN INTERPRETATION:  Left Ventricle: The left ventricular internal cavity size is normal.  Left ventricular ejection fraction, by visual estimation, is20 to 25%.   Spectral Doppler shows pseudonormal pattern of left ventricular   myocardial filling (Grade II diastolic dysfunction). Findings are   consistent with dilated cardiomyopathy.  Right Ventricle: The right ventricular size is mildly enlarged. RV   systolic function is mildly reduced.  Left Atrium: Moderately enlarged left atrium.  Right Atrium: Mildly enlarged right atrium.  Pericardium: There is no evidence of pericardial effusion.  Mitral Valve: Mild thickening of the anterior and posterior mitral valve   leaflets. Moderate mitral valve regurgitation is seen.  Tricuspid Valve: The tricuspid valve is normal in structure. Mild   tricuspid regurgitation is visualized. Estimated pulmonary artery   systolic pressure is 48.6 mmHg assuming a right atrial pressure of 15   mmHg, which is consistent with mild pulmonary hypertension.  Aortic Valve: The aortic valve is trileaflet. Sclerotic aortic valve with   normal opening. No evidence of aortic valve regurgitation is seen.  Pulmonic Valve: The pulmonic valve was not well visualized. Trace   pulmonic valve regurgitation.  Aorta: The aortic root is normal in size and structure.  Pulmonary Artery: The pulmonary artery is of normal size and origin.  Venous: The inferior vena cava is 2.2cm. The inferior vena cava was   dilated, with respiratory size variation less than 50%.  In comparison to the previous echocardiogram(s): Prior examinations are   available and were reviewed for comparison purposes. Compared to the   prior TTE studyfrom 12/14/20, overall no significant change.    < end of copied text >    [ ]  Catheterization:  < from: Cardiac Cath Lab - Adult (12.18.20 @ 11:09) >  CORONARY VESSELS: The coronary circulation is right dominant.  LM:   --  LM: Normal.  LAD:   --  Mid LAD: There was a 80 % stenosis. The lesion was heavily  calcified.  CX:   --  Circumflex: Normal.  RCA:   --  Mid RCA: There was a 70 % stenosis. The lesion was moderately  calcified.    < end of copied text >

## 2023-04-18 NOTE — DIETITIAN INITIAL EVALUATION ADULT - CONTINUE CURRENT NUTRITION CARE PLAN
- Advance diet as medically feasible/tolerable; suggest SLP swallow evaluation prior to diet advancement./yes

## 2023-04-18 NOTE — DIETITIAN INITIAL EVALUATION ADULT - OTHER INFO
68 year old female with PMHx of HFrED, substance abuse, HTN, DM admitted for decompensated HF requiring AVAPs course complicated by acute toxic metabolic encephalopathy requiring multiple doses Narcan with transient improvement then pt was RRT on 4/17 for worsening mentation/obtunded with worsening hypoxic respiratory failure upgraded to MICU for Narcan gtt clinical concerns for long-acting synthetic opioid abuse.    Consult received for diet education- not appropriate at this time. Pt very agitated, 1:1 present at bedside. Remains NPO. RD to follow up as feasible.

## 2023-04-18 NOTE — PROGRESS NOTE ADULT - SUBJECTIVE AND OBJECTIVE BOX
[facility] Division of Hospital Medicine    Chief Complaint:  CHF and Acute Toxic metabolic encephalopathy    SUBJECTIVE / OVERNIGHT EVENTS: Patient seen and examined. She is alert and oriented by 2. She reports no pain and no shortness of breath. She denies drug. She states she lives with Son Rock and it is ok to call him for update. Per RN patient slept overngiht and became agitated this morning requiring Haldol. Now calm on my exam.     Patient denies chest pain, SOB, abd pain, N/V, fever, chills, dysuria or any other complaints. All remainder ROS negative.     MEDICATIONS  (STANDING):  atorvastatin 40 milliGRAM(s) Oral at bedtime  carvedilol 3.125 milliGRAM(s) Oral every 12 hours  chlorhexidine 2% Cloths 1 Application(s) Topical <User Schedule>  chlorhexidine 4% Liquid 1 Application(s) Topical <User Schedule>  dextrose 5%. 1000 milliLiter(s) (100 mL/Hr) IV Continuous <Continuous>  dextrose 5%. 1000 milliLiter(s) (50 mL/Hr) IV Continuous <Continuous>  dextrose 50% Injectable 12.5 Gram(s) IV Push once  dextrose 50% Injectable 25 Gram(s) IV Push once  dextrose 50% Injectable 25 Gram(s) IV Push once  glucagon  Injectable 1 milliGRAM(s) IntraMuscular once  insulin lispro (ADMELOG) corrective regimen sliding scale   SubCutaneous three times a day before meals    MEDICATIONS  (PRN):  acetaminophen     Tablet .. 650 milliGRAM(s) Oral every 6 hours PRN Temp greater or equal to 38C (100.4F), Mild Pain (1 - 3)  aluminum hydroxide/magnesium hydroxide/simethicone Suspension 30 milliLiter(s) Oral every 4 hours PRN Dyspepsia  dextrose Oral Gel 15 Gram(s) Oral once PRN Blood Glucose LESS THAN 70 milliGRAM(s)/deciliter  haloperidol    Injectable 5 milliGRAM(s) IV Push every 6 hours PRN Agitation  melatonin 3 milliGRAM(s) Oral at bedtime PRN Insomnia        I&O's Summary    17 Apr 2023 07:01  -  18 Apr 2023 07:00  --------------------------------------------------------  IN: 525 mL / OUT: 2700 mL / NET: -2175 mL    18 Apr 2023 07:01  -  18 Apr 2023 10:51  --------------------------------------------------------  IN: 50 mL / OUT: 0 mL / NET: 50 mL        PHYSICAL EXAM:  Vital Signs Last 24 Hrs  T(C): 36.5 (18 Apr 2023 07:21), Max: 37.1 (17 Apr 2023 23:45)  T(F): 97.7 (18 Apr 2023 07:21), Max: 98.8 (17 Apr 2023 23:45)  HR: 115 (18 Apr 2023 09:00) (66 - 139)  BP: 110/48 (18 Apr 2023 09:00) (90/56 - 149/69)  BP(mean): 67 (18 Apr 2023 09:00) (65 - 105)  RR: 29 (18 Apr 2023 09:00) (12 - 30)  SpO2: 100% (18 Apr 2023 09:00) (91% - 100%)    Parameters below as of 18 Apr 2023 08:00  Patient On (Oxygen Delivery Method): nasal cannula            CONSTITUTIONAL: NAD  ENMT: Moist oral mucosa, no pharyngeal injection or exudates;  RESPIRATORY: Normal respiratory effort; lungs are clear to auscultation bilaterally, decreased breath sounds in the bases  CARDIOVASCULAR: tachycardic and rhythm, normal S1 and S2, no murmur/rub/gallop; trace lower extremity edema;  ABDOMEN: Nontender to palpation, normoactive bowel sounds, no rebound/guarding; No hepatosplenomegaly  MUSCLOSKELETAL:  no joint swelling or tenderness to palpation  PSYCH: A+O to person and place, affect appropriate  NEUROLOGY: CN 2-12 are intact and symmetric; no gross sensory deficits;   SKIN: No rashes; no palpable lesions    LABS:                        7.7    8.56  )-----------( 242      ( 18 Apr 2023 03:10 )             28.1     04-18    143  |  96  |  9.8  ----------------------------<  78  2.9<LL>   |  38.0<H>  |  0.69    Ca    8.7      18 Apr 2023 03:10  Phos  2.8     04-18  Mg     1.5     04-18    TPro  6.1<L>  /  Alb  2.9<L>  /  TBili  1.3  /  DBili  x   /  AST  12  /  ALT  11  /  AlkPhos  295<H>  04-18      CARDIAC MARKERS ( 17 Apr 2023 09:45 )  x     / x     / 35 U/L / x     / x              Culture - Urine (collected 16 Apr 2023 09:30)  Source: Clean Catch Clean Catch (Midstream)  Final Report (17 Apr 2023 19:22):    <10,000 CFU/mL Normal Urogenital Kalie    Culture - Blood (collected 16 Apr 2023 08:52)  Source: .Blood Blood-Peripheral  Preliminary Report (17 Apr 2023 13:02):    No growth to date.    Culture - Blood (collected 16 Apr 2023 08:50)  Source: .Blood Blood-Peripheral  Preliminary Report (17 Apr 2023 13:02):    No growth to date.      CAPILLARY BLOOD GLUCOSE      POCT Blood Glucose.: 78 mg/dL (18 Apr 2023 06:27)  POCT Blood Glucose.: 218 mg/dL (17 Apr 2023 16:35)        RADIOLOGY & ADDITIONAL TESTS:  Results Reviewed:   Imaging Personally Reviewed:  Electrocardiogram Personally Reviewed:

## 2023-04-18 NOTE — PROGRESS NOTE ADULT - ASSESSMENT
68-year-old -American female with past medical history of coronary artery disease s/p drug-eluting stent to mid LAD in 2020 with heart failure with reduced EF polysubstance abuse essential hypertension dyslipidemia type 2 diabetes mellitus obstructive sleep apnea who initially presented to [facility] ED on April 16 with shortness of breath and leg edema being admitted for     # Acute HFref exacerbation  # Cocaine Abuse/ Long acting synthetic opioid abuse  # Metabolic Encephalopathy  # Acute Hypercapnic Respiratory Failure    Neuro:  - Avoid Neuro Deliriogenic / sedative medications  - Aspiration Precautions, HOB > 30 degrees  - s/p Narcan gtt dc'd, 4/17 @1400  - Melatonin for sleep aide  - Haldol for agitation    CV:  - No active issues, Normotensive  - Avoid fluid overload  - Continue current antihypertensive regimen, Lasix 40mg BID    Pulm:  - Supplemental oxygen as needed to maintain SpO2 > 92%,  - Incentive spirometry  - Off Avaps, tolerating NC, assess for need for nocturnal bipap                  GI:  - NPO except meds    Renal:  - Even to net negative fluid balance as tolerated by hemodynamics and renal fx.    - Preserved Renal Function Continue to monitor Bun/Cr and UOP  - Replacing electrolytes as needed with Goal K> 4, PO> 3, Mg> 2               - Strict I&O's  - Avoid Nephro toxic medication    Heme:  - SCDs for DVT/PE ppx     ID:  - Microbiology and Radiology reviewed   - trend CBC with diff, CMP  and fever curve  - Avoiding antibiotics unless there is a concern for a bacterial/fungal infection    Endo:  - ISS for aggressive glycemic control to limit FS glucose to < 180mg/dl.  - Keep Euthyroid    Plan discussed with Dr Mckee, Pt stable for downgrade and is no linger requiring ICU criteria 68-year-old -American female with past medical history of coronary artery disease s/p drug-eluting stent to mid LAD in 2020 with heart failure with reduced EF polysubstance abuse essential hypertension dyslipidemia type 2 diabetes mellitus obstructive sleep apnea who initially presented to [facility] ED on April 16 with shortness of breath and leg edema being admitted for     # Acute HFref exacerbation  # Cocaine Abuse/ Long acting synthetic opioid abuse  # Metabolic Encephalopathy  # Acute Hypercapnic Respiratory Failure    Neuro:  - Avoid Neuro Deliriogenic / sedative medications  - Aspiration Precautions, HOB > 30 degrees  - s/p Narcan gtt dc'd, 4/17 @1400  - Melatonin for sleep aide  - Haldol for agitation    CV:  - No active issues, Normotensive  - Avoid fluid overload  - Continue current antihypertensive regimen, Lasix 40mg BID    Pulm:  - Supplemental oxygen as needed to maintain SpO2 > 92%,  - Incentive spirometry  - Off Avaps, tolerating NC, assess for need for nocturnal bipap                  GI:  - NPO except meds    Renal:  - Even to net negative fluid balance as tolerated by hemodynamics and renal fx.    - Preserved Renal Function Continue to monitor Bun/Cr and UOP  - Replacing electrolytes as needed with Goal K> 4, PO> 3, Mg> 2               - Strict I&O's  - Avoid Nephro toxic medication    Heme:  - SCDs for DVT/PE ppx     ID:  - Microbiology and Radiology reviewed   - trend CBC with diff, CMP  and fever curve  - Avoiding antibiotics unless there is a concern for a bacterial/fungal infection    Endo:  - ISS for aggressive glycemic control to limit FS glucose to < 180mg/dl.  - Keep Euthyroid    Plan discussed with Dr Mckee, Pt stable for downgrade and is no longer requiring ICU criteria

## 2023-04-18 NOTE — DIETITIAN INITIAL EVALUATION ADULT - PERTINENT MEDS FT
MEDICATIONS  (STANDING):  carvedilol 3.125 milliGRAM(s) Oral every 12 hours  chlorhexidine 2% Cloths 1 Application(s) Topical <User Schedule>  chlorhexidine 4% Liquid 1 Application(s) Topical <User Schedule>  dextrose 5%. 1000 milliLiter(s) (100 mL/Hr) IV Continuous <Continuous>  dextrose 5%. 1000 milliLiter(s) (50 mL/Hr) IV Continuous <Continuous>  dextrose 50% Injectable 25 Gram(s) IV Push once  dextrose 50% Injectable 12.5 Gram(s) IV Push once  dextrose 50% Injectable 25 Gram(s) IV Push once  glucagon  Injectable 1 milliGRAM(s) IntraMuscular once  insulin lispro (ADMELOG) corrective regimen sliding scale   SubCutaneous three times a day before meals    MEDICATIONS  (PRN):  acetaminophen     Tablet .. 650 milliGRAM(s) Oral every 6 hours PRN Temp greater or equal to 38C (100.4F), Mild Pain (1 - 3)  aluminum hydroxide/magnesium hydroxide/simethicone Suspension 30 milliLiter(s) Oral every 4 hours PRN Dyspepsia  dextrose Oral Gel 15 Gram(s) Oral once PRN Blood Glucose LESS THAN 70 milliGRAM(s)/deciliter  haloperidol    Injectable 5 milliGRAM(s) IV Push every 6 hours PRN Agitation  melatonin 3 milliGRAM(s) Oral at bedtime PRN Insomnia

## 2023-04-18 NOTE — DIETITIAN INITIAL EVALUATION ADULT - PERTINENT LABORATORY DATA
04-18    143  |  96  |  9.8  ----------------------------<  78  2.9<LL>   |  38.0<H>  |  0.69    Ca    8.7      18 Apr 2023 03:10  Phos  2.8     04-18  Mg     1.5     04-18    TPro  6.1<L>  /  Alb  2.9<L>  /  TBili  1.3  /  DBili  x   /  AST  12  /  ALT  11  /  AlkPhos  295<H>  04-18  POCT Blood Glucose.: 78 mg/dL (04-18-23 @ 06:27)  A1C with Estimated Average Glucose Result: 7.2 % (04-17-23 @ 06:20)

## 2023-04-18 NOTE — PROGRESS NOTE ADULT - ASSESSMENT
69yo F with PMHx of HFrED, substance abuse, HTN, DM admitted for decompensated HF requiring AVAPs course complicated by acute toxic metabolic encephalopathy requiring multiple doses Narcan with transient improvement then pt was RRT on 4/17 for worsening mentation/obtunded with worsening hypoxic respiratory failure upgraded to MICU for Narcan gtt clinical concerns for  long-acting synthetic opioid abuse.    Acute Toxic metabolic encephalopathy  -likely due to substance abuse, concerns for long-acting synthetic opioid use   -s/p Narcan infusion   -mental status improved but still lethargic, AAOx 1-2  -cont to keep NPO for now   -monitor closely     Sinus tachycardia   -HR 140s on monitor, EKG with sinus tachy   -monitor HR closely  - start coreg 3.125 mg BID  -cardiology following    Acute decompensated HFrEF , improved  -off AVAPs, sating well on 2L NC   -stop IV diuresis, bicarb increasing   -daily weight, strict I/O  -cardiology following     CAD  Hx 12/2022 mLAD x2, RCA 70%  - statin  - start asa and monitor hgb    Hypokalemia and hypomagnesemia  - replaced  - will recheck    DM  -ISS, hypoglycemic protocol  -A1c 7.2     Microcytic anemia due to iron def  - start po iron    DVT ppx  scd     Dispo: remain inpt on tele, attempted to call [place] but number in  chart not in service

## 2023-04-19 LAB
ALBUMIN SERPL ELPH-MCNC: 3.1 G/DL — LOW (ref 3.3–5.2)
ALP SERPL-CCNC: 271 U/L — HIGH (ref 40–120)
ALT FLD-CCNC: 12 U/L — SIGNIFICANT CHANGE UP
ANION GAP SERPL CALC-SCNC: 15 MMOL/L — SIGNIFICANT CHANGE UP (ref 5–17)
AST SERPL-CCNC: 14 U/L — SIGNIFICANT CHANGE UP
BILIRUB SERPL-MCNC: 1.6 MG/DL — SIGNIFICANT CHANGE UP (ref 0.4–2)
BUN SERPL-MCNC: 12.7 MG/DL — SIGNIFICANT CHANGE UP (ref 8–20)
CALCIUM SERPL-MCNC: 8.8 MG/DL — SIGNIFICANT CHANGE UP (ref 8.4–10.5)
CHLORIDE SERPL-SCNC: 100 MMOL/L — SIGNIFICANT CHANGE UP (ref 96–108)
CO2 SERPL-SCNC: 33 MMOL/L — HIGH (ref 22–29)
CREAT SERPL-MCNC: 0.72 MG/DL — SIGNIFICANT CHANGE UP (ref 0.5–1.3)
EGFR: 90 ML/MIN/1.73M2 — SIGNIFICANT CHANGE UP
GLUCOSE BLDC GLUCOMTR-MCNC: 105 MG/DL — HIGH (ref 70–99)
GLUCOSE BLDC GLUCOMTR-MCNC: 110 MG/DL — HIGH (ref 70–99)
GLUCOSE BLDC GLUCOMTR-MCNC: 188 MG/DL — HIGH (ref 70–99)
GLUCOSE SERPL-MCNC: 88 MG/DL — SIGNIFICANT CHANGE UP (ref 70–99)
HCT VFR BLD CALC: 30 % — LOW (ref 34.5–45)
HGB BLD-MCNC: 7.8 G/DL — LOW (ref 11.5–15.5)
MAGNESIUM SERPL-MCNC: 1.9 MG/DL — SIGNIFICANT CHANGE UP (ref 1.6–2.6)
MCHC RBC-ENTMCNC: 16.4 PG — LOW (ref 27–34)
MCHC RBC-ENTMCNC: 26 GM/DL — LOW (ref 32–36)
MCV RBC AUTO: 63.2 FL — LOW (ref 80–100)
PHOSPHATE SERPL-MCNC: 3.9 MG/DL — SIGNIFICANT CHANGE UP (ref 2.4–4.7)
PLATELET # BLD AUTO: 254 K/UL — SIGNIFICANT CHANGE UP (ref 150–400)
POTASSIUM SERPL-MCNC: 3.7 MMOL/L — SIGNIFICANT CHANGE UP (ref 3.5–5.3)
POTASSIUM SERPL-SCNC: 3.7 MMOL/L — SIGNIFICANT CHANGE UP (ref 3.5–5.3)
PROT SERPL-MCNC: 6.4 G/DL — LOW (ref 6.6–8.7)
RBC # BLD: 4.75 M/UL — SIGNIFICANT CHANGE UP (ref 3.8–5.2)
RBC # FLD: 25.8 % — HIGH (ref 10.3–14.5)
SODIUM SERPL-SCNC: 148 MMOL/L — HIGH (ref 135–145)
WBC # BLD: 6.97 K/UL — SIGNIFICANT CHANGE UP (ref 3.8–10.5)
WBC # FLD AUTO: 6.97 K/UL — SIGNIFICANT CHANGE UP (ref 3.8–10.5)

## 2023-04-19 PROCEDURE — 99291 CRITICAL CARE FIRST HOUR: CPT

## 2023-04-19 PROCEDURE — 99232 SBSQ HOSP IP/OBS MODERATE 35: CPT

## 2023-04-19 PROCEDURE — 93010 ELECTROCARDIOGRAM REPORT: CPT

## 2023-04-19 RX ORDER — LOSARTAN POTASSIUM 100 MG/1
25 TABLET, FILM COATED ORAL DAILY
Refills: 0 | Status: DISCONTINUED | OUTPATIENT
Start: 2023-04-19 | End: 2023-04-22

## 2023-04-19 RX ORDER — FUROSEMIDE 40 MG
20 TABLET ORAL DAILY
Refills: 0 | Status: DISCONTINUED | OUTPATIENT
Start: 2023-04-20 | End: 2023-04-22

## 2023-04-19 RX ADMIN — Medication 81 MILLIGRAM(S): at 12:36

## 2023-04-19 RX ADMIN — CARVEDILOL PHOSPHATE 3.12 MILLIGRAM(S): 80 CAPSULE, EXTENDED RELEASE ORAL at 05:39

## 2023-04-19 RX ADMIN — LOSARTAN POTASSIUM 25 MILLIGRAM(S): 100 TABLET, FILM COATED ORAL at 13:32

## 2023-04-19 RX ADMIN — ATORVASTATIN CALCIUM 40 MILLIGRAM(S): 80 TABLET, FILM COATED ORAL at 22:00

## 2023-04-19 RX ADMIN — CHLORHEXIDINE GLUCONATE 1 APPLICATION(S): 213 SOLUTION TOPICAL at 05:39

## 2023-04-19 RX ADMIN — CHLORHEXIDINE GLUCONATE 1 APPLICATION(S): 213 SOLUTION TOPICAL at 08:35

## 2023-04-19 RX ADMIN — Medication 1: at 12:37

## 2023-04-19 RX ADMIN — Medication 325 MILLIGRAM(S): at 12:36

## 2023-04-19 NOTE — PHYSICAL THERAPY INITIAL EVALUATION ADULT - ASSISTIVE DEVICE:SIT/SUPINE, REHAB EVAL
Left message for patient to return our phone call  Pt is overdue for A1C & lipids.  Can we assist her with setting up an appointment?  Please extend orders if pt is agreeable.   bed rails

## 2023-04-19 NOTE — SWALLOW BEDSIDE ASSESSMENT ADULT - SLP GENERAL OBSERVATIONS
Pt received A&OX3 sitting upright in bed, O2 NC 2LPM SPO2 98%, with a vocal srivastava quality of voicing. 0/10 pain pre/post.

## 2023-04-19 NOTE — PROGRESS NOTE ADULT - PROBLEM SELECTOR PLAN 2
Has residual RCA disease  c/w asa, Lipitor and coreg  Bradycardia on monitor noted  hr 48-60  would not change coreg dose  Patient not a candidate for invasive procedures due to mentation and non compliance Has residual RCA disease  c/w asa, Lipitor and coreg  Bradycardia on monitor noted  hr 48-60  would not change coreg dose  Patient not a candidate for invasive procedures due to mentation and non compliance    Will sign off  please call for questions

## 2023-04-19 NOTE — SWALLOW BEDSIDE ASSESSMENT ADULT - SWALLOW EVAL: DIAGNOSIS
Oral phase WFL for puree, minced/moist, and thin liquid trials. Pharyngeal dysphagia suspected with soft/bite trials as a cough was noted post oral intake. Pharyngeal phase unremarkable for minced/moist, puree, and thin liquids as no s/s penetration/aspiration noted. Oral phase WFL for puree, minced/moist, soft/bite, and thin liquid trials. Pharyngeal dysphagia suspected with soft/bite trials as a cough was noted post oral intake. Pharyngeal phase unremarkable for minced/moist, puree, and thin liquids as no s/s penetration/aspiration noted.

## 2023-04-19 NOTE — PROGRESS NOTE ADULT - PROBLEM SELECTOR PLAN 1
Low na diet  Change Lasix to 40mg qd  intake and out put  start losartan 25mg qd  out patient start farxiga Low na diet  Lasix on hold due to high co2 and poor po intake  restart lasix 40mg po tomorrow  intake and out put  start losartan 25mg qd  out patient start farxiga

## 2023-04-19 NOTE — PROGRESS NOTE ADULT - SUBJECTIVE AND OBJECTIVE BOX
[facility] Division of Hospital Medicine    Chief Complaint:  CHF    SUBJECTIVE / OVERNIGHT EVENTS: Patient seen and examined. More calm today. Off 1:1 observation. She is alert and oriented by 2. NO shortness of breath. She is hungry.     Patient denies chest pain, SOB, abd pain, N/V, fever, chills, dysuria or any other complaints. All remainder ROS negative.     MEDICATIONS  (STANDING):  aspirin  chewable 81 milliGRAM(s) Oral daily  atorvastatin 40 milliGRAM(s) Oral at bedtime  carvedilol 3.125 milliGRAM(s) Oral every 12 hours  chlorhexidine 2% Cloths 1 Application(s) Topical <User Schedule>  chlorhexidine 4% Liquid 1 Application(s) Topical <User Schedule>  dextrose 5%. 1000 milliLiter(s) (50 mL/Hr) IV Continuous <Continuous>  dextrose 5%. 1000 milliLiter(s) (100 mL/Hr) IV Continuous <Continuous>  dextrose 50% Injectable 25 Gram(s) IV Push once  dextrose 50% Injectable 12.5 Gram(s) IV Push once  dextrose 50% Injectable 25 Gram(s) IV Push once  ferrous    sulfate 325 milliGRAM(s) Oral daily  glucagon  Injectable 1 milliGRAM(s) IntraMuscular once  insulin lispro (ADMELOG) corrective regimen sliding scale   SubCutaneous three times a day before meals    MEDICATIONS  (PRN):  acetaminophen     Tablet .. 650 milliGRAM(s) Oral every 6 hours PRN Temp greater or equal to 38C (100.4F), Mild Pain (1 - 3)  aluminum hydroxide/magnesium hydroxide/simethicone Suspension 30 milliLiter(s) Oral every 4 hours PRN Dyspepsia  dextrose Oral Gel 15 Gram(s) Oral once PRN Blood Glucose LESS THAN 70 milliGRAM(s)/deciliter  haloperidol    Injectable 5 milliGRAM(s) IV Push every 6 hours PRN Agitation  melatonin 3 milliGRAM(s) Oral at bedtime PRN Insomnia        I&O's Summary    18 Apr 2023 07:01  -  19 Apr 2023 07:00  --------------------------------------------------------  IN: 50 mL / OUT: 0 mL / NET: 50 mL        PHYSICAL EXAM:  Vital Signs Last 24 Hrs  T(C): 36.6 (19 Apr 2023 05:39), Max: 37 (18 Apr 2023 13:29)  T(F): 97.8 (19 Apr 2023 05:39), Max: 98.6 (18 Apr 2023 13:29)  HR: 78 (19 Apr 2023 05:39) (73 - 113)  BP: 141/83 (19 Apr 2023 05:39) (111/77 - 141/83)  BP(mean): 82 (18 Apr 2023 12:00) (82 - 90)  RR: 18 (19 Apr 2023 05:39) (18 - 21)  SpO2: 100% (19 Apr 2023 05:39) (98% - 100%)    Parameters below as of 19 Apr 2023 05:39  Patient On (Oxygen Delivery Method): nasal cannula  O2 Flow (L/min): 2          CONSTITUTIONAL: NAD, well-developed, well-groomed  ENMT: Moist oral mucosa, no pharyngeal injection or exudates; normal dentition  RESPIRATORY: Normal respiratory effort; lungs are clear to auscultation bilaterally  CARDIOVASCULAR: Regular rate and rhythm, normal S1 and S2, no murmur/rub/gallop; No lower extremity edema; Peripheral pulses are 2+ bilaterally  ABDOMEN: Nontender to palpation, normoactive bowel sounds, no rebound/guarding; No hepatosplenomegaly  MUSCLOSKELETAL:  Normal gait; no clubbing or cyanosis of digits; no joint swelling or tenderness to palpation  PSYCH: A+O to person, place, and time; affect appropriate  NEUROLOGY: CN 2-12 are intact and symmetric; no gross sensory deficits;   SKIN: No rashes; no palpable lesions    LABS:                        7.8    6.97  )-----------( 254      ( 19 Apr 2023 05:08 )             30.0     04-19    148<H>  |  100  |  12.7  ----------------------------<  88  3.7   |  33.0<H>  |  0.72    Ca    8.8      19 Apr 2023 05:08  Phos  3.9     04-19  Mg     1.9     04-19    TPro  6.4<L>  /  Alb  3.1<L>  /  TBili  1.6  /  DBili  x   /  AST  14  /  ALT  12  /  AlkPhos  271<H>  04-19              CAPILLARY BLOOD GLUCOSE      POCT Blood Glucose.: 105 mg/dL (19 Apr 2023 08:37)  POCT Blood Glucose.: 127 mg/dL (18 Apr 2023 17:48)  POCT Blood Glucose.: 106 mg/dL (18 Apr 2023 11:06)        RADIOLOGY & ADDITIONAL TESTS:  Results Reviewed:   Imaging Personally Reviewed:  Electrocardiogram Personally Reviewed:                                           [facility] Division of Hospital Medicine    Chief Complaint:  CHF    SUBJECTIVE / OVERNIGHT EVENTS: Patient seen and examined. More calm today. Off 1:1 observation. She is alert and oriented by 2. NO shortness of breath. She is hungry and wants to eat something.     Patient denies chest pain, SOB, abd pain, N/V, fever, chills, dysuria or any other complaints. All remainder ROS negative.     MEDICATIONS  (STANDING):  aspirin  chewable 81 milliGRAM(s) Oral daily  atorvastatin 40 milliGRAM(s) Oral at bedtime  carvedilol 3.125 milliGRAM(s) Oral every 12 hours  chlorhexidine 2% Cloths 1 Application(s) Topical <User Schedule>  chlorhexidine 4% Liquid 1 Application(s) Topical <User Schedule>  dextrose 5%. 1000 milliLiter(s) (50 mL/Hr) IV Continuous <Continuous>  dextrose 5%. 1000 milliLiter(s) (100 mL/Hr) IV Continuous <Continuous>  dextrose 50% Injectable 25 Gram(s) IV Push once  dextrose 50% Injectable 12.5 Gram(s) IV Push once  dextrose 50% Injectable 25 Gram(s) IV Push once  ferrous    sulfate 325 milliGRAM(s) Oral daily  glucagon  Injectable 1 milliGRAM(s) IntraMuscular once  insulin lispro (ADMELOG) corrective regimen sliding scale   SubCutaneous three times a day before meals    MEDICATIONS  (PRN):  acetaminophen     Tablet .. 650 milliGRAM(s) Oral every 6 hours PRN Temp greater or equal to 38C (100.4F), Mild Pain (1 - 3)  aluminum hydroxide/magnesium hydroxide/simethicone Suspension 30 milliLiter(s) Oral every 4 hours PRN Dyspepsia  dextrose Oral Gel 15 Gram(s) Oral once PRN Blood Glucose LESS THAN 70 milliGRAM(s)/deciliter  haloperidol    Injectable 5 milliGRAM(s) IV Push every 6 hours PRN Agitation  melatonin 3 milliGRAM(s) Oral at bedtime PRN Insomnia        I&O's Summary    18 Apr 2023 07:01  -  19 Apr 2023 07:00  --------------------------------------------------------  IN: 50 mL / OUT: 0 mL / NET: 50 mL        PHYSICAL EXAM:  Vital Signs Last 24 Hrs  T(C): 36.6 (19 Apr 2023 05:39), Max: 37 (18 Apr 2023 13:29)  T(F): 97.8 (19 Apr 2023 05:39), Max: 98.6 (18 Apr 2023 13:29)  HR: 78 (19 Apr 2023 05:39) (73 - 113)  BP: 141/83 (19 Apr 2023 05:39) (111/77 - 141/83)  BP(mean): 82 (18 Apr 2023 12:00) (82 - 90)  RR: 18 (19 Apr 2023 05:39) (18 - 21)  SpO2: 100% (19 Apr 2023 05:39) (98% - 100%)    Parameters below as of 19 Apr 2023 05:39  Patient On (Oxygen Delivery Method): nasal cannula  O2 Flow (L/min): 2      CONSTITUTIONAL: NAD  ENMT: Moist oral mucosa, no pharyngeal injection or exudates;  RESPIRATORY: Normal respiratory effort; lungs are clear to auscultation bilaterally, decreased breath sounds in the bases  CARDIOVASCULAR: Regular rate and rhythm, normal S1 and S2, no murmur/rub/gallop; trace lower extremity edema;  ABDOMEN: Nontender to palpation, normoactive bowel sounds, no rebound/guarding;  MUSCLOSKELETAL:  no joint swelling or tenderness to palpation  PSYCH: A+O to person and place, affect appropriate  NEUROLOGY: CN 2-12 are intact and symmetric; no gross sensory deficits;   SKIN: No rashes; no palpable lesions      LABS:                        7.8    6.97  )-----------( 254      ( 19 Apr 2023 05:08 )             30.0     04-19    148<H>  |  100  |  12.7  ----------------------------<  88  3.7   |  33.0<H>  |  0.72    Ca    8.8      19 Apr 2023 05:08  Phos  3.9     04-19  Mg     1.9     04-19    TPro  6.4<L>  /  Alb  3.1<L>  /  TBili  1.6  /  DBili  x   /  AST  14  /  ALT  12  /  AlkPhos  271<H>  04-19              CAPILLARY BLOOD GLUCOSE      POCT Blood Glucose.: 105 mg/dL (19 Apr 2023 08:37)  POCT Blood Glucose.: 127 mg/dL (18 Apr 2023 17:48)  POCT Blood Glucose.: 106 mg/dL (18 Apr 2023 11:06)        RADIOLOGY & ADDITIONAL TESTS:  Results Reviewed:   Imaging Personally Reviewed:  Electrocardiogram Personally Reviewed:

## 2023-04-19 NOTE — PROGRESS NOTE ADULT - SUBJECTIVE AND OBJECTIVE BOX
[facility]                                                         CARDIOLOGY AT [facility]                                                                  [address] [facility] [place] [address]- [facility]                                                         Telephone: [phone]. Fax:[phone]                                                                             PROGRESS NOTE    Reason for follow up:   CHF  Update: Patient remains sedated and difficult to communicate with  IN: 525 mL / OUT: 2700 mL / NET: -2175 mL  Last dose of Haldol at 6am yesterday    Review of symptoms:   Unable to obtain     Vitals:  T(C): 36.6 (04-19-23 @ 10:14), Max: 37 (04-18-23 @ 13:29)  HR: 98 (04-19-23 @ 10:14) (73 - 113)  BP: 127/73 (04-19-23 @ 10:14) (111/77 - 141/83)  RR: 18 (04-19-23 @ 10:14) (18 - 21)  SpO2: 98% (04-19-23 @ 10:14) (98% - 100%)  Wt(kg): --  I&O's Summary    18 Apr 2023 07:01  -  19 Apr 2023 07:00  --------------------------------------------------------  IN: 50 mL / OUT: 0 mL / NET: 50 mL    Weight (kg): 79 (04-17 @ 10:00)    PHYSICAL EXAM:  Appearance: Comfortable. No acute distress  HEENT:  Atraumatic. Normocephalic.  Normal oral mucosa  Neurologic: A & O x 3, no gross focal deficits.  Cardiovascular: RRR S1 S2, decreased  Respiratory: Lungs clear to auscultation, unlabored   Gastrointestinal:  Soft, Non-tender, + BS  Lower Extremities: No edema  Psychiatry: Patient is calm. No agitation.   Skin: warm and dry.      CURRENT MEDICATIONS:  MEDICATIONS  (STANDING):  aspirin  chewable 81 milliGRAM(s) Oral daily  atorvastatin 40 milliGRAM(s) Oral at bedtime  carvedilol 3.125 milliGRAM(s) Oral every 12 hours  ferrous    sulfate 325 milliGRAM(s) Oral daily  glucagon  Injectable 1 milliGRAM(s) IntraMuscular once  insulin lispro (ADMELOG) corrective regimen sliding scale   SubCutaneous three times a day before meals    LABS:	 	  CARDIAC MARKERS ( 17 Apr 2023 09:45 )  x     / x     / 35 U/L / x     / x      p-BNP 17 Apr 2023 09:45: x                              7.8    6.97  )-----------( 254      ( 19 Apr 2023 05:08 )             30.0     04-19    148<H>  |  100  |  12.7  ----------------------------<  88  3.7   |  33.0<H>  |  0.72    Ca    8.8      19 Apr 2023 05:08  Phos  3.9     04-19  Mg     1.9     04-19    TPro  6.4<L>  /  Alb  3.1<L>  /  TBili  1.6  /  DBili  x   /  AST  14  /  ALT  12  /  AlkPhos  271<H>  04-19    HgA1c:   TSH: Thyroid Stimulating Hormone, Serum: 1.14 uIU/mL  Thyroid Stimulating Hormone, Serum: 0.64 uIU/mL    TELEMETRY: sinus zahra to sinus rhythmn    DIAGNOSTIC TESTING:  [ ] Echocardiogram: < from: TTE Echo Complete w/o Contrast w/ Doppler (04.16.23 @ 15:09) >  Summary:   1. Endocardium opacified with intravenous echocontrast, no evidence of   LV thrombus.   2. Left ventricular ejection fraction, by visual estimation, is 20 to   25%.   3. LV Ejection Fraction by Fung's Method with a biplane EF of 23 %.   4. Dilated cardiomyopathy.   5. Spectral Doppler shows pseudonormal pattern of left ventricular   myocardial filling (Grade II diastolic dysfunction).   6. Mildly enlarged right ventricle with mildlyreduced RV systolic   function.   7. Estimated pulmonary artery systolic pressure is 48.6 mmHg assuming a   right atrial pressure of 15 mmHg, which is consistent with mild pulmonary   hypertension.   8. Moderately enlarged left atrium.   9. Mildly enlarged right atrium.  10. Mild thickening of the anterior and posterior mitral valve leaflets.  11. Moderate mitral valve regurgitation.  12. Mild tricuspid regurgitation.  13. Sclerotic aortic valve with normal opening.  14. There is no evidence of pericardial effusion.  15. Compared to the prior TTE study from 12/14/20, overall no significant   change.    < from: Cardiac Cath Lab - Adult (12.18.20 @ 11:09) >  demonstrated an EF of 25 %.  CORONARY VESSELS: The coronary circulation is right dominant.  LM:   --  LM: Normal.  LAD:   --  Mid LAD: There was a 80 % stenosis. The lesion was heavily  calcified.  CX:   --  Circumflex: Normal.  RCA:   --  Mid RCA: There was a 70 % stenosis. The lesion was moderately  calcified.  COMPLICATIONS: No complications occurred during the cath lab visit.  SUMMARY:  HEMODYNAMICS: Hemodynamic assessment demonstrates severely elevated LVEDP.  DIAGNOSTIC IMPRESSIONS: Rotational atherectomy and PCI performed to LAD  with 2 HERMES.  High LVEDP to start- patient with symptoms of CHF during procedure. RCA PCI  initially planned but aborted due to symptoms of chf

## 2023-04-19 NOTE — PROGRESS NOTE ADULT - PROBLEM SELECTOR PROBLEM 1
Acute HFrEF (heart failure with reduced ejection fraction)

## 2023-04-19 NOTE — PHYSICAL THERAPY INITIAL EVALUATION ADULT - ADDITIONAL COMMENTS
per patient she lives with her son who can assist. Pt uses a RW at baseline and states she is without steps to enter the home.

## 2023-04-19 NOTE — PROGRESS NOTE ADULT - NS ATTEND AMEND GEN_ALL_CORE FT
67 y/o F with history of HFrEF (LVEF 20-25%, previously 25-30% 12/2020), ICM, substance use (Utox +cocaine on admission), CAD s/p HERMES x2 to mLAD 12/2020 with residual 70% RCA disease admitted with complaints of dyspnea and LE edema. Currently being treated for acute HFrEF, respiratory failure, metabolic encephalopathy. She was transferred to MICU this AM due to decreased responsiveness, currently on narcan gtt.   -At time of exam, patient was verbally aggressive, yelling "no" and "don't touch me". Would not allow physical exam.   -8L urine output over 24 hours (intake not documented)   -Continue lasix 40mg IV bid today, will attempt to examine patient tomorrow   -Currently NPO; start GDMT as tolerated once able to tolerate PO (coreg [avoid toprol in the setting of cocaine use], losartan vs. entresto, spironolactone, farxiga)   -ASA, statin once able to tolerate PO- ASA as long as H/H remains stable and there is no acute bleeding   -Patient noncompliant with poor mental status. Continue conservative medical therapy.
69 y/o F with history of HFrEF (LVEF 20-25%, previously 25-30% 12/2020), ICM, substance use (Utox +cocaine on admission), CAD s/p HERMES x2 to mLAD 12/2020 with residual 70% RCA disease admitted with complaints of dyspnea and LE edema. Currently being treated for acute HFrEF, respiratory failure, metabolic encephalopathy. Hospital course was complicated by decreased responsiveness requiring narcan gtt.   -Patient now transferred out of ICU. AAO x 2 at time of exam, responding yes or no to questioning, denies any current complaints.   -Tachycardia has resolved, HR range 70s-80s today   -Start lasix 20mg po daily tomorrow   -Add GDMT as tolerated. Currently on coreg. Start losartan 25mg daily today. Add spironolactone, farxiga prior to d/c. Unsure of patient's compliance and ability to afford meds.    -Continue ASA, statin  -Patient not a candidate for any further cardiac workup due to poor mental status and noncompliance. Continue conservative medical therapy.    No further inpatient cardiac workup needed at this time   Please reconsult cardiology with any further questions or new cardiac issues
67 y/o F with history of HFrEF (LVEF 20-25%, previously 25-30% 12/2020), ICM, substance use (Utox +cocaine on admission), CAD s/p HERMES x2 to mLAD 12/2020 with residual 70% RCA disease admitted with complaints of dyspnea and LE edema. Currently being treated for acute HFrEF, respiratory failure, metabolic encephalopathy. She was transferred to MICU yesterday due to decreased responsiveness requiring narcan gtt. Mental status reportedly improved, but became aggressive this AM and per her nurse required haldol. At time of my exam, she is completely lethargic, AAO x 0, unable to follow commands.   -Tachycardic with episodes of ST and non sustained SVT on telemetry   -Patient hypokalemic, hypomagnesemic. Monitor electrolytes and maintain K>4, Mg>2  -Net -2L today. Appears euvolemic to dry on exam. D/c IV lasix   -Continue coreg for episodes of SVT. Can administer IV lopressor 5mg for any episodes of sustained SVT if not tolerating PO   -Add GDMT as tolerated- coreg [avoid toprol in the setting of cocaine use], losartan vs. entresto, spironolactone, farxiga. Unsure of patient's compliance and ability to afford meds.    -Continue ASA, statin  -Patient not a candidate for any further cardiac workup due to poor mental status and noncompliance. Continue conservative medical therapy.

## 2023-04-19 NOTE — PROGRESS NOTE ADULT - ASSESSMENT
69yo F with PMHx of HFrED, substance abuse, HTN, DM admitted for decompensated HF requiring AVAPs course complicated by acute toxic metabolic encephalopathy requiring multiple doses Narcan with transient improvement then pt was RRT on 4/17 for worsening mentation/obtunded with worsening hypoxic respiratory failure upgraded to MICU for Narcan gtt clinical concerns for  long-acting synthetic opioid abuse.    Acute Toxic metabolic encephalopathy, improving  -likely due to substance abuse, concerns for long-acting synthetic opioid use   -s/p Narcan infusion   -mental status improved  -cont to keep NPO for now and SLP eval today  -monitor closely     Sinus tachycardia, improved  -HR 140s on monitor, EKG with sinus tachy   -monitor HR closely  - Continue coreg 3.125 mg BID  -cardiology following    Hypernatremia  - likely due to npo status and diuresis  - monitor off lasix and hopefully speech will clear for diet    Acute decompensated HFrEF , improved  -off AVAPs, sating well on 2L NC   -stop IV diuresis, bicarb increasing   -daily weight, strict I/O  -cardiology following     CAD  Hx 12/2022 mLAD x2, RCA 70%  - statin  - continue asa and monitor hgb    Hypokalemia and hypomagnesemia, resolved  - replaced    DM  -ISS, hypoglycemic protocol  -A1c 7.2     Microcytic anemia due to iron def  - po iron    DVT ppx  scd     Dispo: remain inpt on tele, PT eval. SW consult to help locate family.

## 2023-04-19 NOTE — PROGRESS NOTE ADULT - ASSESSMENT
69 y/o F with a PMHx of CAD s/p HERMES x 2 mLAD (12/2020, with residual 70% RCA disease), HFrEF (EF 25-30%), substance abuse, HTN, HLD, DMII, and BEATRICE who presented to the ED with complaints of shortness of breath and leg swelling. During hospital stay patient had episodes of agitation and decreased loc requiring Narcan.  Haldol given for agitation Currently on 1:1 observation due to agitation     absent

## 2023-04-19 NOTE — SWALLOW BEDSIDE ASSESSMENT ADULT - COMMENTS
As per MD Note: "67yo F with PMHx of HFrED, substance abuse, HTN, DM admitted for decompensated HF requiring AVAPs course complicated by acute toxic metabolic encephalopathy requiring multiple doses Narcan with transient improvement then pt was RRT on 4/17 for worsening mentation/obtunded with worsening hypoxic respiratory failure upgraded to MICU for Narcan gtt clinical concerns for  long-acting synthetic opioid abuse."

## 2023-04-20 LAB
ALBUMIN SERPL ELPH-MCNC: 2.8 G/DL — LOW (ref 3.3–5.2)
ALP SERPL-CCNC: 238 U/L — HIGH (ref 40–120)
ALT FLD-CCNC: 11 U/L — SIGNIFICANT CHANGE UP
ANION GAP SERPL CALC-SCNC: 7 MMOL/L — SIGNIFICANT CHANGE UP (ref 5–17)
AST SERPL-CCNC: 14 U/L — SIGNIFICANT CHANGE UP
BILIRUB SERPL-MCNC: 1.1 MG/DL — SIGNIFICANT CHANGE UP (ref 0.4–2)
BUN SERPL-MCNC: 14.2 MG/DL — SIGNIFICANT CHANGE UP (ref 8–20)
CALCIUM SERPL-MCNC: 8 MG/DL — LOW (ref 8.4–10.5)
CHLORIDE SERPL-SCNC: 97 MMOL/L — SIGNIFICANT CHANGE UP (ref 96–108)
CO2 SERPL-SCNC: 38 MMOL/L — HIGH (ref 22–29)
CREAT SERPL-MCNC: 0.63 MG/DL — SIGNIFICANT CHANGE UP (ref 0.5–1.3)
EGFR: 95 ML/MIN/1.73M2 — SIGNIFICANT CHANGE UP
GLUCOSE BLDC GLUCOMTR-MCNC: 125 MG/DL — HIGH (ref 70–99)
GLUCOSE BLDC GLUCOMTR-MCNC: 133 MG/DL — HIGH (ref 70–99)
GLUCOSE BLDC GLUCOMTR-MCNC: 174 MG/DL — HIGH (ref 70–99)
GLUCOSE BLDC GLUCOMTR-MCNC: 180 MG/DL — HIGH (ref 70–99)
GLUCOSE SERPL-MCNC: 189 MG/DL — HIGH (ref 70–99)
HCT VFR BLD CALC: 27.8 % — LOW (ref 34.5–45)
HGB BLD-MCNC: 7.1 G/DL — LOW (ref 11.5–15.5)
MAGNESIUM SERPL-MCNC: 1.9 MG/DL — SIGNIFICANT CHANGE UP (ref 1.6–2.6)
MCHC RBC-ENTMCNC: 16.3 PG — LOW (ref 27–34)
MCHC RBC-ENTMCNC: 25.5 GM/DL — LOW (ref 32–36)
MCV RBC AUTO: 63.8 FL — LOW (ref 80–100)
PLATELET # BLD AUTO: 239 K/UL — SIGNIFICANT CHANGE UP (ref 150–400)
POTASSIUM SERPL-MCNC: 3.1 MMOL/L — LOW (ref 3.5–5.3)
POTASSIUM SERPL-SCNC: 3.1 MMOL/L — LOW (ref 3.5–5.3)
PROT SERPL-MCNC: 5.7 G/DL — LOW (ref 6.6–8.7)
RBC # BLD: 4.36 M/UL — SIGNIFICANT CHANGE UP (ref 3.8–5.2)
RBC # FLD: 25.7 % — HIGH (ref 10.3–14.5)
RPR SER-TITR: (no result)
RPR SERPL-ACNC: REACTIVE
SODIUM SERPL-SCNC: 142 MMOL/L — SIGNIFICANT CHANGE UP (ref 135–145)
T PALLIDUM AB TITR SER: POSITIVE
WBC # BLD: 7.59 K/UL — SIGNIFICANT CHANGE UP (ref 3.8–10.5)
WBC # FLD AUTO: 7.59 K/UL — SIGNIFICANT CHANGE UP (ref 3.8–10.5)

## 2023-04-20 PROCEDURE — 99233 SBSQ HOSP IP/OBS HIGH 50: CPT

## 2023-04-20 PROCEDURE — 99223 1ST HOSP IP/OBS HIGH 75: CPT

## 2023-04-20 RX ORDER — POTASSIUM CHLORIDE 20 MEQ
40 PACKET (EA) ORAL EVERY 4 HOURS
Refills: 0 | Status: DISCONTINUED | OUTPATIENT
Start: 2023-04-20 | End: 2023-04-20

## 2023-04-20 RX ORDER — IRON SUCROSE 20 MG/ML
200 INJECTION, SOLUTION INTRAVENOUS EVERY 24 HOURS
Refills: 0 | Status: DISCONTINUED | OUTPATIENT
Start: 2023-04-20 | End: 2023-04-22

## 2023-04-20 RX ORDER — SPIRONOLACTONE 25 MG/1
25 TABLET, FILM COATED ORAL DAILY
Refills: 0 | Status: DISCONTINUED | OUTPATIENT
Start: 2023-04-20 | End: 2023-04-22

## 2023-04-20 RX ORDER — POTASSIUM CHLORIDE 20 MEQ
40 PACKET (EA) ORAL EVERY 4 HOURS
Refills: 0 | Status: COMPLETED | OUTPATIENT
Start: 2023-04-20 | End: 2023-04-20

## 2023-04-20 RX ORDER — FUROSEMIDE 40 MG
40 TABLET ORAL DAILY
Refills: 0 | Status: DISCONTINUED | OUTPATIENT
Start: 2023-04-20 | End: 2023-04-20

## 2023-04-20 RX ORDER — MAGNESIUM SULFATE 500 MG/ML
2 VIAL (ML) INJECTION ONCE
Refills: 0 | Status: COMPLETED | OUTPATIENT
Start: 2023-04-20 | End: 2023-04-20

## 2023-04-20 RX ADMIN — LOSARTAN POTASSIUM 25 MILLIGRAM(S): 100 TABLET, FILM COATED ORAL at 05:52

## 2023-04-20 RX ADMIN — Medication 650 MILLIGRAM(S): at 21:35

## 2023-04-20 RX ADMIN — IRON SUCROSE 110 MILLIGRAM(S): 20 INJECTION, SOLUTION INTRAVENOUS at 11:05

## 2023-04-20 RX ADMIN — CHLORHEXIDINE GLUCONATE 1 APPLICATION(S): 213 SOLUTION TOPICAL at 08:51

## 2023-04-20 RX ADMIN — Medication 40 MILLIEQUIVALENT(S): at 10:26

## 2023-04-20 RX ADMIN — Medication 650 MILLIGRAM(S): at 22:35

## 2023-04-20 RX ADMIN — Medication 1: at 08:52

## 2023-04-20 RX ADMIN — Medication 40 MILLIEQUIVALENT(S): at 13:23

## 2023-04-20 RX ADMIN — Medication 25 GRAM(S): at 11:56

## 2023-04-20 RX ADMIN — Medication 3 MILLIGRAM(S): at 00:53

## 2023-04-20 RX ADMIN — Medication 20 MILLIGRAM(S): at 05:52

## 2023-04-20 RX ADMIN — Medication 81 MILLIGRAM(S): at 11:56

## 2023-04-20 RX ADMIN — CHLORHEXIDINE GLUCONATE 1 APPLICATION(S): 213 SOLUTION TOPICAL at 05:54

## 2023-04-20 RX ADMIN — ATORVASTATIN CALCIUM 40 MILLIGRAM(S): 80 TABLET, FILM COATED ORAL at 21:36

## 2023-04-20 RX ADMIN — CARVEDILOL PHOSPHATE 3.12 MILLIGRAM(S): 80 CAPSULE, EXTENDED RELEASE ORAL at 05:52

## 2023-04-20 NOTE — PROGRESS NOTE ADULT - SUBJECTIVE AND OBJECTIVE BOX
ROSENDA PHILLIPS    [id number]    68y      Female    INTERVAL HPI/OVERNIGHT EVENTS: patient being seen for chf and encephalopathy. Patient seen at bedside and states feeling stronger.     overnight -    	RPR reactive 1:8 ratio and syphyllis positive    REVIEW OF SYSTEMS:    CONSTITUTIONAL:  fatigue  RESPIRATORY: No cough, wheezing, hemoptysis; No shortness of breath  CARDIOVASCULAR: No chest pain, palpitations  GASTROINTESTINAL: No abdominal or epigastric pain. No nausea, vomiting  NEUROLOGICAL: No headaches, memory loss, loss of strength.  MISCELLANEOUS:      Vital Signs Last 24 Hrs  T(C): 36.5 (20 Apr 2023 10:15), Max: 37.1 (19 Apr 2023 20:00)  T(F): 97.7 (20 Apr 2023 10:15), Max: 98.8 (19 Apr 2023 20:00)  HR: 66 (20 Apr 2023 10:15) (66 - 74)  BP: 94/64 (20 Apr 2023 10:15) (94/64 - 133/62)  BP(mean): --  RR: 18 (20 Apr 2023 10:15) (18 - 18)  SpO2: 98% (20 Apr 2023 10:15) (98% - 100%)    Parameters below as of 20 Apr 2023 10:15  Patient On (Oxygen Delivery Method): nasal cannula        PHYSICAL EXAM:    CONSTITUTIONAL: NAD  ENMT: Moist oral mucosa, no pharyngeal injection or exudates;  RESPIRATORY: Normal respiratory effort; lungs are clear to auscultation bilaterally, decreased breath sounds in the bases  CARDIOVASCULAR: Regular rate and rhythm, normal S1 and S2, no murmur/rub/gallop; trace lower extremity edema;  ABDOMEN: Nontender to palpation, normoactive bowel sounds, no rebound/guarding;  MUSCLOSKELETAL:  no joint swelling or tenderness to palpation  PSYCH: A+O to person and place, affect appropriate  NEUROLOGY: CN 2-12 are intact and symmetric; no gross sensory deficits;   SKIN: No rashes; no palpable lesions        LABS:                        7.1    7.59  )-----------( 239      ( 20 Apr 2023 06:30 )             27.8     04-20    142  |  97  |  14.2  ----------------------------<  189<H>  3.1<L>   |  38.0<H>  |  0.63    Ca    8.0<L>      20 Apr 2023 06:30  Phos  3.9     04-19  Mg     1.9     04-20    TPro  5.7<L>  /  Alb  2.8<L>  /  TBili  1.1  /  DBili  x   /  AST  14  /  ALT  11  /  AlkPhos  238<H>  04-20      MEDICATIONS  (STANDING):  aspirin  chewable 81 milliGRAM(s) Oral daily  atorvastatin 40 milliGRAM(s) Oral at bedtime  carvedilol 3.125 milliGRAM(s) Oral every 12 hours  chlorhexidine 2% Cloths 1 Application(s) Topical <User Schedule>  chlorhexidine 4% Liquid 1 Application(s) Topical <User Schedule>  dextrose 5%. 1000 milliLiter(s) (50 mL/Hr) IV Continuous <Continuous>  dextrose 5%. 1000 milliLiter(s) (100 mL/Hr) IV Continuous <Continuous>  dextrose 50% Injectable 25 Gram(s) IV Push once  dextrose 50% Injectable 12.5 Gram(s) IV Push once  dextrose 50% Injectable 25 Gram(s) IV Push once  furosemide    Tablet 20 milliGRAM(s) Oral daily  glucagon  Injectable 1 milliGRAM(s) IntraMuscular once  insulin lispro (ADMELOG) corrective regimen sliding scale   SubCutaneous three times a day before meals  iron sucrose IVPB 200 milliGRAM(s) IV Intermittent every 24 hours  losartan 25 milliGRAM(s) Oral daily  magnesium sulfate  IVPB 2 Gram(s) IV Intermittent once  potassium chloride   Powder 40 milliEquivalent(s) Oral every 4 hours  spironolactone 25 milliGRAM(s) Oral daily    MEDICATIONS  (PRN):  acetaminophen     Tablet .. 650 milliGRAM(s) Oral every 6 hours PRN Temp greater or equal to 38C (100.4F), Mild Pain (1 - 3)  aluminum hydroxide/magnesium hydroxide/simethicone Suspension 30 milliLiter(s) Oral every 4 hours PRN Dyspepsia  dextrose Oral Gel 15 Gram(s) Oral once PRN Blood Glucose LESS THAN 70 milliGRAM(s)/deciliter  haloperidol    Injectable 5 milliGRAM(s) IV Push every 6 hours PRN Agitation  melatonin 3 milliGRAM(s) Oral at bedtime PRN Insomnia      RADIOLOGY & ADDITIONAL TESTS:

## 2023-04-20 NOTE — CONSULT NOTE ADULT - SUBJECTIVE AND OBJECTIVE BOX
INFECTIOUS DISEASES AND INTERNAL MEDICINE at [place]  =======================================================  Berto Morley MD  Diplomates American Board of Internal Medicine and Infectious Diseases  Telephone [phone]  Fax            [phone]  =======================================================    ROSENDA [id number]      HPI:  69 y/o F w/ PMH of HFrEF (EF 25% in ), substance abuse, HTN, HLD, DMII came in c/o sob and leg swelling.  Unable to obtain hx from pt as pt is obtundant.  Hx obtained from records.  In the ED pt was noted to have increased wob and volume overloaded and was given IV diuresis and placed on AVAPs.    Records show pts last TTE was from  and EF at that time noted to be 25%.  PT WAS GIVEN NARCAN  PT WITH ENCEPHALOPATHY ADN CHF  POS RPR 1:8   ASKED TO EVALUATE FROM ID STANDPOINT            PAST MEDICAL & SURGICAL HISTORY:  HTN (hypertension)      Hyperlipidemia      Type 2 diabetes mellitus      Depression      Anxiety      Insomnia      Morbid obesity with BMI of 40.0-44.9, adult      BEATRICE (obstructive sleep apnea)      Osteoarthritis      Endometrial hyperplasia      S/P  section  x 1      S/P shoulder surgery  right 1985          ANTIBIOTICS      Allergies    No Known Allergies    Intolerances        SOCIAL HISTORY:     FAMILY HX   FAMILY HISTORY:  No pertinent family history in first degree relatives        Vital Signs Last 24 Hrs  T(C): 36.5 (2023 10:15), Max: 37.1 (2023 20:00)  T(F): 97.7 (2023 10:15), Max: 98.8 (2023 20:00)  HR: 66 (2023 10:15) (66 - 74)  BP: 94/64 (2023 10:15) (94/64 - 133/62)  BP(mean): --  RR: 18 (2023 10:15) (18 - 18)  SpO2: 98% (2023 10:15) (98% - 100%)    Parameters below as of 2023 10:15  Patient On (Oxygen Delivery Method): nasal cannula      Drug Dosing Weight  Height (cm): 157.5 (2023 10:00)  Weight (kg): 79 (2023 10:00)  BMI (kg/m2): 31.8 (2023 10:00)  BSA (m2): 1.8 (2023 10:00)      REVIEW OF SYSTEMS:    POOR HISTORIAN                 PHYSICAL EXAMINATION:    GENERAL: The patient is a _____in no apparent distress. ___     VITAL SIGNS: T(C): 36.5 (23 @ 10:15), Max: 37.1 (23 @ 20:00)  HR: 66 (23 @ 10:15) (66 - 74)  BP: 94/64 (23 @ 10:15) (94/64 - 133/62)  RR: 18 (23 @ 10:15) (18 - 18)  SpO2: 98% (23 @ 10:15) (98% - 100%)  Wt(kg): --    HEENT: Head is normocephalic and atraumatic.  ANICTERIC  NECK: Supple. No carotid bruits.  No lymphadenopathy or thyromegaly.    LUNGS:COARSE BREATH SOUNDS    HEART: Regular rate and rhythm without murmur.    ABDOMEN: Soft, nontender, and nondistended.  Positive bowel sounds.  No hepatosplenomegaly was noted. NO REBOUND NO GUARDING    EXTREMITIES: NO EDEMA NO ERYTHEMA     NEUROLOGIC: NON FOCAL CONFUSED       SKIN: No ulceration or induration present. NO RASH        BLOOD CULTURES  Culture Results:   <10,000 CFU/mL Normal Urogenital Kalie ( @ 09:30)  Culture Results:   No growth to date. ( @ 08:52)  Culture Results:   No growth to date. ( @ 08:50)       URINE CX          LABS:                        7.1    7.59  )-----------( 239      ( 2023 06:30 )             27.8     04-20    142  |  97  |  14.2  ----------------------------<  189<H>  3.1<L>   |  38.0<H>  |  0.63    Ca    8.0<L>      2023 06:30  Phos  3.9     04-19  Mg     1.9     04-20    TPro  5.7<L>  /  Alb  2.8<L>  /  TBili  1.1  /  DBili  x   /  AST  14  /  ALT  11  /  AlkPhos  238<H>  04-20          RADIOLOGY & ADDITIONAL STUDIES:      ASSESSMENT/PLAN  69 y/o F w/ PMH of HFrEF (EF 25% in ), substance abuse, HTN, HLD, DMII came in c/o sob and leg swelling.  Unable to obtain hx from pt as pt is obtundant.  Hx obtained from records.  In the ED pt was noted to have increased wob and volume overloaded and was given IV diuresis and placed on AVAPs.    Records show pts last TTE was from  and EF at that time noted to be 25%.  PT WAS GIVEN NARCAN  PT WITH ENCEPHALOPATHY AND CHF  POS RPR 1:8   I SPOKE TO PARAS PT HAS  A LONG HX OF POSITIVE RPR IN THE PAST  PT WAS LAST ADMITTED IN 2022 RPR 1:8    TO [facility] HAD AN LP  NEG  VDRL   WAS TREATED FOR 14 DAYS  PCN  AS PER PARAS NO FURTHER NEED FOR TX AT THIS TIME  WILL FOLLOWUP AS NEEDED   PLEASE CALL IF QUESTIONS                  ALISA FERNANDEZ MD

## 2023-04-20 NOTE — PROGRESS NOTE ADULT - ASSESSMENT
67yo F with PMHx of HFrED, substance abuse, HTN, DM admitted for decompensated HF requiring AVAPs course complicated by acute toxic metabolic encephalopathy requiring multiple doses Narcan with transient improvement then pt was RRT on 4/17 for worsening mentation/obtunded with worsening hypoxic respiratory failure upgraded to MICU for Narcan gtt clinical concerns for  long-acting synthetic opioid abuse.    Acute Toxic metabolic encephalopathy, improving  -likely due to substance abuse, concerns for long-acting synthetic opioid use   -s/p Narcan infusion   -mental status improved  -rpr is positive  - team spoke to ID may need LP     anemia - may need transfusion tomorrow  - type and screen for am     Sinus tachycardia, improved  - Continue coreg 3.125 mg BID    Hypernatremia  resolved  hypokalemia - repleted    Acute decompensated HFrEF , improved  -off AVAPs, sating well on 2L NC   -cardiology following     CAD  Hx 12/2022 mLAD x2, RCA 70%  - statin  - continue asa    DM  -ISS, hypoglycemic protocol  -A1c 7.2     DVT ppx  scd

## 2023-04-21 LAB
ALBUMIN SERPL ELPH-MCNC: 2.7 G/DL — LOW (ref 3.3–5.2)
ALP SERPL-CCNC: 243 U/L — HIGH (ref 40–120)
ALT FLD-CCNC: 13 U/L — SIGNIFICANT CHANGE UP
ANION GAP SERPL CALC-SCNC: 9 MMOL/L — SIGNIFICANT CHANGE UP (ref 5–17)
ANISOCYTOSIS BLD QL: SLIGHT — SIGNIFICANT CHANGE UP
AST SERPL-CCNC: 20 U/L — SIGNIFICANT CHANGE UP
BASOPHILS # BLD AUTO: 0 K/UL — SIGNIFICANT CHANGE UP (ref 0–0.2)
BASOPHILS NFR BLD AUTO: 0 % — SIGNIFICANT CHANGE UP (ref 0–2)
BILIRUB SERPL-MCNC: 0.9 MG/DL — SIGNIFICANT CHANGE UP (ref 0.4–2)
BLD GP AB SCN SERPL QL: SIGNIFICANT CHANGE UP
BUN SERPL-MCNC: 10.6 MG/DL — SIGNIFICANT CHANGE UP (ref 8–20)
CALCIUM SERPL-MCNC: 8.3 MG/DL — LOW (ref 8.4–10.5)
CHLORIDE SERPL-SCNC: 98 MMOL/L — SIGNIFICANT CHANGE UP (ref 96–108)
CHOLEST SERPL-MCNC: 114 MG/DL — SIGNIFICANT CHANGE UP
CO2 SERPL-SCNC: 35 MMOL/L — HIGH (ref 22–29)
CREAT SERPL-MCNC: 0.61 MG/DL — SIGNIFICANT CHANGE UP (ref 0.5–1.3)
CULTURE RESULTS: SIGNIFICANT CHANGE UP
CULTURE RESULTS: SIGNIFICANT CHANGE UP
DACRYOCYTES BLD QL SMEAR: SLIGHT — SIGNIFICANT CHANGE UP
EGFR: 96 ML/MIN/1.73M2 — SIGNIFICANT CHANGE UP
ELLIPTOCYTES BLD QL SMEAR: SLIGHT — SIGNIFICANT CHANGE UP
EOSINOPHIL # BLD AUTO: 0.06 K/UL — SIGNIFICANT CHANGE UP (ref 0–0.5)
EOSINOPHIL NFR BLD AUTO: 0.9 % — SIGNIFICANT CHANGE UP (ref 0–6)
GIANT PLATELETS BLD QL SMEAR: PRESENT — SIGNIFICANT CHANGE UP
GLUCOSE BLDC GLUCOMTR-MCNC: 135 MG/DL — HIGH (ref 70–99)
GLUCOSE BLDC GLUCOMTR-MCNC: 155 MG/DL — HIGH (ref 70–99)
GLUCOSE BLDC GLUCOMTR-MCNC: 244 MG/DL — HIGH (ref 70–99)
GLUCOSE SERPL-MCNC: 165 MG/DL — HIGH (ref 70–99)
HCT VFR BLD CALC: 31.1 % — LOW (ref 34.5–45)
HDLC SERPL-MCNC: 47 MG/DL — LOW
HGB BLD-MCNC: 7.7 G/DL — LOW (ref 11.5–15.5)
HYPOCHROMIA BLD QL: SLIGHT — SIGNIFICANT CHANGE UP
INR BLD: 1.25 RATIO — HIGH (ref 0.88–1.16)
LIPID PNL WITH DIRECT LDL SERPL: 57 MG/DL — SIGNIFICANT CHANGE UP
LYMPHOCYTES # BLD AUTO: 0.72 K/UL — LOW (ref 1–3.3)
LYMPHOCYTES # BLD AUTO: 10.6 % — LOW (ref 13–44)
MACROCYTES BLD QL: SLIGHT — SIGNIFICANT CHANGE UP
MAGNESIUM SERPL-MCNC: 2.1 MG/DL — SIGNIFICANT CHANGE UP (ref 1.6–2.6)
MANUAL SMEAR VERIFICATION: SIGNIFICANT CHANGE UP
MCHC RBC-ENTMCNC: 16.2 PG — LOW (ref 27–34)
MCHC RBC-ENTMCNC: 24.8 GM/DL — LOW (ref 32–36)
MCV RBC AUTO: 65.3 FL — LOW (ref 80–100)
MICROCYTES BLD QL: SLIGHT — SIGNIFICANT CHANGE UP
MONOCYTES # BLD AUTO: 0.54 K/UL — SIGNIFICANT CHANGE UP (ref 0–0.9)
MONOCYTES NFR BLD AUTO: 8 % — SIGNIFICANT CHANGE UP (ref 2–14)
MYELOCYTES NFR BLD: 0.9 % — HIGH (ref 0–0)
NEUTROPHILS # BLD AUTO: 5.4 K/UL — SIGNIFICANT CHANGE UP (ref 1.8–7.4)
NEUTROPHILS NFR BLD AUTO: 78.7 % — HIGH (ref 43–77)
NEUTS BAND # BLD: 0.9 % — SIGNIFICANT CHANGE UP (ref 0–8)
NON HDL CHOLESTEROL: 67 MG/DL — SIGNIFICANT CHANGE UP
PLAT MORPH BLD: NORMAL — SIGNIFICANT CHANGE UP
PLATELET # BLD AUTO: 219 K/UL — SIGNIFICANT CHANGE UP (ref 150–400)
POIKILOCYTOSIS BLD QL AUTO: SLIGHT — SIGNIFICANT CHANGE UP
POLYCHROMASIA BLD QL SMEAR: SLIGHT — SIGNIFICANT CHANGE UP
POTASSIUM SERPL-MCNC: 3.8 MMOL/L — SIGNIFICANT CHANGE UP (ref 3.5–5.3)
POTASSIUM SERPL-SCNC: 3.8 MMOL/L — SIGNIFICANT CHANGE UP (ref 3.5–5.3)
PROT SERPL-MCNC: 6.2 G/DL — LOW (ref 6.6–8.7)
PROTHROM AB SERPL-ACNC: 14.5 SEC — HIGH (ref 10.5–13.4)
RBC # BLD: 4.76 M/UL — SIGNIFICANT CHANGE UP (ref 3.8–5.2)
RBC # FLD: 26.7 % — HIGH (ref 10.3–14.5)
RBC BLD AUTO: ABNORMAL
SCHISTOCYTES BLD QL AUTO: SLIGHT — SIGNIFICANT CHANGE UP
SODIUM SERPL-SCNC: 142 MMOL/L — SIGNIFICANT CHANGE UP (ref 135–145)
SPECIMEN SOURCE: SIGNIFICANT CHANGE UP
SPECIMEN SOURCE: SIGNIFICANT CHANGE UP
TARGETS BLD QL SMEAR: SIGNIFICANT CHANGE UP
TRIGL SERPL-MCNC: 50 MG/DL — SIGNIFICANT CHANGE UP
WBC # BLD: 6.78 K/UL — SIGNIFICANT CHANGE UP (ref 3.8–10.5)
WBC # FLD AUTO: 6.78 K/UL — SIGNIFICANT CHANGE UP (ref 3.8–10.5)

## 2023-04-21 PROCEDURE — 99233 SBSQ HOSP IP/OBS HIGH 50: CPT

## 2023-04-21 RX ADMIN — CARVEDILOL PHOSPHATE 3.12 MILLIGRAM(S): 80 CAPSULE, EXTENDED RELEASE ORAL at 17:46

## 2023-04-21 RX ADMIN — LOSARTAN POTASSIUM 25 MILLIGRAM(S): 100 TABLET, FILM COATED ORAL at 05:19

## 2023-04-21 RX ADMIN — CARVEDILOL PHOSPHATE 3.12 MILLIGRAM(S): 80 CAPSULE, EXTENDED RELEASE ORAL at 05:18

## 2023-04-21 RX ADMIN — Medication 2: at 13:09

## 2023-04-21 RX ADMIN — Medication 20 MILLIGRAM(S): at 05:19

## 2023-04-21 RX ADMIN — Medication 3 MILLIGRAM(S): at 22:22

## 2023-04-21 RX ADMIN — CHLORHEXIDINE GLUCONATE 1 APPLICATION(S): 213 SOLUTION TOPICAL at 09:11

## 2023-04-21 RX ADMIN — SPIRONOLACTONE 25 MILLIGRAM(S): 25 TABLET, FILM COATED ORAL at 05:19

## 2023-04-21 RX ADMIN — ATORVASTATIN CALCIUM 40 MILLIGRAM(S): 80 TABLET, FILM COATED ORAL at 22:21

## 2023-04-21 RX ADMIN — CHLORHEXIDINE GLUCONATE 1 APPLICATION(S): 213 SOLUTION TOPICAL at 05:18

## 2023-04-21 RX ADMIN — IRON SUCROSE 110 MILLIGRAM(S): 20 INJECTION, SOLUTION INTRAVENOUS at 13:30

## 2023-04-21 RX ADMIN — Medication 1: at 09:10

## 2023-04-21 RX ADMIN — Medication 81 MILLIGRAM(S): at 13:30

## 2023-04-21 NOTE — PROGRESS NOTE ADULT - REASON FOR ADMISSION
heart failure

## 2023-04-21 NOTE — PROGRESS NOTE ADULT - SUBJECTIVE AND OBJECTIVE BOX
ROSENDA PHILLIPS    [id number]    68y      Female    INTERVAL HPI/OVERNIGHT EVENTS:  patient being seen for chf and anemia. patient seen at bedside and states feeling well and is on room air.       REVIEW OF SYSTEMS:    CONSTITUTIONAL: No fever, weight loss, or fatigue  RESPIRATORY: No cough, wheezing, hemoptysis; No shortness of breath  CARDIOVASCULAR: No chest pain, palpitations  GASTROINTESTINAL: No abdominal or epigastric pain. No nausea, vomiting  NEUROLOGICAL: No headaches, memory loss, loss of strength.  MISCELLANEOUS:      Vital Signs Last 24 Hrs  T(C): 36.8 (21 Apr 2023 08:23), Max: 36.8 (20 Apr 2023 15:47)  T(F): 98.3 (21 Apr 2023 08:23), Max: 98.3 (21 Apr 2023 08:23)  HR: 67 (21 Apr 2023 08:23) (63 - 76)  BP: 106/66 (21 Apr 2023 08:23) (106/66 - 113/69)  BP(mean): --  RR: 18 (21 Apr 2023 08:23) (18 - 19)  SpO2: 98% (21 Apr 2023 08:23) (98% - 100%)    Parameters below as of 21 Apr 2023 08:23  Patient On (Oxygen Delivery Method): room air        PHYSICAL EXAM:    CONSTITUTIONAL: NAD  ENMT: Moist oral mucosa, no pharyngeal injection or exudates;  RESPIRATORY: Normal respiratory effort; lungs are clear to auscultation bilaterally, decreased breath sounds in the bases  CARDIOVASCULAR: Regular rate and rhythm, normal S1 and S2, no murmur/rub/gallop; trace lower extremity edema;  ABDOMEN: Nontender to palpation, normoactive bowel sounds, no rebound/guarding;  MUSCLOSKELETAL:  no joint swelling or tenderness to palpation  PSYCH: A+O to person and place, affect appropriate  NEUROLOGY: CN 2-12 are intact and symmetric; no gross sensory deficits;   SKIN: No rashes; no palpable lesions  LABS:                        7.7    6.78  )-----------( 219      ( 21 Apr 2023 05:38 )             31.1     04-21    142  |  98  |  10.6  ----------------------------<  165<H>  3.8   |  35.0<H>  |  0.61    Ca    8.3<L>      21 Apr 2023 05:38  Mg     2.1     04-21    TPro  6.2<L>  /  Alb  2.7<L>  /  TBili  0.9  /  DBili  x   /  AST  20  /  ALT  13  /  AlkPhos  243<H>  04-21    PT/INR - ( 21 Apr 2023 05:38 )   PT: 14.5 sec;   INR: 1.25 ratio           MEDICATIONS  (STANDING):  aspirin  chewable 81 milliGRAM(s) Oral daily  atorvastatin 40 milliGRAM(s) Oral at bedtime  carvedilol 3.125 milliGRAM(s) Oral every 12 hours  chlorhexidine 2% Cloths 1 Application(s) Topical <User Schedule>  chlorhexidine 4% Liquid 1 Application(s) Topical <User Schedule>  dextrose 5%. 1000 milliLiter(s) (50 mL/Hr) IV Continuous <Continuous>  dextrose 5%. 1000 milliLiter(s) (100 mL/Hr) IV Continuous <Continuous>  dextrose 50% Injectable 25 Gram(s) IV Push once  dextrose 50% Injectable 12.5 Gram(s) IV Push once  dextrose 50% Injectable 25 Gram(s) IV Push once  furosemide    Tablet 20 milliGRAM(s) Oral daily  glucagon  Injectable 1 milliGRAM(s) IntraMuscular once  insulin lispro (ADMELOG) corrective regimen sliding scale   SubCutaneous three times a day before meals  iron sucrose IVPB 200 milliGRAM(s) IV Intermittent every 24 hours  losartan 25 milliGRAM(s) Oral daily  spironolactone 25 milliGRAM(s) Oral daily    MEDICATIONS  (PRN):  acetaminophen     Tablet .. 650 milliGRAM(s) Oral every 6 hours PRN Temp greater or equal to 38C (100.4F), Mild Pain (1 - 3)  aluminum hydroxide/magnesium hydroxide/simethicone Suspension 30 milliLiter(s) Oral every 4 hours PRN Dyspepsia  dextrose Oral Gel 15 Gram(s) Oral once PRN Blood Glucose LESS THAN 70 milliGRAM(s)/deciliter  haloperidol    Injectable 5 milliGRAM(s) IV Push every 6 hours PRN Agitation  melatonin 3 milliGRAM(s) Oral at bedtime PRN Insomnia      RADIOLOGY & ADDITIONAL TESTS:

## 2023-04-21 NOTE — PROGRESS NOTE ADULT - ASSESSMENT
67yo F with PMHx of HFrED, substance abuse, HTN, DM admitted for decompensated HF requiring AVAPs course complicated by acute toxic metabolic encephalopathy requiring multiple doses Narcan with transient improvement then pt was RRT on 4/17 for worsening mentation/obtunded with worsening hypoxic respiratory failure upgraded to MICU for Narcan gtt clinical concerns for  long-acting synthetic opioid abuse.    Acute Toxic metabolic encephalopathy, improving  -likely due to substance abuse, concerns for long-acting synthetic opioid use   -s/p Narcan infusion   -mental status improved  -rpr is positive  resolved    anemia -iron def  - improved  - c.w iv iron     Sinus tachycardia, improved  - Continue coreg 3.125 mg BID    Hypernatremia - resolved  hypokalemia - resolved    Acute decompensated HFrEF , improved  -off AVAPs, on room air  c/w lasix    CAD  Hx 12/2022 mLAD x2, RCA 70%  - statin  - continue asa    DM  -ISS, hypoglycemic protocol  -A1c 7.2     DVT ppx  scd     dispo - likely dc tomorrow

## 2023-04-21 NOTE — PROGRESS NOTE ADULT - PROVIDER SPECIALTY LIST ADULT
Cardiology
Hospitalist
Internal Medicine
Hospitalist
Internal Medicine
Critical Care
Hospitalist
Cardiology
Cardiology

## 2023-04-22 ENCOUNTER — TRANSCRIPTION ENCOUNTER (OUTPATIENT)
Age: 68
End: 2023-04-22

## 2023-04-22 VITALS
SYSTOLIC BLOOD PRESSURE: 100 MMHG | DIASTOLIC BLOOD PRESSURE: 55 MMHG | OXYGEN SATURATION: 93 % | HEART RATE: 51 BPM | TEMPERATURE: 98 F

## 2023-04-22 LAB
ALBUMIN SERPL ELPH-MCNC: 2.8 G/DL — LOW (ref 3.3–5.2)
ALP SERPL-CCNC: 213 U/L — HIGH (ref 40–120)
ALT FLD-CCNC: 11 U/L — SIGNIFICANT CHANGE UP
ANION GAP SERPL CALC-SCNC: 8 MMOL/L — SIGNIFICANT CHANGE UP (ref 5–17)
AST SERPL-CCNC: 13 U/L — SIGNIFICANT CHANGE UP
BASOPHILS # BLD AUTO: 0.06 K/UL — SIGNIFICANT CHANGE UP (ref 0–0.2)
BASOPHILS NFR BLD AUTO: 0.7 % — SIGNIFICANT CHANGE UP (ref 0–2)
BILIRUB SERPL-MCNC: 1.1 MG/DL — SIGNIFICANT CHANGE UP (ref 0.4–2)
BUN SERPL-MCNC: 8.8 MG/DL — SIGNIFICANT CHANGE UP (ref 8–20)
CALCIUM SERPL-MCNC: 8.6 MG/DL — SIGNIFICANT CHANGE UP (ref 8.4–10.5)
CHLORIDE SERPL-SCNC: 97 MMOL/L — SIGNIFICANT CHANGE UP (ref 96–108)
CO2 SERPL-SCNC: 35 MMOL/L — HIGH (ref 22–29)
CREAT SERPL-MCNC: 0.57 MG/DL — SIGNIFICANT CHANGE UP (ref 0.5–1.3)
EGFR: 98 ML/MIN/1.73M2 — SIGNIFICANT CHANGE UP
EOSINOPHIL # BLD AUTO: 0.15 K/UL — SIGNIFICANT CHANGE UP (ref 0–0.5)
EOSINOPHIL NFR BLD AUTO: 1.6 % — SIGNIFICANT CHANGE UP (ref 0–6)
GLUCOSE BLDC GLUCOMTR-MCNC: 117 MG/DL — HIGH (ref 70–99)
GLUCOSE BLDC GLUCOMTR-MCNC: 139 MG/DL — HIGH (ref 70–99)
GLUCOSE BLDC GLUCOMTR-MCNC: 206 MG/DL — HIGH (ref 70–99)
GLUCOSE SERPL-MCNC: 104 MG/DL — HIGH (ref 70–99)
HCT VFR BLD CALC: 31.9 % — LOW (ref 34.5–45)
HGB BLD-MCNC: 8.1 G/DL — LOW (ref 11.5–15.5)
IMM GRANULOCYTES NFR BLD AUTO: 0.4 % — SIGNIFICANT CHANGE UP (ref 0–0.9)
LYMPHOCYTES # BLD AUTO: 1.19 K/UL — SIGNIFICANT CHANGE UP (ref 1–3.3)
LYMPHOCYTES # BLD AUTO: 13 % — SIGNIFICANT CHANGE UP (ref 13–44)
MAGNESIUM SERPL-MCNC: 1.8 MG/DL — SIGNIFICANT CHANGE UP (ref 1.8–2.6)
MCHC RBC-ENTMCNC: 16.6 PG — LOW (ref 27–34)
MCHC RBC-ENTMCNC: 25.4 GM/DL — LOW (ref 32–36)
MCV RBC AUTO: 65.2 FL — LOW (ref 80–100)
MONOCYTES # BLD AUTO: 1.43 K/UL — HIGH (ref 0–0.9)
MONOCYTES NFR BLD AUTO: 15.6 % — HIGH (ref 2–14)
NEUTROPHILS # BLD AUTO: 6.28 K/UL — SIGNIFICANT CHANGE UP (ref 1.8–7.4)
NEUTROPHILS NFR BLD AUTO: 68.7 % — SIGNIFICANT CHANGE UP (ref 43–77)
PLATELET # BLD AUTO: 236 K/UL — SIGNIFICANT CHANGE UP (ref 150–400)
POTASSIUM SERPL-MCNC: 3.6 MMOL/L — SIGNIFICANT CHANGE UP (ref 3.5–5.3)
POTASSIUM SERPL-SCNC: 3.6 MMOL/L — SIGNIFICANT CHANGE UP (ref 3.5–5.3)
PROT SERPL-MCNC: 6.1 G/DL — LOW (ref 6.6–8.7)
RBC # BLD: 4.89 M/UL — SIGNIFICANT CHANGE UP (ref 3.8–5.2)
RBC # FLD: 27.1 % — HIGH (ref 10.3–14.5)
SODIUM SERPL-SCNC: 140 MMOL/L — SIGNIFICANT CHANGE UP (ref 135–145)
WBC # BLD: 9.15 K/UL — SIGNIFICANT CHANGE UP (ref 3.8–10.5)
WBC # FLD AUTO: 9.15 K/UL — SIGNIFICANT CHANGE UP (ref 3.8–10.5)

## 2023-04-22 PROCEDURE — 85014 HEMATOCRIT: CPT

## 2023-04-22 PROCEDURE — 70450 CT HEAD/BRAIN W/O DYE: CPT | Mod: MA

## 2023-04-22 PROCEDURE — 82947 ASSAY GLUCOSE BLOOD QUANT: CPT

## 2023-04-22 PROCEDURE — 82803 BLOOD GASES ANY COMBINATION: CPT

## 2023-04-22 PROCEDURE — 84132 ASSAY OF SERUM POTASSIUM: CPT

## 2023-04-22 PROCEDURE — 83540 ASSAY OF IRON: CPT

## 2023-04-22 PROCEDURE — 85018 HEMOGLOBIN: CPT

## 2023-04-22 PROCEDURE — 83036 HEMOGLOBIN GLYCOSYLATED A1C: CPT

## 2023-04-22 PROCEDURE — 36415 COLL VENOUS BLD VENIPUNCTURE: CPT

## 2023-04-22 PROCEDURE — 83735 ASSAY OF MAGNESIUM: CPT

## 2023-04-22 PROCEDURE — 71275 CT ANGIOGRAPHY CHEST: CPT | Mod: MA

## 2023-04-22 PROCEDURE — 86901 BLOOD TYPING SEROLOGIC RH(D): CPT

## 2023-04-22 PROCEDURE — 82330 ASSAY OF CALCIUM: CPT

## 2023-04-22 PROCEDURE — 80048 BASIC METABOLIC PNL TOTAL CA: CPT

## 2023-04-22 PROCEDURE — 85610 PROTHROMBIN TIME: CPT

## 2023-04-22 PROCEDURE — 85027 COMPLETE CBC AUTOMATED: CPT

## 2023-04-22 PROCEDURE — 86593 SYPHILIS TEST NON-TREP QUANT: CPT

## 2023-04-22 PROCEDURE — 84295 ASSAY OF SERUM SODIUM: CPT

## 2023-04-22 PROCEDURE — 71045 X-RAY EXAM CHEST 1 VIEW: CPT

## 2023-04-22 PROCEDURE — 87040 BLOOD CULTURE FOR BACTERIA: CPT

## 2023-04-22 PROCEDURE — 82140 ASSAY OF AMMONIA: CPT

## 2023-04-22 PROCEDURE — 86592 SYPHILIS TEST NON-TREP QUAL: CPT

## 2023-04-22 PROCEDURE — 84443 ASSAY THYROID STIM HORMONE: CPT

## 2023-04-22 PROCEDURE — 99239 HOSP IP/OBS DSCHRG MGMT >30: CPT

## 2023-04-22 PROCEDURE — 80307 DRUG TEST PRSMV CHEM ANLYZR: CPT

## 2023-04-22 PROCEDURE — 0225U NFCT DS DNA&RNA 21 SARSCOV2: CPT

## 2023-04-22 PROCEDURE — 94640 AIRWAY INHALATION TREATMENT: CPT

## 2023-04-22 PROCEDURE — 87086 URINE CULTURE/COLONY COUNT: CPT

## 2023-04-22 PROCEDURE — 82962 GLUCOSE BLOOD TEST: CPT

## 2023-04-22 PROCEDURE — 85025 COMPLETE CBC W/AUTO DIFF WBC: CPT

## 2023-04-22 PROCEDURE — 86900 BLOOD TYPING SEROLOGIC ABO: CPT

## 2023-04-22 PROCEDURE — 84100 ASSAY OF PHOSPHORUS: CPT

## 2023-04-22 PROCEDURE — 99285 EMERGENCY DEPT VISIT HI MDM: CPT | Mod: 25

## 2023-04-22 PROCEDURE — 80053 COMPREHEN METABOLIC PANEL: CPT

## 2023-04-22 PROCEDURE — 81001 URINALYSIS AUTO W/SCOPE: CPT

## 2023-04-22 PROCEDURE — 84484 ASSAY OF TROPONIN QUANT: CPT

## 2023-04-22 PROCEDURE — 83605 ASSAY OF LACTIC ACID: CPT

## 2023-04-22 PROCEDURE — 82728 ASSAY OF FERRITIN: CPT

## 2023-04-22 PROCEDURE — 86780 TREPONEMA PALLIDUM: CPT

## 2023-04-22 PROCEDURE — 83550 IRON BINDING TEST: CPT

## 2023-04-22 PROCEDURE — 94660 CPAP INITIATION&MGMT: CPT

## 2023-04-22 PROCEDURE — 87640 STAPH A DNA AMP PROBE: CPT

## 2023-04-22 PROCEDURE — 93005 ELECTROCARDIOGRAM TRACING: CPT

## 2023-04-22 PROCEDURE — 82435 ASSAY OF BLOOD CHLORIDE: CPT

## 2023-04-22 PROCEDURE — 80061 LIPID PANEL: CPT

## 2023-04-22 PROCEDURE — 86850 RBC ANTIBODY SCREEN: CPT

## 2023-04-22 PROCEDURE — 80299 QUANTITATIVE ASSAY DRUG: CPT

## 2023-04-22 PROCEDURE — 84466 ASSAY OF TRANSFERRIN: CPT

## 2023-04-22 PROCEDURE — 83880 ASSAY OF NATRIURETIC PEPTIDE: CPT

## 2023-04-22 PROCEDURE — 93970 EXTREMITY STUDY: CPT

## 2023-04-22 PROCEDURE — 82550 ASSAY OF CK (CPK): CPT

## 2023-04-22 PROCEDURE — 93306 TTE W/DOPPLER COMPLETE: CPT

## 2023-04-22 PROCEDURE — 82607 VITAMIN B-12: CPT

## 2023-04-22 PROCEDURE — 82746 ASSAY OF FOLIC ACID SERUM: CPT

## 2023-04-22 PROCEDURE — 87641 MR-STAPH DNA AMP PROBE: CPT

## 2023-04-22 RX ORDER — FERROUS SULFATE 325(65) MG
1 TABLET ORAL
Qty: 30 | Refills: 0
Start: 2023-04-22 | End: 2023-05-21

## 2023-04-22 RX ORDER — MAGNESIUM SULFATE 500 MG/ML
2 VIAL (ML) INJECTION ONCE
Refills: 0 | Status: COMPLETED | OUTPATIENT
Start: 2023-04-22 | End: 2023-04-22

## 2023-04-22 RX ORDER — POTASSIUM CHLORIDE 20 MEQ
40 PACKET (EA) ORAL ONCE
Refills: 0 | Status: DISCONTINUED | OUTPATIENT
Start: 2023-04-22 | End: 2023-04-22

## 2023-04-22 RX ORDER — CARVEDILOL PHOSPHATE 80 MG/1
1 CAPSULE, EXTENDED RELEASE ORAL
Qty: 0 | Refills: 0 | DISCHARGE
Start: 2023-04-22

## 2023-04-22 RX ORDER — SPIRONOLACTONE 25 MG/1
1 TABLET, FILM COATED ORAL
Qty: 0 | Refills: 0 | DISCHARGE
Start: 2023-04-22

## 2023-04-22 RX ORDER — SPIRONOLACTONE 25 MG/1
1 TABLET, FILM COATED ORAL
Qty: 30 | Refills: 0
Start: 2023-04-22 | End: 2023-05-21

## 2023-04-22 RX ORDER — LOSARTAN POTASSIUM 100 MG/1
1 TABLET, FILM COATED ORAL
Qty: 30 | Refills: 0
Start: 2023-04-22 | End: 2023-05-21

## 2023-04-22 RX ORDER — POTASSIUM CHLORIDE 20 MEQ
40 PACKET (EA) ORAL ONCE
Refills: 0 | Status: COMPLETED | OUTPATIENT
Start: 2023-04-22 | End: 2023-04-22

## 2023-04-22 RX ORDER — FUROSEMIDE 40 MG
1 TABLET ORAL
Qty: 0 | Refills: 0 | DISCHARGE
Start: 2023-04-22

## 2023-04-22 RX ORDER — DAPAGLIFLOZIN 10 MG/1
1 TABLET, FILM COATED ORAL
Qty: 30 | Refills: 0
Start: 2023-04-22 | End: 2023-05-21

## 2023-04-22 RX ADMIN — Medication 81 MILLIGRAM(S): at 17:50

## 2023-04-22 RX ADMIN — CHLORHEXIDINE GLUCONATE 1 APPLICATION(S): 213 SOLUTION TOPICAL at 05:35

## 2023-04-22 RX ADMIN — LOSARTAN POTASSIUM 25 MILLIGRAM(S): 100 TABLET, FILM COATED ORAL at 05:35

## 2023-04-22 RX ADMIN — SPIRONOLACTONE 25 MILLIGRAM(S): 25 TABLET, FILM COATED ORAL at 05:35

## 2023-04-22 RX ADMIN — CHLORHEXIDINE GLUCONATE 1 APPLICATION(S): 213 SOLUTION TOPICAL at 05:36

## 2023-04-22 RX ADMIN — Medication 20 MILLIGRAM(S): at 05:35

## 2023-04-22 RX ADMIN — Medication 40 MILLIEQUIVALENT(S): at 09:20

## 2023-04-22 RX ADMIN — Medication 2: at 13:22

## 2023-04-22 RX ADMIN — CARVEDILOL PHOSPHATE 3.12 MILLIGRAM(S): 80 CAPSULE, EXTENDED RELEASE ORAL at 17:50

## 2023-04-22 RX ADMIN — IRON SUCROSE 110 MILLIGRAM(S): 20 INJECTION, SOLUTION INTRAVENOUS at 17:50

## 2023-04-22 RX ADMIN — CARVEDILOL PHOSPHATE 3.12 MILLIGRAM(S): 80 CAPSULE, EXTENDED RELEASE ORAL at 05:35

## 2023-04-22 NOTE — DISCHARGE NOTE PROVIDER - NSDCMRMEDTOKEN_GEN_ALL_CORE_FT
albuterol 90 mcg/inh inhalation aerosol: 1 puff(s) inhaled every 4 hours, As needed, Shortness of Breath and/or Wheezing  aspirin 81 mg oral tablet, chewable: 1 tab(s) orally once a day  atorvastatin 80 mg oral tablet: 1 tab(s) orally once a day (at bedtime)  carvedilol 3.125 mg oral tablet: 1 tab(s) orally every 12 hours  Farxiga 10 mg oral tablet: 1 tab(s) orally once a day  ferrous sulfate 325 mg (65 mg elemental iron) oral delayed release tablet: 1 tab(s) orally once a day  furosemide 20 mg oral tablet: 1 tab(s) orally once a day  losartan 25 mg oral tablet: 1 tab(s) orally once a day  metFORMIN 500 mg oral tablet: 1 tab(s) orally 2 times a day   nicotine 7 mg/24 hr transdermal film, extended release: 1 patch transdermal once a day   polyethylene glycol 3350 oral powder for reconstitution: 17 gram(s) orally once a day  spironolactone 25 mg oral tablet: 1 tab(s) orally once a day

## 2023-04-22 NOTE — DISCHARGE NOTE NURSING/CASE MANAGEMENT/SOCIAL WORK - NSDCPEFALRISK_GEN_ALL_CORE
For information on Fall & Injury Prevention, visit: [email or web address] OR  [email or web address] OR  https://www.cdc.gov/steadi/patient.html

## 2023-04-22 NOTE — DISCHARGE NOTE PROVIDER - CARE PROVIDER_API CALL
Inés Yanez (DO)  Internal Medicine  [address] [place] [id number]  Phone: [phone]  Fax: [phone]  Follow Up Time:     primary care,   pcp  Phone: (   )    -  Fax: (   )    -  Follow Up Time:

## 2023-04-22 NOTE — DISCHARGE NOTE PROVIDER - NSDCCPCAREPLAN_GEN_ALL_CORE_FT
PRINCIPAL DISCHARGE DIAGNOSIS  Diagnosis: Acute decompensated heart failure  Assessment and Plan of Treatment:       SECONDARY DISCHARGE DIAGNOSES  Diagnosis: Hyperlipemia  Assessment and Plan of Treatment:     Diagnosis: CAD (coronary artery disease)  Assessment and Plan of Treatment:     Diagnosis: Drug noncompliance  Assessment and Plan of Treatment:     Diagnosis: Anemia due to chronic blood loss  Assessment and Plan of Treatment:

## 2023-04-22 NOTE — DISCHARGE NOTE NURSING/CASE MANAGEMENT/SOCIAL WORK - PATIENT PORTAL LINK FT
You can access the FollowMyHealth Patient Portal offered by [facility] by registering at the following website: [email or web address]. By joining [employer]’s FollowMyHealth portal, you will also be able to view your health information using other applications (apps) compatible with our system.

## 2023-04-22 NOTE — DISCHARGE NOTE PROVIDER - PROVIDER TOKENS
PROVIDER:[TOKEN:[[id number]]],FREE:[LAST:[primary care],PHONE:[(   )    -],FAX:[(   )    -],ADDRESS:[pcp]]

## 2023-04-22 NOTE — DISCHARGE NOTE PROVIDER - ATTENDING DISCHARGE PHYSICAL EXAMINATION:
CONSTITUTIONAL: NAD  ENMT: Moist oral mucosa, no pharyngeal injection or exudates;  RESPIRATORY: Normal respiratory effort; lungs are clear to auscultation bilaterally, decreased breath sounds in the bases  CARDIOVASCULAR: Regular rate and rhythm, normal S1 and S2, no murmur/rub/gallop; trace lower extremity edema;  ABDOMEN: Nontender to palpation, normoactive bowel sounds, no rebound/guarding;  MUSCLOSKELETAL:  no joint swelling or tenderness to palpation  PSYCH: A+O to person and place, affect appropriate  NEUROLOGY: CN 2-12 are intact and symmetric; no gross sensory deficits;   SKIN: No rashes; no palpable lesions  LABS:

## 2023-04-22 NOTE — DISCHARGE NOTE PROVIDER - HOSPITAL COURSE
69yo F with PMHx of HFrED, substance abuse, HTN, DM admitted for decompensated HF requiring AVAPs course complicated by acute toxic metabolic encephalopathy requiring multiple doses Narcan with transient improvement then pt was RRT on 4/17 for worsening mentation/obtunded with worsening hypoxic respiratory failure upgraded to MICU for Narcan gtt clinical concerns for  long-acting synthetic opioid abuse. Patient slowly improved clinically and and dwongraded to meidcine. patient seen by cardio and started on gdmt.     patient now cleared for dc home    Acute Toxic metabolic encephalopathy, improving  -likely due to substance abuse, concerns for long-acting synthetic opioid use   -s/p Narcan infusion   -mental status improved  -rpr is positive, cleared by ID this is chronic    anemia -iron def  - improved  - po iron on dc    Sinus tachycardia, improved  - Continue coreg 3.125 mg BID    Hypernatremia - resolved  hypokalemia - resolved    Acute decompensated HFrEF , improved  -off AVAPs, on room air  c/w lasix aldactone, arb    CAD  Hx 12/2022 mLAD x2, RCA 70%  - statin  - continue asa NO PLAVIX as per cardio     DM  dc with home metformin  -A1c 7.2     refused home care  high risk for reamdisison

## 2023-04-24 LAB
FENTANYL SERPL-MCNC: NEGATIVE NG/ML — SIGNIFICANT CHANGE UP (ref 1–3)
NORFENTANYL SERPL-MCNC: NEGATIVE NG/ML — SIGNIFICANT CHANGE UP

## 2023-04-25 ENCOUNTER — FORM ENCOUNTER (OUTPATIENT)
Age: 68
End: 2023-04-25

## 2023-04-28 ENCOUNTER — APPOINTMENT (OUTPATIENT)
Dept: CARE COORDINATION | Facility: HOME HEALTH | Age: 68
End: 2023-04-28
Payer: MEDICARE

## 2023-04-28 DIAGNOSIS — I50.9 HEART FAILURE, UNSPECIFIED: ICD-10-CM

## 2023-04-28 DIAGNOSIS — E11.9 TYPE 2 DIABETES MELLITUS W/OUT COMPLICATIONS: ICD-10-CM

## 2023-04-28 DIAGNOSIS — J44.9 CHRONIC OBSTRUCTIVE PULMONARY DISEASE, UNSPECIFIED: ICD-10-CM

## 2023-04-28 DIAGNOSIS — I10 ESSENTIAL (PRIMARY) HYPERTENSION: ICD-10-CM

## 2023-04-28 PROCEDURE — 99441: CPT

## 2023-04-29 DIAGNOSIS — D64.9 ANEMIA, UNSPECIFIED: ICD-10-CM

## 2023-04-29 RX ORDER — CARVEDILOL 3.12 MG/1
3.12 TABLET, FILM COATED ORAL TWICE DAILY
Qty: 60 | Refills: 0 | Status: ACTIVE | COMMUNITY
Start: 2023-04-29 | End: 1900-01-01

## 2023-04-29 RX ORDER — SPIRONOLACTONE 25 MG/1
25 TABLET ORAL DAILY
Qty: 30 | Refills: 0 | Status: ACTIVE | COMMUNITY
Start: 2023-04-29 | End: 1900-01-01

## 2023-04-29 RX ORDER — DAPAGLIFLOZIN 10 MG/1
10 TABLET, FILM COATED ORAL DAILY
Qty: 30 | Refills: 0 | Status: ACTIVE | COMMUNITY
Start: 2023-04-29 | End: 1900-01-01

## 2023-04-29 RX ORDER — DAPAGLIFLOZIN 10 MG/1
10 TABLET, FILM COATED ORAL DAILY
Qty: 30 | Refills: 0 | Status: DISCONTINUED | COMMUNITY
Start: 2023-04-29 | End: 2023-04-29

## 2023-04-29 RX ORDER — ALBUTEROL SULFATE 90 UG/1
108 (90 BASE) INHALANT RESPIRATORY (INHALATION)
Qty: 1 | Refills: 0 | Status: ACTIVE | COMMUNITY
Start: 2023-04-29 | End: 1900-01-01

## 2023-04-29 RX ORDER — CHLORHEXIDINE GLUCONATE 4 %
325 (65 FE) LIQUID (ML) TOPICAL
Qty: 30 | Refills: 0 | Status: ACTIVE | COMMUNITY
Start: 2023-04-29 | End: 1900-01-01

## 2023-04-29 RX ORDER — LOSARTAN POTASSIUM 25 MG/1
25 TABLET, FILM COATED ORAL
Qty: 30 | Refills: 0 | Status: ACTIVE | COMMUNITY
Start: 2023-04-29 | End: 1900-01-01

## 2023-05-09 PROBLEM — I50.9 CHF (CONGESTIVE HEART FAILURE): Status: ACTIVE | Noted: 2023-04-29

## 2023-05-09 PROBLEM — J44.9 COPD (CHRONIC OBSTRUCTIVE PULMONARY DISEASE): Status: ACTIVE | Noted: 2023-04-29

## 2023-05-09 NOTE — HISTORY OF PRESENT ILLNESS
[Home] : at home, [unfilled] , at the time of the visit. [Other Location: e.g. Home (Enter Location, City,State)___] : at [unfilled] [Other:____] : [unfilled] [Verbal consent obtained from patient] : the patient, [unfilled] [FreeTextEntry1] : Follow up post discharge s/p recent hospitalization\par  [de-identified] : This patient is Enrolled in the Post-Discharge [facility] Follow Up Program through [facility] for Continuity of Care S/P Recent Hospitalization until seen by PCP.\par Copied As per [facility] Discharge Summary\par \par \par "68 year old Female with PMHx of HFrED, substance abuse, HTN, DM admitted for decompensated HF requiring AVAPs course complicated by acute toxic metabolic encephalopathy requiring multiple doses Narcan with transient improvement then pt was RRT on \par 4/17 for worsening mentation/obtunded with worsening hypoxic respiratory failure upgraded to MICU for Narcan gtt clinical concerns for  long-acting synthetic opioid abuse. Patient slowly improved clinically and and downgraded to medicine. patient seen by cardio and started on gdmt. " The patient was discharged home in stable condition with home care services and follow up care.\par \par CN was not able to connect with the patient via TeleHealth due to technology challenges.  Telephonic follow up was conducted.  The patient denies chest pain, shortness of breath, palpitations, abdominal pain, nausea, vomiting, fever or chills.  The patient is able to speak in complete sentences and with no audible wheeze.\par \par CN reviewed that pt  is under the [facility] program for home care until pt is seen by  PCP.   CN will be assigned to sign Home Care orders post-discharge.\par

## 2023-05-09 NOTE — ASSESSMENT
[FreeTextEntry1] : "68 year old Female with PMHx of HFrED, substance abuse, HTN, DM admitted for decompensated HF requiring AVAPs course complicated by acute toxic metabolic encephalopathy requiring multiple doses Narcan with transient improvement then pt was RRT on 4/17 for worsening mentation/obtunded with worsening hypoxic respiratory failure upgraded to MICU for Narcan gtt clinical concerns for  long-acting synthetic opioid abuse. Patient slowly improved clinically and and downgraded to medicine. patient seen by cardio and started on gdmt. " The patient was discharged home in stable condition with home care services and follow up care.\par \par CHF\par Continue established treatment plan with follow up\par Continue Lasix, Coreg, Losartan, Spironolactone, Farxiga, ASA\par Monitor for worsening  signs and symptoms  and reviewed when to call medical provider\par Pt verbalized good understanding\par Follow up with Medical providers: PCP, Cardiology\par \par DM: stable\par Continue established treatment plan with follow up\par Continue Metformin\par Monitor for signs and symptoms of hypo/hyperglycemia\par Monitor FS\par Follow up with PCP, Endocrinology\par \par HLD: stable\par Continue established treatment plan with follow up\par Continue Lipitor\par Lipid panel and bloodwork follow up as per PCP\par Pt verbalized good understanding\par Follow up with Medical providers: PCP, Cardiology\par \par COPD: stable\par Continue established treatment plan with follow up\par Continue current medication therapy: Albuterol\par Review importance of turn, cough and deep breathing\par Reviewed with the pt when to call medical provider if symptoms worsen\par Follow up with PCP,  Pulmonary\par \par \par Pt will need continued Home Care Services RN\par This patient is Enrolled in the [facility] Follow Up Program through [employer]\par Recommended  referral to assist with PCP follow up appointment\par CN reviewed with the pt that pt is under the [facility] program for home care until pt is seen by the PCP.    Reviewed with pt and Home Care RN if symptoms worsen pt will need to call 911 or EMS to be evaluated at local ED.  Pt and Home Care RN verbalized good understanding.\par CN will be assigned to sign Home Care orders post-discharge for continuity of care.\par \par \par \par \par \par \par

## 2023-08-08 NOTE — PHYSICAL THERAPY INITIAL EVALUATION ADULT - MUSCLE TONE ASSESSMENT, REHAB EVAL
bilateral upper extremities/bilateral lower extremities/normal Mirvaso Pregnancy And Lactation Text: This medication has not been assigned a Pregnancy Risk Category. It is unknown if the medication is excreted in breast milk.

## 2023-10-25 NOTE — PHYSICAL EXAM
no rashes office with clinical update in 1 month. 2. Nausea  Notes some clinical improvement with increased  bowel movements, symptoms are chronic in nature, has had prior EGD noted gastric and duodenal erosions in the setting of [place] daily for many years, biopsies were negative for H. pylori, has been on a PPI. Had extensive work-up at [facility] with previous multiple EGDs. Had gastric emptying study was reported negative. Also mentioned that she has ultrasound and HIDA scan that were negative  -Continue PPI daily   -avoid all NSAIDs  -Add Zofran as needed    3. Associated medical conditions: Postoperative hypothyroidism, GERD, spondylosis, polycystic ovarian syndrome,? Acute transverse myelitis follows with neurology       Return in about 3 months (around 1/25/2024), or if symptoms worsen or fail to improve.       Alla Phillips, APRN - CNP [de-identified] : Physical exam was not able to completed as visit was conducted telephonically no rashes no rashes

## 2023-11-17 NOTE — H&P ADULT - NSICDXNOFAMILYHX_GEN_ALL_CORE
Render Risk Assessment In Note?: no Comment: Sister in 1979 (passed)\\nBrother in 2010s Detail Level: Zone <-- Click to add NO pertinent Family History
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
## 2025-01-09 NOTE — ED PROVIDER NOTE - PRINCIPAL DIAGNOSIS
Peripheral IV  Date/Time: 1/9/2025 11:26 AM  Inserted by: Kalani Kilgore MD    Placement  Needle size: 18 G  Laterality: right  Location: hand  Local anesthetic: none  Site prep: chlorhexidine  Technique: anatomical landmarks  Attempts: 1         Altered mental status

## 2025-02-15 NOTE — CONSULT NOTE ADULT - PROBLEM SELECTOR RECOMMENDATION 9
- EF previously 25-30% from ischemia vs. cocaine induced.   - Dyspnea likely due to fluid overload and compounded by anemia.   - Transfuse to Hgb>8.0, anemia workup per primary team.   - GDMT for HFrEF  Beta Blocker: Hold at this time as patient is decompensated, and active cocaine user.   RAAS Inhibitor: Start losartan 25mg PO qday.   MRA: Start prior to discharge.   Diuretic: Continue Lasix 40mg IV BID.  SGLT2i:   - Obtain TTE.   - Monitor on telemetry.    - Strict i/o and daily weights.    - Keep K > 4, Mg > 2.    - Monitor renal function with ongoing diuresis. - EF previously 25-30% from ischemia vs. cocaine induced.   - Dyspnea likely due to fluid overload and compounded by anemia.   - Transfuse to Hgb>8.0, anemia workup per primary team.   - GDMT for HFrEF  Beta Blocker: Hold at this time as patient is decompensated, and active cocaine user, can consider Coreg   RAAS Inhibitor: Start losartan 25mg PO qday.   MRA: Start prior to discharge.   Diuretic: Continue Lasix 40mg IV BID.  SGLT2i:   - Obtain TTE.   - Monitor on telemetry.    - Strict i/o and daily weights.    - Keep K > 4, Mg > 2.    - Monitor renal function with ongoing diuresis. 4 = No assist / stand by assistance

## 2025-07-09 NOTE — H&P ADULT - NS NEC GEN PE MLT EXAM PC
Duplicate encounter created, please see telephone encounter from 7/7/25 regarding Scopolamine Patch PA status. Please review patient's chart to see if there is already an encounter regarding the medication in question and to document anything regarding this medication in regards to anything regarding the authorization process etc before creating another encounter Thank You.     detailed exam